# Patient Record
Sex: FEMALE | Race: WHITE | Employment: OTHER | URBAN - METROPOLITAN AREA
[De-identification: names, ages, dates, MRNs, and addresses within clinical notes are randomized per-mention and may not be internally consistent; named-entity substitution may affect disease eponyms.]

---

## 2018-02-06 ENCOUNTER — HOSPITAL ENCOUNTER (INPATIENT)
Age: 78
LOS: 3 days | Discharge: NURSING, REHAB OR LONG-TERM CARE | DRG: 066 | End: 2018-02-09
Attending: EMERGENCY MEDICINE | Admitting: INTERNAL MEDICINE
Payer: MEDICARE

## 2018-02-06 ENCOUNTER — APPOINTMENT (OUTPATIENT)
Dept: MRI IMAGING | Age: 78
DRG: 066 | End: 2018-02-06
Payer: MEDICARE

## 2018-02-06 ENCOUNTER — APPOINTMENT (OUTPATIENT)
Dept: CT IMAGING | Age: 78
DRG: 066 | End: 2018-02-06
Payer: MEDICARE

## 2018-02-06 ENCOUNTER — APPOINTMENT (OUTPATIENT)
Dept: GENERAL RADIOLOGY | Age: 78
End: 2018-02-06
Payer: MEDICARE

## 2018-02-06 ENCOUNTER — APPOINTMENT (OUTPATIENT)
Dept: CT IMAGING | Age: 78
End: 2018-02-06
Payer: MEDICARE

## 2018-02-06 ENCOUNTER — HOSPITAL ENCOUNTER (EMERGENCY)
Age: 78
Discharge: ANOTHER HOSPITAL | End: 2018-02-06
Attending: EMERGENCY MEDICINE
Payer: MEDICARE

## 2018-02-06 VITALS
HEART RATE: 85 BPM | DIASTOLIC BLOOD PRESSURE: 87 MMHG | TEMPERATURE: 100.4 F | OXYGEN SATURATION: 95 % | SYSTOLIC BLOOD PRESSURE: 186 MMHG | RESPIRATION RATE: 24 BRPM | WEIGHT: 155 LBS

## 2018-02-06 DIAGNOSIS — I16.0 HYPERTENSIVE URGENCY: ICD-10-CM

## 2018-02-06 DIAGNOSIS — R29.898 LUE WEAKNESS: Primary | ICD-10-CM

## 2018-02-06 DIAGNOSIS — I63.00 CEREBROVASCULAR ACCIDENT (CVA) DUE TO THROMBOSIS OF PRECEREBRAL ARTERY (HCC): Primary | ICD-10-CM

## 2018-02-06 PROBLEM — I10 ESSENTIAL HYPERTENSION: Status: ACTIVE | Noted: 2018-02-06

## 2018-02-06 PROBLEM — I66.01 STENOSIS OF RIGHT MIDDLE CEREBRAL ARTERY: Status: ACTIVE | Noted: 2018-02-06

## 2018-02-06 LAB
-: ABNORMAL
-: NORMAL
ABSOLUTE EOS #: 0.1 K/UL (ref 0–0.4)
ABSOLUTE IMMATURE GRANULOCYTE: ABNORMAL K/UL (ref 0–0.3)
ABSOLUTE LYMPH #: 1.3 K/UL (ref 1–4.8)
ABSOLUTE MONO #: 0.5 K/UL (ref 0–1)
ALBUMIN SERPL-MCNC: 3.9 G/DL (ref 3.5–5.2)
ALBUMIN/GLOBULIN RATIO: 1 (ref 1–2.5)
ALP BLD-CCNC: 86 U/L (ref 35–104)
ALT SERPL-CCNC: 15 U/L (ref 5–33)
AMORPHOUS: ABNORMAL
AMPHETAMINE SCREEN URINE: NEGATIVE
ANION GAP SERPL CALCULATED.3IONS-SCNC: 12 MMOL/L (ref 9–17)
AST SERPL-CCNC: 21 U/L
BACTERIA: ABNORMAL
BARBITURATE SCREEN URINE: NEGATIVE
BASOPHILS # BLD: 0 % (ref 0–2)
BASOPHILS ABSOLUTE: 0 K/UL (ref 0–0.2)
BENZODIAZEPINE SCREEN, URINE: NEGATIVE
BILIRUB SERPL-MCNC: 0.47 MG/DL (ref 0.3–1.2)
BILIRUBIN URINE: NEGATIVE
BUN BLDV-MCNC: 9 MG/DL (ref 8–23)
BUN/CREAT BLD: 11 (ref 9–20)
BUPRENORPHINE URINE: NEGATIVE
CALCIUM SERPL-MCNC: 9.4 MG/DL (ref 8.6–10.4)
CANNABINOID SCREEN URINE: NEGATIVE
CASTS UA: ABNORMAL /LPF
CHLORIDE BLD-SCNC: 99 MMOL/L (ref 98–107)
CO2: 26 MMOL/L (ref 20–31)
COCAINE METABOLITE, URINE: NEGATIVE
COLOR: YELLOW
COMMENT UA: ABNORMAL
CREAT SERPL-MCNC: 0.81 MG/DL (ref 0.5–0.9)
CRYSTALS, UA: ABNORMAL /HPF
DIFFERENTIAL TYPE: ABNORMAL
EKG ATRIAL RATE: 102 BPM
EKG P AXIS: 52 DEGREES
EKG P-R INTERVAL: 172 MS
EKG Q-T INTERVAL: 378 MS
EKG QRS DURATION: 84 MS
EKG QTC CALCULATION (BAZETT): 492 MS
EKG R AXIS: -16 DEGREES
EKG T AXIS: 29 DEGREES
EKG VENTRICULAR RATE: 102 BPM
EOSINOPHILS RELATIVE PERCENT: 1 % (ref 0–8)
EPITHELIAL CELLS UA: ABNORMAL /HPF (ref 0–25)
GFR AFRICAN AMERICAN: >60 ML/MIN
GFR NON-AFRICAN AMERICAN: >60 ML/MIN
GFR SERPL CREATININE-BSD FRML MDRD: ABNORMAL ML/MIN/{1.73_M2}
GFR SERPL CREATININE-BSD FRML MDRD: ABNORMAL ML/MIN/{1.73_M2}
GLUCOSE BLD-MCNC: 158 MG/DL
GLUCOSE BLD-MCNC: 158 MG/DL (ref 70–99)
GLUCOSE BLD-MCNC: 158 MG/DL (ref 74–100)
GLUCOSE URINE: NEGATIVE
HCT VFR BLD CALC: 43.8 % (ref 36–46)
HEMOGLOBIN: 14.5 G/DL (ref 12–16)
IMMATURE GRANULOCYTES: ABNORMAL %
INR BLD: 1 (ref 0.9–1.2)
KETONES, URINE: NEGATIVE
LEUKOCYTE ESTERASE, URINE: ABNORMAL
LYMPHOCYTES # BLD: 13 % (ref 24–44)
MCH RBC QN AUTO: 28.4 PG (ref 26–34)
MCHC RBC AUTO-ENTMCNC: 33.2 G/DL (ref 31–37)
MCV RBC AUTO: 85.4 FL (ref 80–100)
MDMA URINE: NORMAL
METHADONE SCREEN, URINE: NEGATIVE
METHAMPHETAMINE, URINE: NEGATIVE
MONOCYTES # BLD: 5 % (ref 0–12)
MUCUS: ABNORMAL
NITRITE, URINE: NEGATIVE
NRBC AUTOMATED: ABNORMAL PER 100 WBC
OPIATES, URINE: NEGATIVE
OTHER OBSERVATIONS UA: ABNORMAL
OXYCODONE SCREEN URINE: NEGATIVE
PARTIAL THROMBOPLASTIN TIME: 26 SEC (ref 23.2–34.4)
PDW BLD-RTO: 13.7 % (ref 12.1–15.2)
PH UA: 7 (ref 5–9)
PHENCYCLIDINE, URINE: NEGATIVE
PLATELET # BLD: 221 K/UL (ref 140–450)
PLATELET ESTIMATE: ABNORMAL
PMV BLD AUTO: 8.8 FL (ref 6–12)
POTASSIUM SERPL-SCNC: 3.6 MMOL/L (ref 3.7–5.3)
PROPOXYPHENE, URINE: NEGATIVE
PROTEIN UA: ABNORMAL
PROTHROMBIN TIME: 10.2 SEC (ref 9.7–12.2)
RBC # BLD: 5.13 M/UL (ref 4–5.2)
RBC # BLD: ABNORMAL 10*6/UL
RBC UA: ABNORMAL /HPF (ref 0–2)
REASON FOR REJECTION: NORMAL
RENAL EPITHELIAL, UA: ABNORMAL /HPF
SEG NEUTROPHILS: 81 % (ref 36–66)
SEGMENTED NEUTROPHILS ABSOLUTE COUNT: 8.2 K/UL (ref 1.8–7.7)
SODIUM BLD-SCNC: 137 MMOL/L (ref 135–144)
SPECIFIC GRAVITY UA: 1.01 (ref 1.01–1.02)
TEST INFORMATION: NORMAL
TOTAL PROTEIN: 7.9 G/DL (ref 6.4–8.3)
TRICHOMONAS: ABNORMAL
TRICYCLIC ANTIDEPRESSANTS, UR: NEGATIVE
TURBIDITY: CLEAR
URINE HGB: NEGATIVE
UROBILINOGEN, URINE: NORMAL
WBC # BLD: 10.2 K/UL (ref 3.5–11)
WBC # BLD: ABNORMAL 10*3/UL
WBC UA: ABNORMAL /HPF (ref 0–5)
YEAST: ABNORMAL
ZZ NTE CLEAN UP: ORDERED TEST: NORMAL
ZZ NTE WITH NAME CLEAN UP: SPECIMEN SOURCE: NORMAL

## 2018-02-06 PROCEDURE — 2500000003 HC RX 250 WO HCPCS: Performed by: EMERGENCY MEDICINE

## 2018-02-06 PROCEDURE — 71045 X-RAY EXAM CHEST 1 VIEW: CPT

## 2018-02-06 PROCEDURE — 85730 THROMBOPLASTIN TIME PARTIAL: CPT

## 2018-02-06 PROCEDURE — 99223 1ST HOSP IP/OBS HIGH 75: CPT | Performed by: PSYCHIATRY & NEUROLOGY

## 2018-02-06 PROCEDURE — 70450 CT HEAD/BRAIN W/O DYE: CPT

## 2018-02-06 PROCEDURE — 96367 TX/PROPH/DG ADDL SEQ IV INF: CPT

## 2018-02-06 PROCEDURE — 80306 DRUG TEST PRSMV INSTRMNT: CPT

## 2018-02-06 PROCEDURE — 70496 CT ANGIOGRAPHY HEAD: CPT

## 2018-02-06 PROCEDURE — 6370000000 HC RX 637 (ALT 250 FOR IP): Performed by: EMERGENCY MEDICINE

## 2018-02-06 PROCEDURE — 6370000000 HC RX 637 (ALT 250 FOR IP): Performed by: STUDENT IN AN ORGANIZED HEALTH CARE EDUCATION/TRAINING PROGRAM

## 2018-02-06 PROCEDURE — 6360000002 HC RX W HCPCS: Performed by: EMERGENCY MEDICINE

## 2018-02-06 PROCEDURE — 70498 CT ANGIOGRAPHY NECK: CPT

## 2018-02-06 PROCEDURE — 96365 THER/PROPH/DIAG IV INF INIT: CPT

## 2018-02-06 PROCEDURE — 93005 ELECTROCARDIOGRAM TRACING: CPT

## 2018-02-06 PROCEDURE — 85610 PROTHROMBIN TIME: CPT

## 2018-02-06 PROCEDURE — 2580000003 HC RX 258: Performed by: STUDENT IN AN ORGANIZED HEALTH CARE EDUCATION/TRAINING PROGRAM

## 2018-02-06 PROCEDURE — 81001 URINALYSIS AUTO W/SCOPE: CPT

## 2018-02-06 PROCEDURE — 36415 COLL VENOUS BLD VENIPUNCTURE: CPT

## 2018-02-06 PROCEDURE — 85025 COMPLETE CBC W/AUTO DIFF WBC: CPT

## 2018-02-06 PROCEDURE — 99285 EMERGENCY DEPT VISIT HI MDM: CPT

## 2018-02-06 PROCEDURE — 70551 MRI BRAIN STEM W/O DYE: CPT

## 2018-02-06 PROCEDURE — 82947 ASSAY GLUCOSE BLOOD QUANT: CPT

## 2018-02-06 PROCEDURE — 2060000000 HC ICU INTERMEDIATE R&B

## 2018-02-06 PROCEDURE — 96375 TX/PRO/DX INJ NEW DRUG ADDON: CPT

## 2018-02-06 PROCEDURE — 6360000004 HC RX CONTRAST MEDICATION: Performed by: EMERGENCY MEDICINE

## 2018-02-06 PROCEDURE — 80053 COMPREHEN METABOLIC PANEL: CPT

## 2018-02-06 RX ORDER — SODIUM CHLORIDE 0.9 % (FLUSH) 0.9 %
10 SYRINGE (ML) INJECTION EVERY 12 HOURS SCHEDULED
Status: DISCONTINUED | OUTPATIENT
Start: 2018-02-06 | End: 2018-02-09 | Stop reason: HOSPADM

## 2018-02-06 RX ORDER — ACETAMINOPHEN 325 MG/1
650 TABLET ORAL EVERY 4 HOURS PRN
Status: CANCELLED | OUTPATIENT
Start: 2018-02-06

## 2018-02-06 RX ORDER — ATORVASTATIN CALCIUM 40 MG/1
40 TABLET, FILM COATED ORAL NIGHTLY
Status: DISCONTINUED | OUTPATIENT
Start: 2018-02-06 | End: 2018-02-09 | Stop reason: HOSPADM

## 2018-02-06 RX ORDER — ONDANSETRON 2 MG/ML
4 INJECTION INTRAMUSCULAR; INTRAVENOUS EVERY 6 HOURS PRN
Status: DISCONTINUED | OUTPATIENT
Start: 2018-02-06 | End: 2018-02-09 | Stop reason: HOSPADM

## 2018-02-06 RX ORDER — ASPIRIN 81 MG/1
81 TABLET ORAL DAILY
Status: DISCONTINUED | OUTPATIENT
Start: 2018-02-07 | End: 2018-02-07

## 2018-02-06 RX ORDER — LABETALOL HYDROCHLORIDE 5 MG/ML
10 INJECTION, SOLUTION INTRAVENOUS EVERY 10 MIN PRN
Status: DISCONTINUED | OUTPATIENT
Start: 2018-02-06 | End: 2018-02-09 | Stop reason: HOSPADM

## 2018-02-06 RX ORDER — CLOPIDOGREL BISULFATE 75 MG/1
75 TABLET ORAL DAILY
Status: DISCONTINUED | OUTPATIENT
Start: 2018-02-07 | End: 2018-02-08

## 2018-02-06 RX ORDER — ACETAMINOPHEN 325 MG/1
650 TABLET ORAL EVERY 4 HOURS PRN
Status: DISCONTINUED | OUTPATIENT
Start: 2018-02-06 | End: 2018-02-09 | Stop reason: HOSPADM

## 2018-02-06 RX ORDER — SODIUM CHLORIDE 0.9 % (FLUSH) 0.9 %
10 SYRINGE (ML) INJECTION EVERY 12 HOURS SCHEDULED
Status: CANCELLED | OUTPATIENT
Start: 2018-02-06

## 2018-02-06 RX ORDER — LABETALOL HYDROCHLORIDE 5 MG/ML
20 INJECTION, SOLUTION INTRAVENOUS ONCE
Status: COMPLETED | OUTPATIENT
Start: 2018-02-06 | End: 2018-02-06

## 2018-02-06 RX ORDER — SODIUM CHLORIDE 0.9 % (FLUSH) 0.9 %
10 SYRINGE (ML) INJECTION PRN
Status: DISCONTINUED | OUTPATIENT
Start: 2018-02-06 | End: 2018-02-09 | Stop reason: HOSPADM

## 2018-02-06 RX ORDER — LABETALOL HYDROCHLORIDE 5 MG/ML
10 INJECTION, SOLUTION INTRAVENOUS
Status: DISPENSED | OUTPATIENT
Start: 2018-02-06 | End: 2018-02-06

## 2018-02-06 RX ORDER — SODIUM CHLORIDE 0.9 % (FLUSH) 0.9 %
10 SYRINGE (ML) INJECTION PRN
Status: CANCELLED | OUTPATIENT
Start: 2018-02-06

## 2018-02-06 RX ORDER — LEVETIRACETAM 5 MG/ML
500 INJECTION INTRAVASCULAR ONCE
Status: COMPLETED | OUTPATIENT
Start: 2018-02-06 | End: 2018-02-06

## 2018-02-06 RX ORDER — CLOPIDOGREL BISULFATE 75 MG/1
300 TABLET ORAL ONCE
Status: COMPLETED | OUTPATIENT
Start: 2018-02-06 | End: 2018-02-06

## 2018-02-06 RX ORDER — ASPIRIN 81 MG/1
324 TABLET, CHEWABLE ORAL ONCE
Status: COMPLETED | OUTPATIENT
Start: 2018-02-06 | End: 2018-02-06

## 2018-02-06 RX ORDER — ACETAMINOPHEN 325 MG/1
650 TABLET ORAL ONCE
Status: COMPLETED | OUTPATIENT
Start: 2018-02-06 | End: 2018-02-06

## 2018-02-06 RX ADMIN — CLOPIDOGREL BISULFATE 300 MG: 75 TABLET ORAL at 14:49

## 2018-02-06 RX ADMIN — ACETAMINOPHEN 650 MG: 325 TABLET ORAL at 19:38

## 2018-02-06 RX ADMIN — IOPAMIDOL 90 ML: 755 INJECTION, SOLUTION INTRAVENOUS at 17:49

## 2018-02-06 RX ADMIN — NICARDIPINE HYDROCHLORIDE 5 MG/HR: 0.2 INJECTION, SOLUTION INTRAVENOUS at 13:32

## 2018-02-06 RX ADMIN — Medication 10 ML: at 23:30

## 2018-02-06 RX ADMIN — LEVETIRACETAM 500 MG: 5 INJECTION INTRAVENOUS at 12:53

## 2018-02-06 RX ADMIN — DESMOPRESSIN ACETATE 40 MG: 0.2 TABLET ORAL at 23:30

## 2018-02-06 RX ADMIN — LABETALOL HYDROCHLORIDE 20 MG: 5 INJECTION, SOLUTION INTRAVENOUS at 12:31

## 2018-02-06 RX ADMIN — ASPIRIN 81 MG 324 MG: 81 TABLET ORAL at 14:48

## 2018-02-06 ASSESSMENT — ENCOUNTER SYMPTOMS
ABDOMINAL PAIN: 0
ABDOMINAL DISTENTION: 0
NAUSEA: 0
CONSTIPATION: 0
COLOR CHANGE: 0
SHORTNESS OF BREATH: 0
COUGH: 0
WHEEZING: 0
DIARRHEA: 0

## 2018-02-06 ASSESSMENT — PAIN SCALES - GENERAL: PAINLEVEL_OUTOF10: 6

## 2018-02-06 NOTE — ED NOTES
Pt placed on cardiac monitor, bp cuff and pulse ox. Dr. Tonia Hicks at bedside to evaluate.      Arthur Hodgesident, RN  02/06/18 [id number]

## 2018-02-06 NOTE — ED PROVIDER NOTES
File       REVIEW OF SYSTEMS    (2-9 systems for level 4, 10 or more for level 5)      Review of Systems   Constitutional: Negative for chills and fever. HENT: Negative for congestion, rhinorrhea, sinus pressure and sore throat. Eyes: Negative for photophobia and visual disturbance. Respiratory: Negative for cough and shortness of breath. Cardiovascular: Negative for chest pain. Gastrointestinal: Negative for abdominal pain, blood in stool, constipation, diarrhea, nausea and vomiting. Genitourinary: Negative for dysuria and hematuria. Musculoskeletal: Negative for back pain and neck pain. Skin: Negative for rash. Neurological: Positive for weakness. Negative for dizziness, light-headedness, numbness and headaches. Psychiatric/Behavioral: Negative for suicidal ideas. PHYSICAL EXAM   (up to 7 for level 4, 8 or more for level 5)      INITIAL VITALS:   BP (!) 176/67   Pulse 73   Temp 97.8 °F (36.6 °C) (Oral)   Resp 16   Ht 5' 5\" (1.651 m)   Wt 155 lb 3.3 oz (70.4 kg)   SpO2 95%   BMI 25.83 kg/m²     Physical Exam   Constitutional: She is oriented to person, place, and time. She appears well-developed and well-nourished. HENT:   Head: Normocephalic and atraumatic. Right Ear: Tympanic membrane normal.   Left Ear: Tympanic membrane normal.   Eyes: EOM are normal. Pupils are equal, round, and reactive to light. No scleral icterus. Neck: Normal range of motion. Neck supple. No JVD present. Cardiovascular: Normal rate, regular rhythm, normal heart sounds and intact distal pulses. Exam reveals no gallop and no friction rub. No murmur heard. Pulmonary/Chest: Effort normal and breath sounds normal. No respiratory distress. She has no wheezes. She has no rales. Abdominal: Soft. Bowel sounds are normal. She exhibits no distension and no mass. There is no tenderness. There is no rebound and no guarding. Musculoskeletal: Normal range of motion. She exhibits no edema. reconstruction, and/or weight based adjustment of the mA/kV was utilized to reduce the radiation dose to as low as reasonably achievable.; CTA of the neck was performed with the administration of intravenous contrast. Multiplanar reformatted images are provided for review. MIP images are provided for review. Stenosis of the internal carotid arteries measured using NASCET criteria. Dose modulation, iterative reconstruction, and/or weight based adjustment of the mA/kV was utilized to reduce the radiation dose to as low as reasonably achievable. COMPARISON: None. CT brain performed at [address] [place] [address] HISTORY: LUE weakness Initial presentation. FINDINGS: CTA NECK: AORTIC ARCH/ARCH VESSELS: Mild atherosclerotic calcifications are noted at the origin of the innominate artery. There is no significant stenosis of the origins of the great vessels. There is no significant stenosis of either the right or left subclavian artery. CAROTID ARTERIES: There is no significant stenosis of either the right or left common carotid artery. Mild calcified atherosclerotic plaque is present at the origins of both internal carotid arteries. There is however no significant stenosis of either the right or left internal carotid artery based on NASCET criteria. VERTEBRAL ARTERIES: The vertebral arteries are normal in appearance. There is no significant stenosis or dissection. SOFT TISSUES: The lung apices are clear. There is no pathologically enlarged lymphadenopathy identified. Complete opacification of the left maxillary sinus is present with a thickened and sclerotic appearance of the walls of the left maxillary sinus suggesting chronic left maxillary sinusitis. BONES: No lytic or blastic osseous lesions are identified. CTA HEAD: ANTERIOR CIRCULATION: Calcified atherosclerotic plaque is present within the cavernous segments of the internal carotid arteries.   There is no significant stenosis or the radiation dose to as low as reasonably achievable. COMPARISON: None. CT brain performed at [address] [place] [address] HISTORY: LUE weakness Initial presentation. FINDINGS: CTA NECK: AORTIC ARCH/ARCH VESSELS: Mild atherosclerotic calcifications are noted at the origin of the innominate artery. There is no significant stenosis of the origins of the great vessels. There is no significant stenosis of either the right or left subclavian artery. CAROTID ARTERIES: There is no significant stenosis of either the right or left common carotid artery. Mild calcified atherosclerotic plaque is present at the origins of both internal carotid arteries. There is however no significant stenosis of either the right or left internal carotid artery based on NASCET criteria. VERTEBRAL ARTERIES: The vertebral arteries are normal in appearance. There is no significant stenosis or dissection. SOFT TISSUES: The lung apices are clear. There is no pathologically enlarged lymphadenopathy identified. Complete opacification of the left maxillary sinus is present with a thickened and sclerotic appearance of the walls of the left maxillary sinus suggesting chronic left maxillary sinusitis. BONES: No lytic or blastic osseous lesions are identified. CTA HEAD: ANTERIOR CIRCULATION: Calcified atherosclerotic plaque is present within the cavernous segments of the internal carotid arteries. There is no significant stenosis or aneurysm identified within the intracranial segments of the internal carotid arteries. There is however no significant stenosis of the intracranial segments of the internal carotid arteries. There is a moderate to severe short segment stenosis of the mid portion of the M1 segment of the right middle cerebral artery. The middle cerebral arteries are otherwise normal in appearance without significant stenosis or cerebral aneurysm. The anterior cerebral arteries are normal in appearance.

## 2018-02-06 NOTE — ED TRIAGE NOTES
Pt transferred from [place] for possible stroke. Pt arrives with L sided weakness since yesterday. Pt arrives alert and oriented.

## 2018-02-06 NOTE — ED PROVIDER NOTES
[facility]  Emergency Department  Faculty Attestation     I performed a history and physical examination of the patient and discussed management with the resident. I reviewed the residents note and agree with the documented findings and plan of care. Any areas of disagreement are noted on the chart. I was personally present for the key portions of any procedures. I have documented in the chart those procedures where I was not present during the key portions. I have reviewed the emergency nurses triage note. I agree with the chief complaint, past medical history, past surgical history, allergies, medications, social and family history as documented unless otherwise noted below. For Physician Assistant/ Nurse Practitioner cases/documentation I have personally evaluated this patient and have completed at least one if not all key elements of the E/M (history, physical exam, and MDM). Additional findings are as noted. Primary Care Physician:  Teetee Parham MD    Screenings:  [unfilled]    CHIEF COMPLAINT       Chief Complaint   Patient presents with    Extremity Weakness       RECENT VITALS:    ,   ,  ,      LABS:  Labs Reviewed - No data to display    Radiology  No orders to display       CRITICAL CARE: There was a high probability of clinically significant/life threatening deterioration in this patient's condition which required my urgent intervention. Total critical care time was 5 minutes. This excludes any time for separately reportable procedures. Attending Physician Additional  Notes    Left side weakness and facial droop since late last night, transferred from outside hospital. CT negative for blood. Given labetolol, cardene gtt, keppra, aspirin, plavix. Headache resolved. On exam, GCS 15, no distress. Normal orientation, mentation, speech, pupils, EOM. No visible facial droop. Dense LUE paresis. LLE intact.  Plan stroke alert, monitor BP, CTA, MRI, admission. Zachery Kilgore.  Nicola Omer MD, [facility]  Attending Emergency  Physician                Melany Colón MD  02/06/18 0868

## 2018-02-06 NOTE — ED NOTES
Pt report from Teresa Stapleton pt resting on cot respirations even and unlabored, weakness noted to left arm will continue to monitor     Juan Lawton RN  02/06/18 2011

## 2018-02-07 LAB
CHOLESTEROL/HDL RATIO: 4.1
CHOLESTEROL: 219 MG/DL
ESTIMATED AVERAGE GLUCOSE: 123 MG/DL
HBA1C MFR BLD: 5.9 % (ref 4–6)
HDLC SERPL-MCNC: 54 MG/DL
LDL CHOLESTEROL: 138 MG/DL (ref 0–130)
LV EF: 65 %
LVEF MODALITY: NORMAL
TRIGL SERPL-MCNC: 133 MG/DL
VLDLC SERPL CALC-MCNC: ABNORMAL MG/DL (ref 1–30)

## 2018-02-07 PROCEDURE — 6360000002 HC RX W HCPCS: Performed by: STUDENT IN AN ORGANIZED HEALTH CARE EDUCATION/TRAINING PROGRAM

## 2018-02-07 PROCEDURE — 6370000000 HC RX 637 (ALT 250 FOR IP): Performed by: STUDENT IN AN ORGANIZED HEALTH CARE EDUCATION/TRAINING PROGRAM

## 2018-02-07 PROCEDURE — 83036 HEMOGLOBIN GLYCOSYLATED A1C: CPT

## 2018-02-07 PROCEDURE — 99222 1ST HOSP IP/OBS MODERATE 55: CPT | Performed by: PSYCHIATRY & NEUROLOGY

## 2018-02-07 PROCEDURE — 36415 COLL VENOUS BLD VENIPUNCTURE: CPT

## 2018-02-07 PROCEDURE — 93306 TTE W/DOPPLER COMPLETE: CPT

## 2018-02-07 PROCEDURE — 80061 LIPID PANEL: CPT

## 2018-02-07 PROCEDURE — 2580000003 HC RX 258: Performed by: STUDENT IN AN ORGANIZED HEALTH CARE EDUCATION/TRAINING PROGRAM

## 2018-02-07 PROCEDURE — 94762 N-INVAS EAR/PLS OXIMTRY CONT: CPT

## 2018-02-07 PROCEDURE — 99223 1ST HOSP IP/OBS HIGH 75: CPT | Performed by: INTERNAL MEDICINE

## 2018-02-07 PROCEDURE — 2060000000 HC ICU INTERMEDIATE R&B

## 2018-02-07 RX ADMIN — DESMOPRESSIN ACETATE 40 MG: 0.2 TABLET ORAL at 20:12

## 2018-02-07 RX ADMIN — ACETAMINOPHEN 650 MG: 325 TABLET ORAL at 13:20

## 2018-02-07 RX ADMIN — Medication 10 ML: at 09:06

## 2018-02-07 RX ADMIN — ASPIRIN 81 MG: 81 TABLET, COATED ORAL at 09:05

## 2018-02-07 RX ADMIN — ENOXAPARIN SODIUM 40 MG: 40 INJECTION SUBCUTANEOUS at 09:08

## 2018-02-07 RX ADMIN — CLOPIDOGREL 75 MG: 75 TABLET, FILM COATED ORAL at 09:05

## 2018-02-07 RX ADMIN — ACETAMINOPHEN 650 MG: 325 TABLET ORAL at 22:05

## 2018-02-07 ASSESSMENT — ENCOUNTER SYMPTOMS
BLOOD IN STOOL: 0
BACK PAIN: 0
SINUS PRESSURE: 0
VOMITING: 0
DIARRHEA: 0
SHORTNESS OF BREATH: 0
ABDOMINAL PAIN: 0
RHINORRHEA: 0
PHOTOPHOBIA: 0
NAUSEA: 0
COUGH: 0
CONSTIPATION: 0
SORE THROAT: 0

## 2018-02-07 ASSESSMENT — PAIN SCALES - GENERAL
PAINLEVEL_OUTOF10: 1
PAINLEVEL_OUTOF10: 7
PAINLEVEL_OUTOF10: 0

## 2018-02-07 NOTE — CONSULTS
Recommendations:  1. Intracranial stenosis - aspirin 325mg, clopidogrel 75mg daily. Load 300mg daily. lipitor 40mg, ldl<70  2. Pt/ot, s/s  3. 2d echo  4. followup with neuroendovascular clinic  5. No intervention at this time as not medical optimized    Discussed with Ed and neuro team along with bedside nurse.   80 min and greater than 50% time spent evaluating patient, personally reviewing images and speaking with team    Naima Beltran MD Pager: [phone]  Stroke, [facility]  [address] [place]  Electronically signed 2/6/2018 at 11:29 PM

## 2018-02-07 NOTE — CONSULTS
Moderate to severe stenosis of midportion of M1 segment of right MCA. Otherwise no significant stenosis, major branch occlusion or cerebral aneurysm identified. No significant arterial stenosis identified within the neck. MRI brain 9/7/03: Multiple embolic infarcts within posterior one half of the right MCA territory involving cortex and subcortical and deep white matter of right frontal, temporal and parietal lobes. No acute intracranial hemorrhage. CT head 2/6/18: No acute intracranial process. Mild chronic white matter changes and atrophy. EKG (2/6/18): sinus tachycardia. Lipid Panel (2/7/18): , HDL 54, , Cholesterol 219              Impression and Plan: Ms. Jia Cabrera is a 69 y.o. 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 Echocardiogram today and proceed accordingly. Passed bedside swallow and presently on regular diet. Discussed with patient and her  at bedside and Nurse. Will follow with you. Thank you for consultation.

## 2018-02-08 LAB
ANION GAP SERPL CALCULATED.3IONS-SCNC: 16 MMOL/L (ref 9–17)
BUN BLDV-MCNC: 12 MG/DL (ref 8–23)
BUN/CREAT BLD: ABNORMAL (ref 9–20)
CALCIUM SERPL-MCNC: 9.1 MG/DL (ref 8.6–10.4)
CHLORIDE BLD-SCNC: 102 MMOL/L (ref 98–107)
CO2: 21 MMOL/L (ref 20–31)
CREAT SERPL-MCNC: 0.89 MG/DL (ref 0.5–0.9)
GFR AFRICAN AMERICAN: >60 ML/MIN
GFR NON-AFRICAN AMERICAN: >60 ML/MIN
GFR SERPL CREATININE-BSD FRML MDRD: ABNORMAL ML/MIN/{1.73_M2}
GFR SERPL CREATININE-BSD FRML MDRD: ABNORMAL ML/MIN/{1.73_M2}
GLUCOSE BLD-MCNC: 171 MG/DL (ref 70–99)
HCT VFR BLD CALC: 46.7 % (ref 36.3–47.1)
HEMOGLOBIN: 14.2 G/DL (ref 11.9–15.1)
MCH RBC QN AUTO: 27.9 PG (ref 25.2–33.5)
MCHC RBC AUTO-ENTMCNC: 30.4 G/DL (ref 28.4–34.8)
MCV RBC AUTO: 91.7 FL (ref 82.6–102.9)
NRBC AUTOMATED: 0 PER 100 WBC
PDW BLD-RTO: 12.8 % (ref 11.8–14.4)
PLATELET # BLD: 218 K/UL (ref 138–453)
PMV BLD AUTO: 10.4 FL (ref 8.1–13.5)
POTASSIUM SERPL-SCNC: 3.8 MMOL/L (ref 3.7–5.3)
RBC # BLD: 5.09 M/UL (ref 3.95–5.11)
SODIUM BLD-SCNC: 139 MMOL/L (ref 135–144)
WBC # BLD: 8.7 K/UL (ref 3.5–11.3)

## 2018-02-08 PROCEDURE — G8978 MOBILITY CURRENT STATUS: HCPCS

## 2018-02-08 PROCEDURE — 80048 BASIC METABOLIC PNL TOTAL CA: CPT

## 2018-02-08 PROCEDURE — 97116 GAIT TRAINING THERAPY: CPT

## 2018-02-08 PROCEDURE — 6370000000 HC RX 637 (ALT 250 FOR IP): Performed by: STUDENT IN AN ORGANIZED HEALTH CARE EDUCATION/TRAINING PROGRAM

## 2018-02-08 PROCEDURE — 6370000000 HC RX 637 (ALT 250 FOR IP): Performed by: NURSE PRACTITIONER

## 2018-02-08 PROCEDURE — 99221 1ST HOSP IP/OBS SF/LOW 40: CPT | Performed by: PHYSICAL MEDICINE & REHABILITATION

## 2018-02-08 PROCEDURE — 99233 SBSQ HOSP IP/OBS HIGH 50: CPT | Performed by: INTERNAL MEDICINE

## 2018-02-08 PROCEDURE — 97535 SELF CARE MNGMENT TRAINING: CPT

## 2018-02-08 PROCEDURE — 94762 N-INVAS EAR/PLS OXIMTRY CONT: CPT

## 2018-02-08 PROCEDURE — G8987 SELF CARE CURRENT STATUS: HCPCS

## 2018-02-08 PROCEDURE — 85027 COMPLETE CBC AUTOMATED: CPT

## 2018-02-08 PROCEDURE — 2060000000 HC ICU INTERMEDIATE R&B

## 2018-02-08 PROCEDURE — 97162 PT EVAL MOD COMPLEX 30 MIN: CPT

## 2018-02-08 PROCEDURE — 2580000003 HC RX 258: Performed by: STUDENT IN AN ORGANIZED HEALTH CARE EDUCATION/TRAINING PROGRAM

## 2018-02-08 PROCEDURE — 6360000002 HC RX W HCPCS: Performed by: STUDENT IN AN ORGANIZED HEALTH CARE EDUCATION/TRAINING PROGRAM

## 2018-02-08 PROCEDURE — 99232 SBSQ HOSP IP/OBS MODERATE 35: CPT | Performed by: PSYCHIATRY & NEUROLOGY

## 2018-02-08 PROCEDURE — 36415 COLL VENOUS BLD VENIPUNCTURE: CPT

## 2018-02-08 PROCEDURE — G8979 MOBILITY GOAL STATUS: HCPCS

## 2018-02-08 PROCEDURE — 97167 OT EVAL HIGH COMPLEX 60 MIN: CPT

## 2018-02-08 PROCEDURE — 99233 SBSQ HOSP IP/OBS HIGH 50: CPT | Performed by: PSYCHIATRY & NEUROLOGY

## 2018-02-08 PROCEDURE — G8988 SELF CARE GOAL STATUS: HCPCS

## 2018-02-08 RX ORDER — ASPIRIN 81 MG/1
81 TABLET ORAL DAILY
Qty: 30 TABLET | Refills: 3 | Status: SHIPPED | OUTPATIENT
Start: 2018-02-09 | End: 2020-11-19 | Stop reason: ALTCHOICE

## 2018-02-08 RX ORDER — CLOPIDOGREL BISULFATE 75 MG/1
75 TABLET ORAL DAILY
Status: DISCONTINUED | OUTPATIENT
Start: 2018-02-08 | End: 2018-02-09 | Stop reason: HOSPADM

## 2018-02-08 RX ORDER — ASPIRIN 81 MG/1
81 TABLET ORAL DAILY
Status: DISCONTINUED | OUTPATIENT
Start: 2018-02-09 | End: 2018-02-09 | Stop reason: HOSPADM

## 2018-02-08 RX ORDER — CLOPIDOGREL BISULFATE 75 MG/1
75 TABLET ORAL DAILY
Qty: 20 TABLET | Refills: 0 | Status: SHIPPED | OUTPATIENT
Start: 2018-02-09 | End: 2018-03-06

## 2018-02-08 RX ORDER — LISINOPRIL 5 MG/1
5 TABLET ORAL DAILY
Status: DISCONTINUED | OUTPATIENT
Start: 2018-02-08 | End: 2018-02-09

## 2018-02-08 RX ORDER — LISINOPRIL 5 MG/1
5 TABLET ORAL DAILY
Qty: 30 TABLET | Refills: 3 | Status: SHIPPED | OUTPATIENT
Start: 2018-02-09 | End: 2018-02-09

## 2018-02-08 RX ORDER — ATORVASTATIN CALCIUM 40 MG/1
40 TABLET, FILM COATED ORAL NIGHTLY
Qty: 30 TABLET | Refills: 3 | Status: SHIPPED | OUTPATIENT
Start: 2018-02-08 | End: 2018-03-19 | Stop reason: SDUPTHER

## 2018-02-08 RX ADMIN — ACETAMINOPHEN 650 MG: 325 TABLET ORAL at 16:01

## 2018-02-08 RX ADMIN — ENOXAPARIN SODIUM 40 MG: 40 INJECTION SUBCUTANEOUS at 08:31

## 2018-02-08 RX ADMIN — LISINOPRIL 5 MG: 5 TABLET ORAL at 11:32

## 2018-02-08 RX ADMIN — Medication 10 ML: at 21:28

## 2018-02-08 RX ADMIN — Medication 10 ML: at 11:33

## 2018-02-08 RX ADMIN — CLOPIDOGREL 75 MG: 75 TABLET, FILM COATED ORAL at 08:32

## 2018-02-08 RX ADMIN — ASPIRIN 325 MG: 325 TABLET, COATED ORAL at 08:32

## 2018-02-08 RX ADMIN — DESMOPRESSIN ACETATE 40 MG: 0.2 TABLET ORAL at 21:27

## 2018-02-08 RX ADMIN — ACETAMINOPHEN 650 MG: 325 TABLET ORAL at 08:32

## 2018-02-08 ASSESSMENT — PAIN SCALES - GENERAL
PAINLEVEL_OUTOF10: 0
PAINLEVEL_OUTOF10: 0
PAINLEVEL_OUTOF10: 4
PAINLEVEL_OUTOF10: 0
PAINLEVEL_OUTOF10: 1
PAINLEVEL_OUTOF10: 4
PAINLEVEL_OUTOF10: 0
PAINLEVEL_OUTOF10: 0

## 2018-02-08 NOTE — PROGRESS NOTES
Little = Minimum/Contact Guard Assist/Supervision  4. None= Modified [place]/Independent    Raw Score Scale Score Scale Score Standard Error Approximate Degree of Functional Impairment     6 17.07 3.74 100%   7 20.13 3.68 92%   8 22.86 3.43 86%   9 25.33 3.17 80%   10 27.31 2.96 75%   11 29.04 2.79 70%   12 30.60 2.68 67%   13 32.03 2.62 63%   14 33.39 2.61 60%   15 34.69 2.65 56%   16 35.96 2.71 53%   17 37.26 2.82 50%   18 38.66 2.97 47%   19 40.22 3.20 43%   20 42.03 3.55 38%   21 44.27 4.08 33%   22 47.10 4.81 26%   23 51.12 5.88 16%   24 57.54 7.36 0%     Goals  Short term goals  Time Frame for Short term goals: Pt will, by discharge. Short term goal 1: perform G dynamic sitting balance during functional activities to improve balance and safety with ADL routines  Short term goal 2: complete UB ADLs utilizing compensatory strategies and AE PRN with SBA  Short term goal 3: complete LB ADLs utilizing compensatory strategies and AE PRN with CGA   Short term goal 4: complete functional transfers/mobility with hemiwalker and CGA to improve independence with functional transfers/mobility for participation in ADL routines  Short term goal 5: improve LUE strength to 2+/5 to increase LUE participation in ADL routines   Short term goal 6: Family will verbalize G understanding of techniques to assist pt utilize LUE during functional activities  Patient Goals   Patient goals : Pt wants to return home. Therapy Time   Individual Concurrent Group Co-treatment   Time In 0810         Time Out 4228         Minutes 75              Discharge recommendations discussed with patient during initial evaluation.     Fox Colunga, S/OT

## 2018-02-08 NOTE — PROGRESS NOTES
10 mg, 10 mg, Intravenous, Q10 Min PRN               TTE 2/7/2018:  Summary  Normal LV size. Mild to moderately increased LV wall thickness. No obvious wall motion abnormality seen. Normal LV systolic function with LVEF 65%. Grade I LV diastolic dysfunction. Normal RV size and function. Normal size LA and RA. Aortic valve is sclerotic, but opens well. Mitral annular calcification without stenosis. Normal aortic root dimension. No significant pericardial effusion. No obvious intra-cardiac mass or shunt noted. ASSESSMENT AND PLAN    68 yo woman presenting with R MCA territory infarcts in the setting of moderate-severe R MCA M1 stenosis; there is also likely left M1 MCA stenosis, not reported.   Would follow SAMMPRIS protocol for aggressive medical management, but would also not exclude cardioembolic work up.    -->continue dual antiplatelets (SAMMPRIS continued for 90 days)  -->aggresive longterm BP control  -->neurointervention fellows clinic ~2 weeks

## 2018-02-08 NOTE — CONSULTS
Inpatient consult to PM&R - Physiatry  Consult performed by: Kimo Flores  Consult ordered by: Valentin Pedro      Please refer to initial consult from today to 8/20/18.   Dr. Juan Manuel Mitchell and rehabilitation

## 2018-02-08 NOTE — PLAN OF CARE
Problem: Falls - Risk of:  Goal: Absence of physical injury  Absence of physical injury   Outcome: Ongoing  Patient assessed for fall risk and fall precautions initiated as needed. Patient and family instructed about safety devices and allowed to make decisions related to safey. Environment kept free of clutter and adequate lighting provided. Bed in lowest position with brakes locked. Call light in reach. Patient ID band correct and in place. Patient transferred with appropriate methods. Will continue to monitor.        Problem: PAIN  Goal: Patient's pain/discomfort is manageable  Outcome: Ongoing

## 2018-02-08 NOTE — PROGRESS NOTES
Physical Therapy    Facility/Department: [facility] NEURO  Initial Assessment    NAME: Dennis Sender  : 1940  MRN: [id number]    Date of Service: 2018  Chief Complaint   Patient presents with    Extremity Weakness       Patient Diagnosis(es): The encounter diagnosis was LUE weakness. has no past medical history on file. has no past surgical history on file. Restrictions  Restrictions/Precautions  Restrictions/Precautions: Fall Risk  Required Braces or Orthoses?: No  Position Activity Restriction  Other position/activity restrictions: LEXI [email or web address] for PT evaluation and treatment  Vision/Hearing  Vision: Within Functional Limits  Hearing: Within functional limits     Subjective  General  Chart Reviewed: Yes  Patient assessed for rehabilitation services?: Yes  Response To Previous Treatment: Not applicable  Family / Caregiver Present: Yes ()  Follows Commands: Within Functional Limits  General Comment  Comments: Pt left sitting in chair with call light in reach of right hand. Subjective  Subjective: Pt sitting in chair visiting with  upon arrival; Pt agreeable to PT evaluation.    Pain Screening  Patient Currently in Pain: Denies  Vital Signs  Patient Currently in Pain: Denies       Orientation  Orientation  Overall Orientation Status: Within Functional Limits    Social/Functional History  Social/Functional History  Lives With: Spouse  Type of Home: House  Home Layout: Two level, Able to Live on Main level with bedroom/bathroom  Home Access: Stairs to enter without rails  Entrance Stairs - Number of Steps: 4 UYEN (4 UYEN backdoor (more frequently used), 3 UYEN frontdoor. )  Bathroom Shower/Tub: Tub/Shower unit  Bathroom Toilet: Handicap height  Bathroom Equipment:  (bars on both sides of sliding shower door)  Bathroom Accessibility: Accessible  Home Equipment:  (no DME)  Receives Help From: Family  ADL Assistance: Independent  Homemaking Assistance: Independent  Homemaking

## 2018-02-08 NOTE — CONSULTS
weight shifting, hemiwalker placement, and balance. Lowered assitance to min (A) as patient improved ability to weight shifting and navigate hemiwalker)  Quality of Gait: 3 point gait, short step length  Distance: 10 ft x 1 with WC follow, 5ft x 1 in room to chair. Comments: VC's needed for correct hand placement on hemiwalker, hemiwalker management and navigation. OT:   Balance  Sitting Balance: Minimal assistance (CGA overall, min A x2 with dynamic sitting balance d/t LOB to L.)  Standing Balance: Moderate assistance (Decreased assistance to min A as trials progressed.)     Standing Balance  Time: ~5 minutes x2  Activity: functional transfers/mobility, ADL completion      Sit to stand: Moderate assistance (Decreased to min A from bedside chair.)  Stand to sit: Moderate assistance (Decreased to min A from bedside chair.)     Functional Mobility  Functional - Mobility Device: Other  Activity: Other  Assist Level: Moderate assistance  Functional Mobility Comments: VC for weight shifting and transfer techniques.     ADL  Feeding: Setup (use of RUE. )  Grooming: Setup (Completed while seated in bedside chair with use of RUE. )  UE Bathing: Minimal assistance; Increased time to complete (Completed while seated in bedside chair.)  LE Bathing: Increased time to complete;Minimal assistance (Completed while seated in bedside chair.)  UE Dressing: Increased time to complete; Moderate assistance  LE Dressing: Moderate assistance; Increased time to complete  Toileting: Minimal assistance (Completed standing to complete perineal hygiene. )      Tone RUE  RUE Tone: Normotonic  Tone LUE  LUE Tone: Hypotonic  Coordination  Movements Are Fluid And Coordinated: No  Coordination and Movement description: Fine motor impairments;Gross motor impairments;Decreased speed;Decreased accuracy; Left UE  Quality of Movement Other  Comment: RUE WFL      Bed mobility  Supine to Sit: Minimal assistance  Sit to Supine: Minimal lobes.   2. No acute intracranial hemorrhage. 3. Mild chronic small vessel ischemic changes. The findings were sent to the Radiology Results [address] at 10:35   pm on 2/6/2018to be communicated to a licensed caregiver. CTA head and neck       AORTIC ARCH/ARCH VESSELS: Mild atherosclerotic calcifications are noted at  the origin of the innominate artery.  There is no significant stenosis of the  origins of the great vessels.  There is no significant stenosis of either the  right or left subclavian artery. CAROTID ARTERIES: There is no significant stenosis of either the right or  left common carotid artery.  Mild calcified atherosclerotic plaque is present  at the origins of both internal carotid arteries. Robert Dura is however no  significant stenosis of either the right or left internal carotid artery  based on NASCET criteria. VERTEBRAL ARTERIES: The vertebral arteries are normal in appearance.  There  is no significant stenosis or dissection. SOFT TISSUES: The lung apices are clear.  There is no pathologically enlarged  lymphadenopathy identified.  Complete opacification of the left maxillary  sinus is present with a thickened and sclerotic appearance of the walls of  the left maxillary sinus suggesting chronic left maxillary sinusitis. BONES: No lytic or blastic osseous lesions are identified. Diagnostics:    CBC:   Recent Labs      02/06/18   1220  02/08/18   0957   WBC  10.2  8.7   RBC  5.13  5.09   HGB  14.5  14.2   HCT  43.8  46.7   MCV  85.4  91.7   RDW  13.7  12.8   PLT  221  218     BMP:   Recent Labs      02/06/18   1220  02/08/18   0957   NA  137  139   K  3.6*  3.8   CL  99  102   CO2  26  21   BUN  9  12   CREATININE  0.81  0.89     BNP: No results for input(s): BNP in the last 72 hours.   PT/INR:   Recent Labs      02/06/18   1415   PROTIME  10.2   INR  1.0     APTT:   Recent Labs      02/06/18   1415   APTT  26.0     CARDIAC ENZYMES: No results for input(s): CKMB,

## 2018-02-08 NOTE — PROGRESS NOTES
NEUROLOGY INPATIENT PROGRESS NOTE    2/8/2018         Subjective: Piper Mckeon is a  69 y.o. female admitted on 2/6/2018 with LUE weakness [R29.898]      Briefly, this is a  69 y.o. female admitted on 2/6/2018 with no significant PMH was admitted as a transfer from [facility] ED on 2/6/2018 with new onset left upper extremity weakness associated with numbness since evening of 2/5/18. Patient stated that she was in her usual status of health until Monday evening and then she started having weakness in left upper extremity. Initially it was \"mild\" and she did not seek medical attention. As he continued to get worse; she was taken to [facility] ED next morning. She had CT head and it was negative. Then she was transferred to [place] for further evaluation and management. She was evaluated by stroke team.  CTA head and neck demonstrated right M1 segment severe stenosis versus recanalization. Endovascular neurology felt that no acute intervention required at this point of time as she was never on any medical management. She she was loaded with 300 mg Plavix, started on Lipitor 40 mg, plus maintenance dual antiplatelet therapy. MRI brain showed findings consistent with multiple embolic infarcts within the posterior one-half of the right MCA territory involving the cortex and subcortical and deep white matter of the right frontal, temporal and parietal lobes; mild SVID changes. Patient stated that she was never on aspirin or Plavix or any other antiplatelets. She was also not on any lipid-lowering agents. She admits to having \"mild headache\" with aching pain located in fore head associated with photophobia and phonophobia. Denied nausea and vomiting. Denied dizziness with headache. Headache \"went away with Tylenol\". She does not want to be on any additional medications at this point of time.   Admits to left upper extremity weakness associated with numbness but denies left facial or Significant for hypotonia and weakness of grade 0/5 in left upper extremity; otherwise 5/5 in left LE and Rt UE and Rt LE with normal bulk, normal tone, no involuntary movements, no tremor   SENSORY FUNCTION:  Normal touch, normal pin,  normal proprioception, graphesthesia    CEREBELLAR FUNCTION:  No right upper or either lower extremity cerebellar sign, no truncal ataxia    REFLEX FUNCTION:  Symmetric, no perverted reflex, left extensor plantar response and right equivocal plantar response   STATION and GAIT Symmetric  WB, significant hesitation lifting left leg with the effect that the patient takes very small steps with either leg and with nonspecific mild to moderate instability             Data:  Type of Study      TTE procedure:2D Echocardiogram, M-Mode, Doppler, Color Doppler.     Procedure Date  Date: 02/07/2018 Start: 11:00 AM    Study Location: [facility]  Technical Quality: Good visualization    History / Tech. Comments:  Procedure explained to patient. CVA, HTN    Patient Status: Inpatient    Height: 65 inches Weight: 155 pounds BSA: 1.78 m^2 BMI: 25.79 kg/m^2    CONCLUSIONS    Summary  Normal LV size. Mild to moderately increased LV wall thickness. No obvious wall motion abnormality seen. Normal LV systolic function with LVEF 65%. Grade I LV diastolic dysfunction. Normal RV size and function. Normal size LA and RA. Aortic valve is sclerotic, but opens well. Mitral annular calcification without stenosis. Normal aortic root dimension. No significant pericardial effusion. No obvious intra-cardiac mass or shunt noted.     Signature  ----------------------------------------------------------------------------   Electronically signed by Tiffanie Sims(Interpreting physician) on   02/08/2018 09:49 AM  Ct Head Wo Contrast    Result Date: 2/6/2018  REPORT: CT head without contrast TECHNIQUE: Contiguous thin axial slices through the head obtained without IV contrast. INDICATION: Left-sided 0.677814 mmHg for the next several weeks. Attending has started enalapril 2.5 mg daily. Passed bedside swallow and presently on regular consistency diet. Recommend dual antiplatelet therapy for 3 weeks then back to aspirin 81 mg daily, stroke clinic follow up in 2-4 weeks, neurology clinic follow up in 4-6 weeks and transfer to acute inpatient rehab. Patient at this point is neurologically clear for transfer by this will likely take at least another day to arrange. Discussed with attending, patient and .

## 2018-02-08 NOTE — PROGRESS NOTES
CLINICAL PHARMACY NOTE - New Medication Discharge Counseling    Medication counseling provided to: patient and    New medications reviewed: atorvastatin, lisinopril, clopidogrel, and aspirin     Handouts provided to patient include: Medication Side Effects brochure and [employer] Patient Education handout    Discussed recently initiated medication therapy with patient and caregiver utilizing teachback method. Reviewed uses and possible side effects of medication and answered all medication-related questions. Patient and caregiver verbalized understanding. Barriers to learning: Patient had recent stroke and may not be capable of taking care of her own medications will need  to manage medications. Additional notes: Patient is going to an inpatient rehabilitation center. They will be able to manage her medications and will provide further counseling and clarification if the patient was to have questions.      Bárbara Aguilar   PharmD Candidate [address] [facility]  2/8/2018 1:50 PM;

## 2018-02-09 VITALS
HEIGHT: 65 IN | HEART RATE: 78 BPM | DIASTOLIC BLOOD PRESSURE: 69 MMHG | RESPIRATION RATE: 22 BRPM | SYSTOLIC BLOOD PRESSURE: 160 MMHG | OXYGEN SATURATION: 95 % | WEIGHT: 155.2 LBS | BODY MASS INDEX: 25.86 KG/M2 | TEMPERATURE: 97.4 F

## 2018-02-09 PROCEDURE — 6370000000 HC RX 637 (ALT 250 FOR IP): Performed by: STUDENT IN AN ORGANIZED HEALTH CARE EDUCATION/TRAINING PROGRAM

## 2018-02-09 PROCEDURE — 6370000000 HC RX 637 (ALT 250 FOR IP): Performed by: PSYCHIATRY & NEUROLOGY

## 2018-02-09 PROCEDURE — 94762 N-INVAS EAR/PLS OXIMTRY CONT: CPT

## 2018-02-09 PROCEDURE — 6360000002 HC RX W HCPCS: Performed by: STUDENT IN AN ORGANIZED HEALTH CARE EDUCATION/TRAINING PROGRAM

## 2018-02-09 PROCEDURE — 97535 SELF CARE MNGMENT TRAINING: CPT

## 2018-02-09 PROCEDURE — 2580000003 HC RX 258: Performed by: STUDENT IN AN ORGANIZED HEALTH CARE EDUCATION/TRAINING PROGRAM

## 2018-02-09 PROCEDURE — 99232 SBSQ HOSP IP/OBS MODERATE 35: CPT | Performed by: PSYCHIATRY & NEUROLOGY

## 2018-02-09 PROCEDURE — 99239 HOSP IP/OBS DSCHRG MGMT >30: CPT | Performed by: INTERNAL MEDICINE

## 2018-02-09 RX ORDER — LISINOPRIL 10 MG/1
10 TABLET ORAL DAILY
Qty: 30 TABLET | Refills: 2 | Status: SHIPPED | OUTPATIENT
Start: 2018-02-09 | End: 2018-02-12

## 2018-02-09 RX ORDER — LISINOPRIL 10 MG/1
10 TABLET ORAL DAILY
Status: DISCONTINUED | OUTPATIENT
Start: 2018-02-09 | End: 2018-02-09 | Stop reason: HOSPADM

## 2018-02-09 RX ADMIN — ACETAMINOPHEN 650 MG: 325 TABLET ORAL at 03:53

## 2018-02-09 RX ADMIN — ASPIRIN 81 MG: 81 TABLET, COATED ORAL at 08:51

## 2018-02-09 RX ADMIN — ENOXAPARIN SODIUM 40 MG: 40 INJECTION SUBCUTANEOUS at 08:52

## 2018-02-09 RX ADMIN — CLOPIDOGREL 75 MG: 75 TABLET, FILM COATED ORAL at 08:51

## 2018-02-09 RX ADMIN — Medication 10 ML: at 08:52

## 2018-02-09 RX ADMIN — LISINOPRIL 10 MG: 5 TABLET ORAL at 08:51

## 2018-02-09 ASSESSMENT — PAIN DESCRIPTION - LOCATION: LOCATION: HEAD

## 2018-02-09 ASSESSMENT — PAIN SCALES - GENERAL: PAINLEVEL_OUTOF10: 4

## 2018-02-09 ASSESSMENT — PAIN DESCRIPTION - FREQUENCY: FREQUENCY: INTERMITTENT

## 2018-02-09 ASSESSMENT — PAIN DESCRIPTION - PAIN TYPE: TYPE: ACUTE PAIN

## 2018-02-09 ASSESSMENT — PAIN DESCRIPTION - DESCRIPTORS: DESCRIPTORS: HEADACHE

## 2018-02-09 ASSESSMENT — PAIN DESCRIPTION - ONSET: ONSET: GRADUAL

## 2018-02-09 NOTE — PROGRESS NOTES
left lower extremity symptomatology. Denies speech and swallowing difficulties. Denies vertigo, double vision. Denies history of TIA/CVA.   . HbA1c 5.9. Echocardiogram demonstrates no cardioembolic source. There has been no ectopy on monitor. No current facility-administered medications on file prior to encounter. No current outpatient prescriptions on file prior to encounter. Allergies: Carmen Wylie has No Known Allergies. History reviewed. No pertinent past medical history. History reviewed. No pertinent surgical history.         Medications:     lisinopril  10 mg Oral Daily    aspirin  81 mg Oral Daily    clopidogrel  75 mg Oral Daily    sodium chloride flush  10 mL Intravenous 2 times per day    enoxaparin  40 mg Subcutaneous Daily    atorvastatin  40 mg Oral Nightly     PRN Meds include: sodium chloride flush, acetaminophen, magnesium hydroxide, ondansetron, labetalol    Objective:   BP (!) 160/69   Pulse 78   Temp 97.4 °F (36.3 °C) (Oral)   Resp 22   Ht 5' 5\" (1.651 m)   Wt 155 lb 3.3 oz (70.4 kg)   SpO2 95%   BMI 25.83 kg/m²     Blood pressure range: Systolic (78MDL), ESDRAS:535 , Min:147 , WRA:180   ; Diastolic (94IXC), CCB:63, Min:65, Max:88     ON EXAMINATION:  GENERAL  Appears comfortable and in no distress   HEENT  NC/ AT   NECK  Supple and no bruits heard   Cardiovascular  S1, S2 heard; radial pulse intact   MENTAL STATUS:  Alert, oriented, intact memory, no confusion, normal speech, normal language, no hallucination or delusion   CRANIAL NERVES: II     -       Visual fields intact to confrontation  III,IV,VI -  PRRRE  EOMI without nystagmus, no afferent defect, no                      MISTY, no ptosis  V     -     Normal facial sensation  VII    -     Normal facial symmetry  VIII   -     Intact hearing  IX,X -     Symmetrical palate  XI    -     Symmetrical shoulder shrug  XII   -     Midline tongue, no atrophy    MOTOR FUNCTION: Significant for hypotonia and weakness of grade 0/5 in left upper extremity; otherwise 5/5 in left LE and Rt UE and Rt LE with normal bulk, normal tone, no involuntary movements, no tremor   SENSORY FUNCTION:  Normal touch, normal pin,  normal proprioception, graphesthesia    CEREBELLAR FUNCTION:  No right upper or either lower extremity cerebellar sign, no truncal ataxia    REFLEX FUNCTION:  Symmetric, no perverted reflex, left extensor plantar response and right equivocal plantar response   STATION and GAIT Symmetric  WB, significant hesitation lifting left leg with the effect that the patient takes very small steps with either leg and with nonspecific mild to moderate instability             Data:  Type of Study      TTE procedure:2D Echocardiogram, M-Mode, Doppler, Color Doppler.     Procedure Date  Date: 02/07/2018 Start: 11:00 AM    Study Location: [facility]  Technical Quality: Good visualization    History / Tech. Comments:  Procedure explained to patient. CVA, HTN    Patient Status: Inpatient    Height: 65 inches Weight: 155 pounds BSA: 1.78 m^2 BMI: 25.79 kg/m^2    CONCLUSIONS    Summary  Normal LV size. Mild to moderately increased LV wall thickness. No obvious wall motion abnormality seen. Normal LV systolic function with LVEF 65%. Grade I LV diastolic dysfunction. Normal RV size and function. Normal size LA and RA. Aortic valve is sclerotic, but opens well. Mitral annular calcification without stenosis. Normal aortic root dimension. No significant pericardial effusion. No obvious intra-cardiac mass or shunt noted.     Signature  ----------------------------------------------------------------------------   Electronically signed by Tiffanie Sims(Interpreting physician) on   02/08/2018 09:49 AM  Ct Head Wo Contrast    Result Date: 2/6/2018  REPORT: CT head without contrast TECHNIQUE: Contiguous thin axial slices through the head obtained without IV contrast. INDICATION: Left-sided of the neck was performed with the administration of intravenous contrast. Multiplanar reformatted images are provided for review. MIP images are provided for review. Stenosis of the internal carotid arteries measured using NASCET criteria. Dose modulation, iterative reconstruction, and/or weight based adjustment of the mA/kV was utilized to reduce the radiation dose to as low as reasonably achievable. COMPARISON: None. CT brain performed at [address] [place] [address] HISTORY: LUE weakness Initial presentation. FINDINGS: CTA NECK: AORTIC ARCH/ARCH VESSELS: Mild atherosclerotic calcifications are noted at the origin of the innominate artery. There is no significant stenosis of the origins of the great vessels. There is no significant stenosis of either the right or left subclavian artery. CAROTID ARTERIES: There is no significant stenosis of either the right or left common carotid artery. Mild calcified atherosclerotic plaque is present at the origins of both internal carotid arteries. There is however no significant stenosis of either the right or left internal carotid artery based on NASCET criteria. VERTEBRAL ARTERIES: The vertebral arteries are normal in appearance. There is no significant stenosis or dissection. SOFT TISSUES: The lung apices are clear. There is no pathologically enlarged lymphadenopathy identified. Complete opacification of the left maxillary sinus is present with a thickened and sclerotic appearance of the walls of the left maxillary sinus suggesting chronic left maxillary sinusitis. BONES: No lytic or blastic osseous lesions are identified. CTA HEAD: ANTERIOR CIRCULATION: Calcified atherosclerotic plaque is present within the cavernous segments of the internal carotid arteries. There is no significant stenosis or aneurysm identified within the intracranial segments of the internal carotid arteries.   There is however no significant stenosis of the of both hemispheres. There is no ventriculomegaly or abnormal extra-axial fluid collection present. There is no sellar or suprasellar mass present. ORBITS: Limited evaluation of the orbits is unremarkable. SINUSES: There is complete opacification of the left maxillary sinus. The mastoid air cells are clear. BONES/SOFT TISSUES: Bone marrow signal intensity is normal.     1. Findings consistent with multiple embolic infarcts within the posterior one-half of the right MCA territory involving the cortex and subcortical and deep white matter of the right frontal, temporal and parietal lobes. 2. No acute intracranial hemorrhage. 3. Mild chronic small vessel ischemic changes. The findings were sent to the Radiology Results [address] at 10:35 pm on 2/6/2018to be communicated to a licensed caregiver.        Lab Results:   CBC:   Recent Labs      02/06/18   1220  02/08/18   0957   WBC  10.2  8.7   HGB  14.5  14.2   PLT  221  218     BMP:    Recent Labs      02/06/18   1204  02/06/18   1220  02/08/18   0957   NA   --   137  139   K   --   3.6*  3.8   CL   --   99  102   CO2   --   26  21   BUN   --   9  12   CREATININE   --   0.81  0.89   GLUCOSE  158  158*  171*         Lab Results   Component Value Date    CHOL 219 (H) 02/07/2018    LDLCHOLESTEROL 138 (H) 02/07/2018    HDL 54 02/07/2018    TRIG 133 02/07/2018    ALT 15 02/06/2018    AST 21 02/06/2018    INR 1.0 02/06/2018    LABA1C 5.9 02/07/2018           Impression and Plan: Ms. Noemy Hickman is a 69 y.o. 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    Target SBP at this 0.150170 mmHg for the next several weeks. Attending has started enalapril 2.5 mg daily. Passed bedside swallow and presently on regular consistency diet. Recommend dual antiplatelet therapy for 3 weeks then back to aspirin 81 mg daily, stroke clinic follow up in 2-4 weeks, neurology clinic follow up in 4-6 weeks and transfer to acute inpatient rehab. Patient at this point is neurologically clear for transfer by this will likely take at least another day to arrange. Physiatry consult accomplished 2/8. Discussed with attending, patient and .

## 2018-02-09 NOTE — PLAN OF CARE
Problem: SAFETY  Goal: Free from accidental physical injury  Outcome: Ongoing  Patient remains free from physical injury this shift. Patient is reminded to use call light and wait for assistance before getting out of bed. Bed in lowest position. Will continue to monitor.

## 2018-02-09 NOTE — PROGRESS NOTES
discharge. Short term goal 1: perform G dynamic sitting balance during functional activities to improve balance and safety with ADL routines  Short term goal 2: complete UB ADLs utilizing compensatory strategies and AE PRN with SBA  Short term goal 3: complete LB ADLs utilizing compensatory strategies and AE PRN with CGA   Short term goal 4: complete functional transfers/mobility with hemiwalker and CGA to improve independence with functional transfers/mobility for participation in ADL routines  Short term goal 5: improve LUE strength to 2+/5 to increase LUE participation in ADL routines   Short term goal 6: Family will verbalize G understanding of techniques to assist pt utilize LUE during functional activities  Patient Goals   Patient goals : Pt wants to return home. Therapy Time   Individual Concurrent Group Co-treatment   Time In 0845         Time Out 0915         Minutes 30              Discharge recommendations discussed with patient during initial evaluation.   Juani Kat, S/OT

## 2018-02-09 NOTE — CARE COORDINATION
Spoke with Lelia Pennington and Sonia Morrissey from admissions at [facility]. [phone]. Fax .  is going to take patient to the rehab facility. Will fax JUSTIN and give report number to the nurse.

## 2018-02-10 NOTE — DISCHARGE SUMMARY
[id number]  [phone]    On 3/7/2018  @ 11 am    Paz Edmond MD  [address] [place] [address]  [phone]    On 2/23/2018  @2:40pm        Time Spent on discharge is more than 30 minutes in the examination, evaluation, counseling and review of medications and discharge plan.     Elissa Ormond, MD  PGY-3, Internal Medicine Resident  [facility], [facility]  2/10/2018, 10:46 AM

## 2018-02-12 ENCOUNTER — HOSPITAL ENCOUNTER (EMERGENCY)
Age: 78
Discharge: HOME | End: 2018-02-12
Attending: EMERGENCY MEDICINE
Payer: MEDICARE

## 2018-02-12 VITALS
BODY MASS INDEX: 25.83 KG/M2 | SYSTOLIC BLOOD PRESSURE: 132 MMHG | WEIGHT: 155 LBS | HEIGHT: 65 IN | OXYGEN SATURATION: 97 % | RESPIRATION RATE: 20 BRPM | HEART RATE: 92 BPM | DIASTOLIC BLOOD PRESSURE: 58 MMHG

## 2018-02-12 DIAGNOSIS — I16.0 HYPERTENSIVE URGENCY: Primary | ICD-10-CM

## 2018-02-12 LAB
ABSOLUTE EOS #: 0.1 K/UL (ref 0–0.4)
ABSOLUTE IMMATURE GRANULOCYTE: NORMAL K/UL (ref 0–0.3)
ABSOLUTE LYMPH #: 2.2 K/UL (ref 1–4.8)
ABSOLUTE MONO #: 0.7 K/UL (ref 0–1)
ANION GAP SERPL CALCULATED.3IONS-SCNC: 14 MMOL/L (ref 9–17)
BASOPHILS # BLD: 1 % (ref 0–2)
BASOPHILS ABSOLUTE: 0 K/UL (ref 0–0.2)
BUN BLDV-MCNC: 15 MG/DL (ref 8–23)
BUN/CREAT BLD: 19 (ref 9–20)
CALCIUM SERPL-MCNC: 9 MG/DL (ref 8.6–10.4)
CHLORIDE BLD-SCNC: 105 MMOL/L (ref 98–107)
CO2: 22 MMOL/L (ref 20–31)
CREAT SERPL-MCNC: 0.79 MG/DL (ref 0.5–0.9)
DIFFERENTIAL TYPE: NORMAL
EKG ATRIAL RATE: 92 BPM
EKG P AXIS: 58 DEGREES
EKG P-R INTERVAL: 170 MS
EKG Q-T INTERVAL: 412 MS
EKG QRS DURATION: 90 MS
EKG QTC CALCULATION (BAZETT): 509 MS
EKG R AXIS: -14 DEGREES
EKG T AXIS: 72 DEGREES
EKG VENTRICULAR RATE: 92 BPM
EOSINOPHILS RELATIVE PERCENT: 2 % (ref 0–8)
GFR AFRICAN AMERICAN: >60 ML/MIN
GFR NON-AFRICAN AMERICAN: >60 ML/MIN
GFR SERPL CREATININE-BSD FRML MDRD: ABNORMAL ML/MIN/{1.73_M2}
GFR SERPL CREATININE-BSD FRML MDRD: ABNORMAL ML/MIN/{1.73_M2}
GLUCOSE BLD-MCNC: 119 MG/DL (ref 70–99)
HCT VFR BLD CALC: 41.4 % (ref 36–46)
HEMOGLOBIN: 13.8 G/DL (ref 12–16)
IMMATURE GRANULOCYTES: NORMAL %
INR BLD: 1 (ref 0.9–1.2)
LYMPHOCYTES # BLD: 25 % (ref 24–44)
MCH RBC QN AUTO: 28.4 PG (ref 26–34)
MCHC RBC AUTO-ENTMCNC: 33.4 G/DL (ref 31–37)
MCV RBC AUTO: 85.2 FL (ref 80–100)
MONOCYTES # BLD: 8 % (ref 0–12)
NRBC AUTOMATED: NORMAL PER 100 WBC
PARTIAL THROMBOPLASTIN TIME: 25.9 SEC (ref 23.2–34.4)
PDW BLD-RTO: 13.5 % (ref 12.1–15.2)
PLATELET # BLD: 245 K/UL (ref 140–450)
PLATELET ESTIMATE: NORMAL
PMV BLD AUTO: 9.4 FL (ref 6–12)
POTASSIUM SERPL-SCNC: 3.8 MMOL/L (ref 3.7–5.3)
PROTHROMBIN TIME: 10.1 SEC (ref 9.7–12.2)
RBC # BLD: 4.86 M/UL (ref 4–5.2)
RBC # BLD: NORMAL 10*6/UL
SEG NEUTROPHILS: 64 % (ref 36–66)
SEGMENTED NEUTROPHILS ABSOLUTE COUNT: 5.7 K/UL (ref 1.8–7.7)
SODIUM BLD-SCNC: 141 MMOL/L (ref 135–144)
TSH SERPL DL<=0.05 MIU/L-ACNC: 1.59 MIU/L (ref 0.3–5)
WBC # BLD: 8.7 K/UL (ref 3.5–11)
WBC # BLD: NORMAL 10*3/UL

## 2018-02-12 PROCEDURE — 6360000002 HC RX W HCPCS: Performed by: EMERGENCY MEDICINE

## 2018-02-12 PROCEDURE — 80048 BASIC METABOLIC PNL TOTAL CA: CPT

## 2018-02-12 PROCEDURE — 96374 THER/PROPH/DIAG INJ IV PUSH: CPT

## 2018-02-12 PROCEDURE — 85730 THROMBOPLASTIN TIME PARTIAL: CPT

## 2018-02-12 PROCEDURE — 85610 PROTHROMBIN TIME: CPT

## 2018-02-12 PROCEDURE — 84443 ASSAY THYROID STIM HORMONE: CPT

## 2018-02-12 PROCEDURE — 93005 ELECTROCARDIOGRAM TRACING: CPT

## 2018-02-12 PROCEDURE — 85025 COMPLETE CBC W/AUTO DIFF WBC: CPT

## 2018-02-12 PROCEDURE — 99283 EMERGENCY DEPT VISIT LOW MDM: CPT

## 2018-02-12 PROCEDURE — 36415 COLL VENOUS BLD VENIPUNCTURE: CPT

## 2018-02-12 RX ORDER — CLONIDINE HYDROCHLORIDE 0.1 MG/1
TABLET ORAL
Qty: 30 TABLET | Refills: 0 | Status: SHIPPED | OUTPATIENT
Start: 2018-02-12 | End: 2018-02-22 | Stop reason: ALTCHOICE

## 2018-02-12 RX ORDER — LISINOPRIL 10 MG/1
10 TABLET ORAL ONCE
Status: DISCONTINUED | OUTPATIENT
Start: 2018-02-12 | End: 2018-02-13 | Stop reason: HOSPADM

## 2018-02-12 RX ORDER — LORAZEPAM 2 MG/ML
0.5 INJECTION INTRAMUSCULAR ONCE
Status: COMPLETED | OUTPATIENT
Start: 2018-02-12 | End: 2018-02-12

## 2018-02-12 RX ORDER — CLONIDINE HYDROCHLORIDE 0.1 MG/1
0.1 TABLET ORAL ONCE
Status: DISCONTINUED | OUTPATIENT
Start: 2018-02-12 | End: 2018-02-13 | Stop reason: HOSPADM

## 2018-02-12 RX ORDER — LISINOPRIL 10 MG/1
10 TABLET ORAL 2 TIMES DAILY
Qty: 60 TABLET | Refills: 1 | Status: SHIPPED | OUTPATIENT
Start: 2018-02-12 | End: 2018-03-19 | Stop reason: SDUPTHER

## 2018-02-12 RX ADMIN — LORAZEPAM 0.5 MG: 2 INJECTION INTRAMUSCULAR; INTRAVENOUS at 21:28

## 2018-02-13 ENCOUNTER — TELEPHONE (OUTPATIENT)
Dept: NEUROSURGERY | Age: 78
End: 2018-02-13

## 2018-02-13 NOTE — ED PROVIDER NOTES
History Main Topics    Smoking status: Never Smoker    Smokeless tobacco: Never Used    Alcohol use None    Drug use: Unknown    Sexual activity: Not Asked     Other Topics Concern    None     Social History Narrative    None       REVIEW OF SYSTEMS    Constitutional:  Denies fever, chills, weight loss, Patient has residual left upper extremity weakness following a stroke one week ago. Patient denies  Neurologic deficits. Patient denies headache. Eyes:  Denies photophobia or discharge   HENT:  Denies sore throat or ear pain   Respiratory:  Denies cough or shortness of breath   Cardiovascular:  Denies chest pain, palpitations, or edema. Endocrine:  Denies polyuria or polydypsia   Lymphatic:  Denies swollen glands   Psychiatric:  Denies depression, suicidal ideation or homicidal ideation   All systems negative except as marked. PHYSICAL EXAM    VITAL SIGNS: BP (!) 161/72   Pulse 92   Resp 20   Ht 5' 5\" (1.651 m)   Wt 155 lb (70.3 kg)   SpO2 97%   BMI 25.79 kg/m²    Constitutional:  Well developed, Well nourished, anxious, Non-toxic appearance. HENT:  Normocephalic, Atraumatic, Bilateral external ears normal, Oropharynx moist, No oral exudates, Nose normal. Neck- Normal range of motion, No tenderness, Supple, No stridor. Eyes:  PERRL, EOMI, Conjunctiva normal, No discharge. Respiratory:  Normal breath sounds, No respiratory distress, No wheezing, No chest tenderness. Cardiovascular:  Normal heart rate, Normal rhythm, No murmurs, No rubs, No gallops. GI:  Bowel sounds normal, Soft, No tenderness, No masses, No pulsatile masses. : External genitalia appear normal, No masses or lesions. No discharge. No CVA tenderness. Musculoskeletal:  Intact distal pulses, No edema, No tenderness, No cyanosis, No clubbing. Good range of motion in all major joints. No tenderness to palpation or major deformities noted. Back- No tenderness. Integument:  Warm, Dry, No erythema, No rash.    Lymphatic: lisinopril (PRINIVIL;ZESTRIL) tablet 10 mg (not administered)   cloNIDine (CATAPRES) tablet 0.1 mg (not administered)   LORazepam (ATIVAN) injection 0.5 mg (0.5 mg Intravenous Given 2/12/18 2128)       New Prescriptions from this visit:    New Prescriptions    CLONIDINE (CATAPRES) 0.1 MG TABLET    Take 1 tablet every 4 hours as needed for blood pressure systolic greater than 010 mmHg or diastolic greater than 559 mmHg. Follow-up:  Cleo Padilla MD  [address] [place] [address]  [phone]              Final Impression:   1.  Hypertensive urgency               (Please note that portions of this note were completed with a voice recognition program.  Efforts were made to edit the dictations but occasionally words are mis-transcribed.)        Ryan Olivier DO  02/12/18 [id number]

## 2018-02-13 NOTE — TELEPHONE ENCOUNTER
Luis Mueller from [address] [place] [address] Road called to notify Dr. Romy Villasenor that Zhane's blood pressure was elevated yesterday 202/82. Her PCP called in [facility] but her blood pressure continued to climb. Patient was taken to ED with a BP of 232/90. She was given Lisinopril and discharged to [facility] for continued care. ED Visit details in EPIC.

## 2018-02-14 ENCOUNTER — OUTSIDE SERVICES (OUTPATIENT)
Dept: FAMILY MEDICINE CLINIC | Age: 78
End: 2018-02-14
Payer: MEDICARE

## 2018-02-14 DIAGNOSIS — I63.511 ACUTE ISCHEMIC RIGHT MIDDLE CEREBRAL ARTERY (MCA) STROKE (HCC): Primary | ICD-10-CM

## 2018-02-14 DIAGNOSIS — I66.01 STENOSIS OF RIGHT MIDDLE CEREBRAL ARTERY: ICD-10-CM

## 2018-02-14 DIAGNOSIS — R29.898 LUE WEAKNESS: ICD-10-CM

## 2018-02-14 DIAGNOSIS — I10 ESSENTIAL HYPERTENSION: ICD-10-CM

## 2018-02-14 DIAGNOSIS — F41.9 ANXIETY: ICD-10-CM

## 2018-02-14 PROCEDURE — 1123F ACP DISCUSS/DSCN MKR DOCD: CPT | Performed by: FAMILY MEDICINE

## 2018-02-14 PROCEDURE — 99306 1ST NF CARE HIGH MDM 50: CPT | Performed by: FAMILY MEDICINE

## 2018-02-14 NOTE — PROGRESS NOTES
and this progressively worsened. She went to the ER here in [place] and they transferred her to [facility]. Vs. Accompanied by: daughter and . Prior to Admission medications    Medication Sig Start Date End Date Taking? Authorizing Provider   lisinopril (PRINIVIL;ZESTRIL) 10 MG tablet Take 1 tablet by mouth 2 times daily 2/12/18   Hollie Pipe, DO   cloNIDine (CATAPRES) 0.1 MG tablet Take 1 tablet every 4 hours as needed for blood pressure systolic greater than 220 mmHg or diastolic greater than 538 mmHg. 2/12/18   Hollie Pipe, DO   aspirin 81 MG EC tablet Take 1 tablet by mouth daily 2/9/18   Yamila Ureña MD   atorvastatin (LIPITOR) 40 MG tablet Take 1 tablet by mouth nightly 2/8/18   Yamila Ureña MD   clopidogrel (PLAVIX) 75 MG tablet Take 1 tablet by mouth daily for 20 days 2/9/18 3/1/18  Sophia Wilson MD       ROS:  General Constitutional: Denies chills. Denies fever. Denies headache. Denies lightheadedness. Ophthalmologic: Denies blurred vision. ENT: Denies nasal congestion. Denies sore throat. Denies ear pain and pressure. Respiratory: Denies cough. Denies shortness of breath. Denies wheezing. Cardiovascular: Denies chest pain at rest. Denies irregular heartbeat. Denies palpitations. Gastrointestinal: Denies abdominal pain. Denies blood in the stool. Denies constipation. Denies diarrhea. Denies nausea. Denies vomiting. Genitourinary: Denies blood in the urine. Denies difficulty urinating. Denies frequent urination. Denies painful urination. Denies urinary incontinence  Musculoskeletal: Denies muscle aches. Denies painful joints. Denies swollen joints. Peripheral Vascular: Denies pain/cramping in legs after exertion. Skin: Denies dry skin. Denies itching. Denies rash. Neurologic: Denies falls. Denies dizziness. Denies fainting. Denies tingling/numbness. Psychiatric: Denies sleep disturbance. Admits anxiety. Denies depressed mood. No past surgical history on file.     Family bid. We also discuss deep breathing techniques to help with relaxation. We try to do a round of deep breathing but she seems reluctant to be able to focus to do this with others in the room. I explain that yoga is great for relaxation and mindfulness. I will see her back next week. No orders of the defined types were placed in this encounter. No orders of the defined types were placed in this encounter.       Scribed by: KULDIP Mcdonald

## 2018-02-15 ENCOUNTER — HOSPITAL ENCOUNTER (OUTPATIENT)
Dept: NON INVASIVE DIAGNOSTICS | Age: 78
Discharge: HOME | End: 2018-02-15
Payer: COMMERCIAL

## 2018-02-15 PROCEDURE — 93225 XTRNL ECG REC<48 HRS REC: CPT

## 2018-02-21 ENCOUNTER — OUTSIDE SERVICES (OUTPATIENT)
Dept: FAMILY MEDICINE CLINIC | Age: 78
End: 2018-02-21
Payer: MEDICARE

## 2018-02-21 DIAGNOSIS — I63.511 ACUTE ISCHEMIC RIGHT MIDDLE CEREBRAL ARTERY (MCA) STROKE (HCC): Primary | ICD-10-CM

## 2018-02-21 DIAGNOSIS — I10 ESSENTIAL HYPERTENSION: ICD-10-CM

## 2018-02-21 DIAGNOSIS — R29.898 LUE WEAKNESS: ICD-10-CM

## 2018-02-21 DIAGNOSIS — I66.01 STENOSIS OF RIGHT MIDDLE CEREBRAL ARTERY: ICD-10-CM

## 2018-02-21 PROCEDURE — 99308 SBSQ NF CARE LOW MDM 20: CPT | Performed by: FAMILY MEDICINE

## 2018-02-21 PROCEDURE — 1123F ACP DISCUSS/DSCN MKR DOCD: CPT | Performed by: FAMILY MEDICINE

## 2018-02-21 NOTE — PROGRESS NOTES
The following note was scribed by: Denise Suresh, [address] [facility]  [address] [facility] [address] [facility]  [phone]  Dept: [phone]    Francesco Hayden is a 69 y.o. female here for Follow-up ([facility] Visit)      HPI:  Faxes/Correspondence received since admission:  -On 02/07, request is approved to follow patient during admission at [facility].   -On 02/19, a physician recertification is signed for SNF services s/p hospitalization. Last Visit:  -Due to elevated BP readings during PT, she was started on HCTZ 25 mg qam and prn clonidine was d/c.   -She was also start on Buspar 5 mg bid for anxiety.      Nurse reports:  -that she is improving and is independent with a normal walker now (although therapy has not made her independent, yet). -that her grasp is improving.     Brandy reports:  -that she thinks she is doing pretty good. -denies dizziness/ligthheadedness.  -denies feeling tense, grinding teeth, feeling anxious.      Brandy's family reports:  -admits that she is much calmer now. Vitals: 02/20/2018:  BP: 163/77   SpO2: 98%  Pain level: 0  P: 58  R: 18  T: 97.7  W: 158.2 lb       Prior to Admission medications    Medication Sig Start Date End Date Taking?  Authorizing Provider   metoprolol tartrate (LOPRESSOR) 25 MG tablet Take 25 mg by mouth 2 times daily   Yes Historical Provider, MD   hydrochlorothiazide (HYDRODIURIL) 25 MG tablet Take 25 mg by mouth every morning (before breakfast)   Yes Historical Provider, MD   busPIRone (BUSPAR) 5 MG tablet Take 5 mg by mouth 2 times daily   Yes Historical Provider, MD   lisinopril (PRINIVIL;ZESTRIL) 10 MG tablet Take 1 tablet by mouth 2 times daily 2/12/18  Yes Ember Mcwilliams DO   aspirin 81 MG EC tablet Take 1 tablet by mouth daily 2/9/18  Yes Laura Handley MD   atorvastatin (LIPITOR) 40 MG tablet Take 1 tablet by mouth nightly 2/8/18  Yes Yamila Ureña MD   clopidogrel (PLAVIX) 75 MG follow up with Dr. Mi Erickson shortly after discharge. No orders of the defined types were placed in this encounter. No orders of the defined types were placed in this encounter. The documentation recorded by the scribe, accurately reflects the services I personally performed and the decisions made by me.  Baltazar Borjas MD

## 2018-02-22 RX ORDER — HYDROCHLOROTHIAZIDE 25 MG/1
25 TABLET ORAL
COMMUNITY
End: 2018-03-19 | Stop reason: SDUPTHER

## 2018-02-22 RX ORDER — BUSPIRONE HYDROCHLORIDE 5 MG/1
5 TABLET ORAL 2 TIMES DAILY
COMMUNITY
End: 2018-03-19 | Stop reason: SDUPTHER

## 2018-02-26 ENCOUNTER — HOSPITAL ENCOUNTER (OUTPATIENT)
Dept: OCCUPATIONAL THERAPY | Age: 78
Setting detail: THERAPIES SERIES
Discharge: HOME | End: 2018-02-26
Payer: MEDICARE

## 2018-02-26 ENCOUNTER — HOSPITAL ENCOUNTER (OUTPATIENT)
Dept: PHYSICAL THERAPY | Age: 78
Setting detail: THERAPIES SERIES
Discharge: HOME | End: 2018-02-26
Payer: MEDICARE

## 2018-02-26 PROCEDURE — G8979 MOBILITY GOAL STATUS: HCPCS

## 2018-02-26 PROCEDURE — 97110 THERAPEUTIC EXERCISES: CPT

## 2018-02-26 PROCEDURE — G8978 MOBILITY CURRENT STATUS: HCPCS

## 2018-02-26 PROCEDURE — G0283 ELEC STIM OTHER THAN WOUND: HCPCS

## 2018-02-26 PROCEDURE — 97162 PT EVAL MOD COMPLEX 30 MIN: CPT

## 2018-02-26 PROCEDURE — 97167 OT EVAL HIGH COMPLEX 60 MIN: CPT

## 2018-02-26 NOTE — PROGRESS NOTES
Wrist Extension 4+/5    /     55 3+/5    /     31   Ulnar Deviation 4+/5    /     20 3+/5    /     5   Radial Deviation 4+/5    /     25 3+/5    /     20   Supination 4+/5    /     90 3+/5    /     60   Pronation 4+/5    /     90 3+/5    /     90       ELBOW    MMT/AROM Right Left   Elbow Flexion/extension 4+/5     /     0-160 4-/5     /     0-160       SHOULDER     MMT/AROM Right Left   Shoulder Flexion 4+/5    /     WNL 4-/5    /     131   Shoulder Extension 4+/5    /     WNL 4-/5    /     60   Shoulder IR 4+/5    /     WNL 4-/5    /     36   Shoulder ER 4+/5    /     WNL 4-/5    /     63   Shoulder AB 4+/5    /     WNL 4-/5    /     132     THUMB ROM RIGHT LEFT   MP 65 60   IP 60 50   Zuñiga AB 16.5 cm 10.0 cm   Radial AB 16.0 cm 10.0 cm     /PINCH STRENGTH RIGHT LEFT    71# 20#   2 pt pinch 4# 0#   3 pt pinch 9# 1#   Key/lateral pinch 10# 1#     9 Hole peg test:   R: 21 seconds  L: To be tested; patient became frustrated with difficulty grasping pegs    G Codes:  [] Mobility:    [] Current [] Goal [] Discharge  [x] Carry: [x] Current [x] Goal [] Discharge  [] Body Position:  [] Current [] Goal [] Discharge  [] Self Care:    [] Current [] Goal [] Discharge  [] Other:    [] Current [] Goal [] Discharge    Functional Impairment Current: Functional Impairment Goal:   [] 0%(CH)    [] 0%(CH)  [] 1-19%(CI)     [x] 1-19%(CI)  [x] 20-39%(CJ)    [] 20-39%(CJ)  [] 40-59%(CK)    [] 40-59%(CK)   [] 60-79%(CL)     [] 60-79%(CL)    [] 80-99%(CM)    [] 80-99%(CM)    [] 100%(CN)    [] 100%(CN)  [] NA  [] NA       G Code Functional Impairment determined by:  [] Clinical Judgment   [] Outcome Measure: DASH    Patient Goals: improve function in LUE    Problems:     [] Pain   [] Adherent Scar    [x] ROM  [] Edema  [x] Strength  [] Open Wound  [x] Function  [x] Coordination               [] Sensation           [] Falls: History/Risk of        Subjective report:    Time Frame for Long term goals : 6 weeks    Long term goal 1: Patient to state <20% impairment throughout daily tasks, as measured by the DASH. Long term goal 2: Patient to improve 9 hole peg test to <60 seconds L hand. Long term goal 3: Patient to improve LUE wrist, elbow, and shoulder strength to 4+/5. Long term goal 4:Patient to improve L  to 40#, 2 pt to 2#, 3 pt to 5# and lateral to 7#. Time Frame for Short term goals: 4 weeks    Short term goal 1: Patient to demonstrate independence c HEP. Short term goal 2: Patient to improve L wrist UD to 15, RD to 25, extension to 50 and supination to 75. Short term goal 3: Patient to improve L thumb RAB to 14 cm, PAB to 14 cm. Short term goal 4: Patient to improve MARCIAL L digits >210. ADDITIONAL COMMENTS      Treatment Potential: [x]Good []Fair []Poor    Comments/Assessment: Patient presents post CVA with L side weakness with decreased functional use of L arm with most impairment noted at hand/wrist level. Patient is able to move through full range of motion at shoulder and elbow joints, however, demonstrates weakness. Patient able to flex digits, difficulty noted with extension. Patient with decreased FM and coordination use of L hand. OT to address. Home Program Initiated:   [ x ] Written    [x] Verbal    [x] Demo   Comprehension of Education: [x] Yes [] No [] Needs Review  [x]Plans /[x] Goals, [x]Risks/ [x] Benefits discussed with [x] Patient []Family    ______________________________________________________________________    Updated Plan of Care dates of services to include:       2/26/18            to    4/9/18    Treatment Plan:  Frequency/Duration:   3    Times a week, for   6   Weeks.     [x] Therapeutic Exercise 16742 [x] Electrical Stim [id number]    [x] Manual Therapy 52362                   [x] Attended Electrical Stim [id number]  [x] Therapeutic Activities 97888          [] Jimmye Cooks 07935  [x] Written Home Program                  [] Hot Pack/Cold Pack 96063  [] Iontophoresis

## 2018-02-28 ENCOUNTER — HOSPITAL ENCOUNTER (OUTPATIENT)
Dept: OCCUPATIONAL THERAPY | Age: 78
Setting detail: THERAPIES SERIES
Discharge: HOME | End: 2018-02-28
Payer: MEDICARE

## 2018-02-28 PROCEDURE — 97112 NEUROMUSCULAR REEDUCATION: CPT

## 2018-02-28 PROCEDURE — G0283 ELEC STIM OTHER THAN WOUND: HCPCS

## 2018-03-02 ENCOUNTER — HOSPITAL ENCOUNTER (OUTPATIENT)
Dept: PHYSICAL THERAPY | Age: 78
Setting detail: THERAPIES SERIES
Discharge: HOME | End: 2018-03-02
Payer: MEDICARE

## 2018-03-02 ENCOUNTER — HOSPITAL ENCOUNTER (OUTPATIENT)
Dept: OCCUPATIONAL THERAPY | Age: 78
Setting detail: THERAPIES SERIES
Discharge: HOME | End: 2018-03-02
Payer: MEDICARE

## 2018-03-02 PROCEDURE — 97110 THERAPEUTIC EXERCISES: CPT

## 2018-03-02 PROCEDURE — 97112 NEUROMUSCULAR REEDUCATION: CPT

## 2018-03-02 PROCEDURE — G0283 ELEC STIM OTHER THAN WOUND: HCPCS

## 2018-03-02 NOTE — PROGRESS NOTES
Phone: Temo    Fax: [phone]                       Outpatient Occupational Therapy                 DAILY TREATMENT NOTE    Date: 3/2/2018  Patients Name:  Yesica Lazaro  YOB: 1940 (69 y.o.)  Gender:  female  MRN:  [id number]  [facility] #: [id number]  Diagnosis:   CVA, I63.00  Referring Provider: Dr. Miguel A Saunders: Medicare/BCBS       Total # of Visits to Date: 3       PAIN  [x]No     []Yes      Location:   Pain Rating (0-10 pain scale):   Pain Description:     SUBJECTIVE    States that she feels she continues to improve. Patient repeatedly kept stating that she couldn't do tasks asked throughout tx. Flow Sheet  Exercise Weight/Level Reps/Time Comments    WB and joint compression x x5-10 each with 5-10 second hold LUE shoulder elbow, wrist and digits    [place] Dexterity task       Mirror box      FM dexterity x   Placement of 2\" pegs into board. Completed x10. Max difficulty, unable to manipulate with hand. Pincher grasp to retreive pom poms and place into container. No wrist movements noted, compensated with elbow and shoulder. Retrieval of long pegs and placement into circular container. Max difficulty noted. Used compensation to bring pegs to end of table edge and place into hand. Noted: Patient c cueing to extend fingers to retrieve items.                                                                                                                                                          Modality Flow Sheet:  START STOP Tx Modality     15 minutes Electrical Stim: L wrist/digit flexors/extensors to facilitate return and strengthen muscles. [place], 10/10 cycle time, 2 second ramp, x 15 minutes.  Patient tolerated well.           Ultrasound: ___ W/cm2 x ___ mins  Duty factor: __100%  __50%  __20% __10%  Head size:   MHz: __1mHz __2 mHz  __3mHz  Location:       Hot Pack:       Paraffin:       Cold Pack: []Fatigue     []Other medical complications     []Other  Goal Assessment: [] No Change    [x]Improved  Comments:            PLAN  [x]Continue with current plan of care  [][facility]  []IHold per patient request  [] Change Treatment plan:  [] Insurance hold  __ Other     TIME   Time Treatment session was INITIATED 115   Time Treatment session was STOPPED 215    60         Electronically signed by ERNESTINA Orozco 3/2/2018 3:26 PM

## 2018-03-02 NOTE — PROGRESS NOTES
without LOB  []Met   []Partially met  [x]Not met      []Met   []Partially met  []Not met      []Met   []Partially met  []Not met     Long Term Goals - Time Frame for Long term goals : 4 weeks  Long term goal 1: Patient will improve LE strength to 5/5 in all major joints and planes for ADLs and gait. []Met  []Partially met  [x]Not met   Long term goal 2: Patient will be able to ambulate with SC with supervision. []Met  []Partially met  [x]Not met   Long term goal 3: Patient will be able to negotiate 12 steps safely []Met  []Partially met  [x]Not met   Long term goal 4: Patient will improve VELEZ balance score to >42 to decrease risk of falls.  []Met  []Partially met  [x]Not met     []Met  []Partially met  []Not met       Minutes Tracking:  Time In: 8834  Time Out: [address]  Minutes: [place], [place]    Date: 3/2/2018

## 2018-03-05 ENCOUNTER — HOSPITAL ENCOUNTER (OUTPATIENT)
Dept: PHYSICAL THERAPY | Age: 78
Setting detail: THERAPIES SERIES
Discharge: HOME | End: 2018-03-05
Payer: MEDICARE

## 2018-03-05 ENCOUNTER — HOSPITAL ENCOUNTER (OUTPATIENT)
Dept: OCCUPATIONAL THERAPY | Age: 78
Setting detail: THERAPIES SERIES
Discharge: HOME | End: 2018-03-05
Payer: MEDICARE

## 2018-03-05 PROCEDURE — 97110 THERAPEUTIC EXERCISES: CPT

## 2018-03-05 PROCEDURE — 97112 NEUROMUSCULAR REEDUCATION: CPT

## 2018-03-05 PROCEDURE — G0283 ELEC STIM OTHER THAN WOUND: HCPCS

## 2018-03-05 NOTE — PROGRESS NOTES
Phone: Temo           Fax: [phone]                           Outpatient Physical Therapy                                                                            Daily Note    Patient: Rodrigo Miller : 1940  CSN #: [id number]   Referring Practitioner:  Cynthia Lopez. Jose R Jiménez MD    Referral Date : 18     Date: 3/5/2018    Diagnosis: Cerebral Infarction: I63.00, Muscle weakness: M62.81  Treatment Diagnosis: Cerebral infarction, general weakness    Onset Date: 18  PT Insurance Information: Medicare/[employer]  Total # of Visits Approved: 12 Per Physician Order  Total # of Visits to Date: 3  No Show: 0  Canceled Appointment: 0      Pre-Treatment Pain:  0/10  Subjective: Pt reports she felt fine following last PT visit with not to much fatigue reported. Pt denies current pain. Exercises:  Exercise 1: HEP: clam shells: 2x15, green band, ankle DF: 2x15, blue -Sink ex   Exercise 2: SciFIT, 6 min, level 3.5  Exercise 3: Alternating step taps at [facility] 46. step, x 10 each  Exercise 4: Sit to stand from black chair, 2x5  Exercise 5: FSU 6 inch 10x ea     Exercise 7: forward/ retro/ side step at counter. Exercise 8: Viv amb/ cone amb 5 laps wusing LBQC. Assessment  Assessment: Pt able to complete 6 alt 6 inch toe taps today with no UE support prior to needing UE assist to prevent fall. Patient Education  Gave Pt sink Ex for HEP with appropriate understanding. Pt verbalized/demonstrated good understanding:     [x] Yes         [] No, pt required further clarification.     Post Treatment Pain:  0/10      Plan  Times per week: 3  Plan weeks: 4      Goals  (Total # of Visits to Date: 3)   Short Term Goals - Time Frame for Short term goals: 2 weeks     Short term goal 1: Patient will be independent with a HEP -MET                                        [x]Met   []Partially met  []Not met   Short term goal 2: Patient will be able to perform 10

## 2018-03-05 NOTE — PROGRESS NOTES
Phone: Temo    Fax: [phone]                       Outpatient Occupational Therapy                 DAILY TREATMENT NOTE    Date: 3/5/2018  Patients Name:  Anaya Omer  YOB: 1940 (69 y.o.)  Gender:  female  MRN:  [id number]  [facility] #: [id number]  Diagnosis:   CVA, I63.00  Referring Provider: Dr. Jordan Keene: Medicare/BCBS       Total # of Visits to Date: 4       PAIN  [x]No     []Yes      Location:   Pain Rating (0-10 pain scale):   Pain Description:     SUBJECTIVE   Spouse reported he noted more movement with extension with L index and long finger. Flow Sheet  Exercise Weight/Level Reps/Time Comments    WB and joint compression x x5-10 each with 5-10 second hold LUE shoulder elbow, wrist and digits    [place] Dexterity task          Mirror box         FM dexterity x   Tanogram - patient retrieved and placed items on board to match picture. Required encouragement and mod/max difficulty noted. Button Snake - x5 squares. Max difficulty noted. Cueing for use of B hands. Maze x x5 Min/mod difficulty. Used Upper limb and trunk to compensate. Hand [place] 2 band yellow x10     Flexbar yellow x10 twist     Power Web Yellow x10 , wrist extension and flexion                                                                                                                                 Modality Flow Sheet:  START STOP Tx Modality     15 minutes Electrical Stim: L wrist/digit flexors/extensors to facilitate return and strengthen muscles. [place], 10/10 cycle time, 2 second ramp, x 15 minutes.  Patient tolerated well.            Ultrasound: ___ W/cm2 x ___ mins  Duty factor: __100%  __50%  __20% __10%  Head size:   MHz: __1mHz __2 mHz  __3mHz  Location:       Hot Pack:       Paraffin:       Cold Pack:      GOALS/ TREATMENT SESSION:      Subjective report:       Time Frame for Long term goals : 6 weeks       Long

## 2018-03-07 ENCOUNTER — HOSPITAL ENCOUNTER (OUTPATIENT)
Dept: OCCUPATIONAL THERAPY | Age: 78
Setting detail: THERAPIES SERIES
Discharge: HOME | End: 2018-03-07
Payer: MEDICARE

## 2018-03-07 ENCOUNTER — OFFICE VISIT (OUTPATIENT)
Dept: NEUROLOGY | Age: 78
End: 2018-03-07
Payer: MEDICARE

## 2018-03-07 ENCOUNTER — HOSPITAL ENCOUNTER (OUTPATIENT)
Dept: PHYSICAL THERAPY | Age: 78
Setting detail: THERAPIES SERIES
Discharge: HOME | End: 2018-03-07
Payer: MEDICARE

## 2018-03-07 VITALS
HEIGHT: 65 IN | DIASTOLIC BLOOD PRESSURE: 76 MMHG | BODY MASS INDEX: 26.16 KG/M2 | SYSTOLIC BLOOD PRESSURE: 189 MMHG | HEART RATE: 63 BPM | WEIGHT: 157 LBS

## 2018-03-07 DIAGNOSIS — R20.2 NUMBNESS AND TINGLING OF FOOT: ICD-10-CM

## 2018-03-07 DIAGNOSIS — G62.9 NEUROPATHY: ICD-10-CM

## 2018-03-07 DIAGNOSIS — R29.898 LUE WEAKNESS: ICD-10-CM

## 2018-03-07 DIAGNOSIS — R20.0 NUMBNESS AND TINGLING OF FOOT: ICD-10-CM

## 2018-03-07 DIAGNOSIS — I63.511 ACUTE ISCHEMIC RIGHT MIDDLE CEREBRAL ARTERY (MCA) STROKE (HCC): Primary | ICD-10-CM

## 2018-03-07 PROCEDURE — 97112 NEUROMUSCULAR REEDUCATION: CPT

## 2018-03-07 PROCEDURE — G8484 FLU IMMUNIZE NO ADMIN: HCPCS | Performed by: NURSE PRACTITIONER

## 2018-03-07 PROCEDURE — G8419 CALC BMI OUT NRM PARAM NOF/U: HCPCS | Performed by: NURSE PRACTITIONER

## 2018-03-07 PROCEDURE — 97110 THERAPEUTIC EXERCISES: CPT

## 2018-03-07 PROCEDURE — 1123F ACP DISCUSS/DSCN MKR DOCD: CPT | Performed by: NURSE PRACTITIONER

## 2018-03-07 PROCEDURE — 1036F TOBACCO NON-USER: CPT | Performed by: NURSE PRACTITIONER

## 2018-03-07 PROCEDURE — G8598 ASA/ANTIPLAT THER USED: HCPCS | Performed by: NURSE PRACTITIONER

## 2018-03-07 PROCEDURE — G0283 ELEC STIM OTHER THAN WOUND: HCPCS

## 2018-03-07 PROCEDURE — G8400 PT W/DXA NO RESULTS DOC: HCPCS | Performed by: NURSE PRACTITIONER

## 2018-03-07 PROCEDURE — G8427 DOCREV CUR MEDS BY ELIG CLIN: HCPCS | Performed by: NURSE PRACTITIONER

## 2018-03-07 PROCEDURE — 4040F PNEUMOC VAC/ADMIN/RCVD: CPT | Performed by: NURSE PRACTITIONER

## 2018-03-07 PROCEDURE — 1111F DSCHRG MED/CURRENT MED MERGE: CPT | Performed by: NURSE PRACTITIONER

## 2018-03-07 PROCEDURE — 1090F PRES/ABSN URINE INCON ASSESS: CPT | Performed by: NURSE PRACTITIONER

## 2018-03-07 PROCEDURE — 99214 OFFICE O/P EST MOD 30 MIN: CPT | Performed by: NURSE PRACTITIONER

## 2018-03-07 RX ORDER — CLOPIDOGREL BISULFATE 75 MG/1
75 TABLET ORAL DAILY
COMMUNITY
End: 2018-07-18 | Stop reason: ALTCHOICE

## 2018-03-07 NOTE — PROGRESS NOTES
[facility]            [address]          [place], [address] [facility]          Dept: [phone]          Dept Fax: [phone]        MD Chitra Enrique MD Ahmed B. Bethena Fire, MD Marshal Cocker, MD Michelene Learned, MD Mauri Dick, CNP            3/7/2018    HPI:      Your patient, Issa Aguiar returns for continuing neurologic care. Patient is a 67-year-old woman who was hospitalized at [address] [place] [address] on February 6, 2018 with a right middle cerebral artery stroke, secondary to a right M1 segment severe stenosis. Patient had new onset of left upper extremity weakness associated with numbness since the evening of February 5, 2018. She initially went to [facility] emergency department and was transferred to [facility] CT angiography of the head and neck demonstrated a right M1 segment stenosis versus recanalization. Endovascular neurology evaluated the patient and felt that there was no acute intervention required at that point in time. Patient had not been on any antiplatelet therapy prior to admission. MRI of the brain showed multiple embolic infarcts within the posterior one half of the right middle cerebral artery territory, involving the cortex and subcortical and deep white matter of the right frontal, temporal, and parietal lobes. There was also mild small vessel ischemic disease. Patient had a mild headache at the time, associated with photophobia and phonophobia. Patient denied nausea or vomiting. Since being discharged, patient has continued with physical and occupational therapy. She continues to have mainly left upper extremity weakness, however she can have lower extremity weakness if she becomes fatigued. She is ambulating with a walker mainly due to balance issues.   Previous testing also includes cholesterol 219 HDL 54, LDL 138, triglycerides 133, hemoglobin A1c 5.9 and TSH 1.59. Patient has no complaint of speech or language problems, blurred or double vision, numbness of her extremities, or bowel and bladder difficulties. She mainly has weakness on her left arm as well as gait impairment. History reviewed. No pertinent past medical history. History reviewed. No pertinent surgical history.     Family History   Problem Relation Age of Onset    Coronary Art Dis Maternal Cousin     Cancer Father      prostate    Cancer Paternal Grandfather      stomach       Social History   Substance Use Topics    Smoking status: Never Smoker    Smokeless tobacco: Never Used    Alcohol use No                               REVIEW OF SYSTEMS    CONSTITUTIONAL Weight: absent, Appetite: absent, Fatigue: absent      HEENT Ears: normal, Visual disturbance: absent   RESPIRATORY Shortness of breath: absent, Cough: absent   CARDIOVASCULAR Chest pain: absent, Leg swelling :absent      GI Constipation: absent, Diarrhea: absent, Swallowing change: absent       Urinary frequency: present, Urinary urgency: absent, Urinary incontinence: absent   MUSCULOSKELETAL Neck pain: absent, Back pain: absent, Stiffness: absent, Muscle pain: absent, Joint pain: absent Restless legs: absent   DERMATOLOGIC Hair loss: absent, Skin changes: absent   NEUROLOGIC Memory loss: absent, Confusion: absent, Seizures: absent Trouble walking or imbalance: present, Dizziness: absent, Weakness: present, Numbness: absent Tremor: absent, Spasm: absent, Speech difficulty: absent, Headache: absent, Light sensitivity: absent   PSYCHIATRIC Anxiety: absent, Hallucination: absent, Mood disorder: absent   HEMATOLOGIC Abnormal bleeding/Bruising: present, Anemia: absent, Clotting disorder: absent, Lymph gland changes: absent           No Known Allergies        Current Outpatient Prescriptions   Medication Sig Dispense Refill    clopidogrel (PLAVIX) 75 MG tablet Take 75 mg by mouth daily  metoprolol succinate (TOPROL XL) 25 MG extended release tablet Take 1 tablet by mouth daily 30 tablet 3    hydrochlorothiazide (HYDRODIURIL) 25 MG tablet Take 25 mg by mouth every morning (before breakfast)      busPIRone (BUSPAR) 5 MG tablet Take 5 mg by mouth 2 times daily      lisinopril (PRINIVIL;ZESTRIL) 10 MG tablet Take 1 tablet by mouth 2 times daily 60 tablet 1    aspirin 81 MG EC tablet Take 1 tablet by mouth daily 30 tablet 3    atorvastatin (LIPITOR) 40 MG tablet Take 1 tablet by mouth nightly 30 tablet 3     No current facility-administered medications for this visit. PHYSICAL EXAMINATION       BP (!) 189/76 (Site: Right Arm, Position: Sitting) Comment: Retake. Pulse 63   Ht 5' 5\" (1.651 m)   Wt 157 lb (71.2 kg)   BMI 26.13 kg/m²                                             . General Appearance:  Alert, cooperative, no signs of distress, appears stated age   Head:  Normocephalic, no signs of trauma   Eyes:  Conjunctiva/corneas clear;  eyelids intact   Ears:  Normal external ear and canals   Nose: Nares normal, mucosa normal, no drainage    Throat: Lips and tongue normal; teeth normal;  gums normal   Neck: Supple, intact flexion, extension and rotation;   trachea midline;  no adenopathy;   thyroid: not enlarged;   no carotid pulse abnormality   Back:   Symmetric, no curvature, ROM adequate   Lungs:   Respirations unlabored   Heart:  Regular rate and rhythm           Extremities: Extremities normal, no cyanosis, no edema   Pulses: Symmetric over head and neck   Skin: Skin color, texture normal, no rashes, no lesions                                             NEUROLOGIC EXAMINATION    Neurologic Exam     Mental Status   Oriented to person, place, and time.    Attention: normal.   Speech: speech is normal   Level of consciousness: alert  Normal 4-5 months  2. Neuropathic symptoms with diminished sensation in her feet in a glove stocking distribution   bilaterally         1.   Obtain B12, folate, hemoglobin A1c, and VINAY             Signed: Donny Myers, CNP

## 2018-03-07 NOTE — PROGRESS NOTES
Phone: Temo    Fax: [phone]                       Outpatient Occupational Therapy                 DAILY TREATMENT NOTE    Date: 3/7/2018  Patients Name:  Yesica Lazaro  YOB: 1940 (69 y.o.)  Gender:  female  MRN:  [id number]  [facility] #: [id number]  Diagnosis:   CVA, I63.00  Referring Provider: Dr. Miguel A Saunders: Medicare/[employer]       Total # of Visits to Date: 5       PAIN  [x]No     []Yes      Location:   Pain Rating (0-10 pain scale):   Pain Description:     SUBJECTIVE    Patient reports that she did a few exercises of HEP. Patient had f/u with neuro and they are concerned with the sensation B feet. Patient also stated that her BP was high. Flow Sheet  Exercise Weight/Level Reps/Time Comments    WB and joint compression x x5-10 each with 5-10 second hold LUE shoulder elbow, wrist and digits    [place] Dexterity task          Mirror box         FM dexterity x   Retrieval and placement of circular and flat craft sticks into corresponding holes. Patient completed with increased accuracy and ability to retrieve and hold sticks. Once handed 1/2\" discs, patient able to place into end slots with moderate difficult    Maze      Hand [place] 2 band yellow x10     Flexbar yellow x10 twist     Power Web Yellow x10 , wrist extension and flexion                                                                                                                                 Modality Flow Sheet:  START STOP Tx Modality     15 minutes Electrical Stim: L wrist/digit flexors/extensors to facilitate return and strengthen muscles. [place], 10/10 cycle time, 2 second ramp, x 15 minutes.  Patient tolerated well.            Ultrasound: ___ W/cm2 x ___ mins  Duty factor: __100%  __50%  __20% __10%  Head size:   MHz: __1mHz __2 mHz  __3mHz  Location:       Hot Pack:       Paraffin:       Cold Pack:      GOALS/ TREATMENT SESSION:

## 2018-03-07 NOTE — PROGRESS NOTES
Visits to Date: 4)   Short Term Goals - Time Frame for Short term goals: 2 weeks     Short term goal 1: Patient will be independent with a HEP -MET                                        [x]Met   []Partially met  []Not met   Short term goal 2: Patient will be able to perform 10 alternating step taps to 6 in step without LOB -progressing (6)  []Met   [x]Partially met  []Not met      []Met  []Partially met  []Not met      []Met   []Partially met  []Not met     Long Term Goals - Time Frame for Long term goals : 4 weeks  Long term goal 1: Patient will improve LE strength to 5/5 in all major joints and planes for ADLs and gait. []Met  []Partially met  [x]Not met   Long term goal 2: Patient will be able to ambulate with SC with supervision. []Met  []Partially met  [x]Not met   Long term goal 3: Patient will be able to negotiate 12 steps safely []Met  []Partially met  [x]Not met   Long term goal 4: Patient will improve VELEZ balance score to >42 to decrease risk of falls.  []Met  []Partially met  [x]Not met     []Met  []Partially met  []Not met       Minutes Tracking:  Time In: 1700  Time Out: [address] [facility] 759  Minutes: [address]    Date: 3/7/2018

## 2018-03-08 ENCOUNTER — TELEPHONE (OUTPATIENT)
Dept: NEUROLOGY | Age: 78
End: 2018-03-08

## 2018-03-08 DIAGNOSIS — R73.9 HYPERGLYCEMIA: Primary | ICD-10-CM

## 2018-03-08 NOTE — TELEPHONE ENCOUNTER
Nanda Ludwig with [employer] called. The diagnosis for the HgbA1c doesn't fly for Medicare. Can you give a different diagnosis such as hyperglycemia or diabetes? On 2/6/18 her glucose was 158.

## 2018-03-09 ENCOUNTER — HOSPITAL ENCOUNTER (OUTPATIENT)
Dept: PHYSICAL THERAPY | Age: 78
Setting detail: THERAPIES SERIES
Discharge: HOME | End: 2018-03-09
Payer: MEDICARE

## 2018-03-09 ENCOUNTER — HOSPITAL ENCOUNTER (OUTPATIENT)
Dept: OCCUPATIONAL THERAPY | Age: 78
Setting detail: THERAPIES SERIES
Discharge: HOME | End: 2018-03-09
Payer: MEDICARE

## 2018-03-09 ENCOUNTER — HOSPITAL ENCOUNTER (OUTPATIENT)
Dept: LAB | Age: 78
Discharge: HOME | End: 2018-03-09
Payer: MEDICARE

## 2018-03-09 DIAGNOSIS — I63.511 ACUTE ISCHEMIC RIGHT MIDDLE CEREBRAL ARTERY (MCA) STROKE (HCC): ICD-10-CM

## 2018-03-09 DIAGNOSIS — G62.9 NEUROPATHY: ICD-10-CM

## 2018-03-09 DIAGNOSIS — R73.9 HYPERGLYCEMIA: ICD-10-CM

## 2018-03-09 LAB
ESTIMATED AVERAGE GLUCOSE: 117 MG/DL
FOLATE: 13.2 NG/ML
HBA1C MFR BLD: 5.7 % (ref 4.8–5.9)
VITAMIN B-12: 253 PG/ML (ref 211–946)

## 2018-03-09 PROCEDURE — 97110 THERAPEUTIC EXERCISES: CPT

## 2018-03-09 PROCEDURE — 36415 COLL VENOUS BLD VENIPUNCTURE: CPT

## 2018-03-09 PROCEDURE — 83036 HEMOGLOBIN GLYCOSYLATED A1C: CPT

## 2018-03-09 PROCEDURE — 97112 NEUROMUSCULAR REEDUCATION: CPT

## 2018-03-09 PROCEDURE — 82607 VITAMIN B-12: CPT

## 2018-03-09 PROCEDURE — 82746 ASSAY OF FOLIC ACID SERUM: CPT

## 2018-03-09 PROCEDURE — 86038 ANTINUCLEAR ANTIBODIES: CPT

## 2018-03-09 PROCEDURE — G0283 ELEC STIM OTHER THAN WOUND: HCPCS

## 2018-03-09 NOTE — PROGRESS NOTES
__50%  __20% __10%  Head size:   MHz: __1mHz __2 mHz  __3mHz  Location:       Hot Pack:       Paraffin:       Cold Pack:      GOALS/ TREATMENT SESSION:      Subjective report:       Time Frame for Long term goals : 6 weeks       Long term goal 1: Patient to state <20% impairment throughout daily tasks, as measured by the DASH. Continue []Met  [x]Partially met  []Not met   Long term goal 2: Patient to improve 9 hole peg test to <60 seconds L hand. Continue       []Met  [x]Partially met  []Not met   Long term goal 3: Patient to improve LUE wrist, elbow, and shoulder strength to 4+/5. Continue []Met  [x]Partially met  []Not met   Long term goal 4: Patient to improve L  to 40#, 2 pt to 2#, 3 pt to 5# and lateral to 7#. Continue []Met  [x]Partially met  []Not met   Time Frame for Short term goals: 4 weeks       Short term goal 1: Patient to demonstrate independence c HEP. Met [x]Met  []Partially met  []Not met   Short term goal 2: Patient to improve L wrist UD to 15, RD to 25, extension to 50 and supination to 75. Continue    []Met  [x]Partially met  []Not met   Short term goal 3: Patient to improve L thumb RAB to 14 cm, PAB to 14 cm. Continue []Met  [x]Partially met  []Not met   Short term goal 4: Patient to improve MARCIAL L digits >210.   Continue []Met  [x]Partially met  []Not met   ADDITIONAL COMMENTS         WRIST MMT/AROM Left   Wrist Flexion 3+/5    /     52   Wrist Extension 3+/5    /     30   Ulnar Deviation 3+/5    /     15   Radial Deviation 3+/5    /     20   Supination 3+/5    /     75   Pronation 3+/5    /     80          EDUCATION  New Education provided to patient/family/caregiver:    []Yes:     [x]No (Continued review of prior education)   If yes Education Provided:     Method of Education:     [x]Discussion     []Demonstration    [] Written     []Other  Evaluation of Patients Response to Education:         [x]Patient and or caregiver verbalized understanding  []Patient and or Caregiver Demonstrated without assistance   []Patient and or Caregiver Demonstrated with assistance  []Needs additional instruction to demonstrate understanding of education    ASSESSMENT  Patient tolerated todays treatment session:    [x] Good   []  Fair   []  Poor  Limitations/difficulties with treatment session due to:   []Pain     []Fatigue     []Other medical complications     []Other  Goal Assessment: [] No Change    [x]Improved  Comments:            PLAN  [x]Continue with current plan of care  [][facility]  []IHold per patient request  [] Change Treatment plan:  [] Insurance hold  __ Other     TIME   Time Treatment session was INITIATED 900   Time Treatment session was STOPPED 1005    65         Electronically signed by ERNESTINA Ortiz 3/9/2018 9:09 AM

## 2018-03-12 ENCOUNTER — HOSPITAL ENCOUNTER (OUTPATIENT)
Dept: OCCUPATIONAL THERAPY | Age: 78
Setting detail: THERAPIES SERIES
Discharge: HOME | End: 2018-03-12
Payer: MEDICARE

## 2018-03-12 ENCOUNTER — HOSPITAL ENCOUNTER (OUTPATIENT)
Dept: PHYSICAL THERAPY | Age: 78
Setting detail: THERAPIES SERIES
Discharge: HOME | End: 2018-03-12
Payer: MEDICARE

## 2018-03-12 LAB — ANTI-NUCLEAR ANTIBODY (ANA): NEGATIVE

## 2018-03-12 PROCEDURE — 97112 NEUROMUSCULAR REEDUCATION: CPT

## 2018-03-12 PROCEDURE — G0283 ELEC STIM OTHER THAN WOUND: HCPCS

## 2018-03-12 PROCEDURE — 97110 THERAPEUTIC EXERCISES: CPT

## 2018-03-12 NOTE — PROGRESS NOTES
goal 2: Patient to improve 9 hole peg test to <60 seconds L hand. Continue   unable to grasp pegs this date    []Met  [x]Partially met  []Not met   Long term goal 3: Patient to improve LUE wrist, elbow, and shoulder strength to 4+/5. Continue []Met  [x]Partially met  []Not met   Long term goal 4: Patient to improve L  to 40#, 2 pt to 2#, 3 pt to 5# and lateral to 7#. Continue []Met  [x]Partially met  []Not met   Time Frame for Short term goals: 4 weeks       Short term goal 1: Patient to demonstrate independence c HEP. Met [x]Met  []Partially met  []Not met   Short term goal 2: Patient to improve L wrist UD to 15, RD to 25, extension to 50 and supination to 75. Continue    []Met  [x]Partially met  []Not met   Short term goal 3: Patient to improve L thumb RAB to 14 cm, PAB to 14 cm. Continue []Met  [x]Partially met  []Not met   Short term goal 4: Patient to improve MARCIAL L digits >210.   Continue []Met  [x]Partially met  []Not met   [address] Education provided to patient/family/caregiver:    []Yes:     [x]No (Continued review of prior education)   If yes Education Provided:     Method of Education:     [x]Discussion     []Demonstration    [] Written     []Other  Evaluation of Patients Response to Education:         [x]Patient and or caregiver verbalized understanding  []Patient and or Caregiver Demonstrated without assistance   []Patient and or Caregiver Demonstrated with assistance  []Needs additional instruction to demonstrate understanding of education    ASSESSMENT  Patient tolerated todays treatment session:    [x] Good   []  Fair   []  Poor  Limitations/difficulties with treatment session due to:   []Pain     []Fatigue     []Other medical complications     []Other  Goal Assessment: [] No Change    [x]Improved  Comments:            PLAN  [x]Continue with current plan of care  [][facility]  [][facility] per patient request  [] Change Treatment plan:  [] Insurance hold  __ Other     TIME   Time Treatment session was INITIATED 1105   Time Treatment session was STOPPED 1201    56         Electronically signed by ERNESTINA Gotti 3/12/2018 11:14 AM

## 2018-03-14 ENCOUNTER — HOSPITAL ENCOUNTER (OUTPATIENT)
Dept: PHYSICAL THERAPY | Age: 78
Setting detail: THERAPIES SERIES
Discharge: HOME | End: 2018-03-14
Payer: MEDICARE

## 2018-03-14 ENCOUNTER — HOSPITAL ENCOUNTER (OUTPATIENT)
Dept: OCCUPATIONAL THERAPY | Age: 78
Setting detail: THERAPIES SERIES
Discharge: HOME | End: 2018-03-14
Payer: MEDICARE

## 2018-03-14 PROCEDURE — 97110 THERAPEUTIC EXERCISES: CPT

## 2018-03-14 PROCEDURE — 97140 MANUAL THERAPY 1/> REGIONS: CPT

## 2018-03-14 PROCEDURE — 97112 NEUROMUSCULAR REEDUCATION: CPT

## 2018-03-14 PROCEDURE — 97116 GAIT TRAINING THERAPY: CPT

## 2018-03-14 PROCEDURE — 97530 THERAPEUTIC ACTIVITIES: CPT

## 2018-03-14 PROCEDURE — 97032 APPL MODALITY 1+ESTIM EA 15: CPT

## 2018-03-14 NOTE — PROGRESS NOTES
Phone: Temo    Fax: [phone]                       Outpatient Occupational Therapy                 DAILY TREATMENT NOTE    Date: 3/14/2018  Patients Name:  Leela Lorenzo  YOB: 1940 (69 y.o.)  Gender:  female  MRN:  [id number]  [facility] #: [id number]  Diagnosis:   CVA, I63.00  Referring Provider: Dr. Jaime Lopez: Medicare/BCBS       Total # of Visits to Date: 8       PAIN  [x]No     []Yes      Location:   Pain Rating (0-10 pain scale):   Pain Description:     SUBJECTIVE   Pt reported she does not always do HEP. Emphasized importance of completing isometric ther ex for strengthening this session. Flow Sheet  Exercise Weight/Level Reps/Time Comments    WB and joint compression x x5-10 each with 5-10 second hold LUE shoulder elbow, wrist and digits    [place] Dexterity task          Mirror box         FM dexterity x  15 mins 1) [place] - demo'd mod       Difficulty  2) [employer]s [employer] Sticks in jar - demo'd       mod difficulty   Maze         Hand [place] 2 band yellow x15 Flexion & extension  Min A to hold device for pt. VC to to take RB when pt demo'd fatigue    Flexbar yellow X 15   VC to pt to slow down   Power Web Yellow  x 15     Handmaster Purple with yellow for resistance   and finger extension     AAROM x   Shoulder, Elbow, Wrist and digits  Pt instructed to move joint as far      as she could then therapist     assisted pt to complete ROM                                       Modality Flow Sheet:  START STOP Tx Modality     15 minutes Electrical Stim: L wrist/digit flexors/extensors to facilitate return and strengthen muscles. [place], 10/10 cycle time, 2 second ramp, x 15 minutes.  Patient tolerated well.            Ultrasound: ___ W/cm2 x ___ mins  Duty factor: __100%  __50%  __20% __10%  Head size:   MHz: __1mHz __2 mHz  __3mHz  Location:       Hot Pack:       Paraffin:       Cold Pack:      GOALS/ TREATMENT SESSION:      Subjective report:       Time Frame for Long term goals : 6 weeks       Long term goal 1: Patient to state <20% impairment throughout daily tasks, as measured by the DASH. Continue []Met  [x]Partially met  []Not met   Long term goal 2: Patient to improve 9 hole peg test to <60 seconds L hand. Continue   unable to grasp pegs this date    []Met  [x]Partially met  []Not met   Long term goal 3: Patient to improve LUE wrist, elbow, and shoulder strength to 4+/5. Continue []Met  [x]Partially met  []Not met   Long term goal 4: Patient to improve L  to 40#, 2 pt to 2#, 3 pt to 5# and lateral to 7#. Continue []Met  [x]Partially met  []Not met   Time Frame for Short term goals: 4 weeks       Short term goal 1: Patient to demonstrate independence c HEP. Met [x]Met  []Partially met  []Not met   Short term goal 2: Patient to improve L wrist UD to 15, RD to 25, extension to 50 and supination to 75. Continue    []Met  [x]Partially met  []Not met   Short term goal 3: Patient to improve L thumb RAB to 14 cm, PAB to 14 cm. Continue []Met  [x]Partially met  []Not met   Short term goal 4: Patient to improve MARCIAL L digits >210.   Continue []Met  [x]Partially met  []Not met   ADDITIONAL COMMENTS                  EDUCATION  New Education provided to patient/family/caregiver:    []Yes:     [x]No (Continued review of prior education)  If yes Education Provided:     Method of Education:     [x]Discussion     []Demonstration    [] Written     []Other      ** Reiterated the importance of doing isometric ther ex for strengthening  Evaluation of Patients Response to Education:         [x]Patient and caregiver verbalized understanding  []Patient and or Caregiver Demonstrated without assistance   []Patient and or Caregiver Demonstrated with assistance  []Needs additional instruction to demonstrate understanding of education    ASSESSMENT  Patient tolerated todays treatment session:    [x] Good   []  Fair   [] Poor  Limitations/difficulties with treatment session due to:   []Pain     []Fatigue     []Other medical complications     []Other  Goal Assessment: [] No Change    [x]Improved  Comments:            PLAN  [x]Continue with current plan of care  [][facility]  []IHold per patient request  [] Change Treatment plan:  [] Insurance hold  __ Other     TIME   Time Treatment session was INITIATED 10:15 a   Time Treatment session was STOPPED 11:15 a    60         Electronically signed by NICK Green 3/14/2018 10:33 AM

## 2018-03-16 ENCOUNTER — HOSPITAL ENCOUNTER (OUTPATIENT)
Dept: OCCUPATIONAL THERAPY | Age: 78
Setting detail: THERAPIES SERIES
Discharge: HOME | End: 2018-03-16
Payer: MEDICARE

## 2018-03-16 ENCOUNTER — HOSPITAL ENCOUNTER (OUTPATIENT)
Dept: PHYSICAL THERAPY | Age: 78
Setting detail: THERAPIES SERIES
Discharge: HOME | End: 2018-03-16
Payer: MEDICARE

## 2018-03-16 PROCEDURE — G0283 ELEC STIM OTHER THAN WOUND: HCPCS

## 2018-03-16 PROCEDURE — 97110 THERAPEUTIC EXERCISES: CPT

## 2018-03-16 PROCEDURE — 97116 GAIT TRAINING THERAPY: CPT

## 2018-03-16 PROCEDURE — 97112 NEUROMUSCULAR REEDUCATION: CPT

## 2018-03-16 NOTE — PROGRESS NOTES
understanding:     [x] Yes         [] No, pt required further clarification. Post Treatment Pain:  0/10      Plan  Times per week: 3  Plan weeks: 4      Goals  (Total # of Visits to Date: 8)   Short Term Goals - Time Frame for Short term goals: 2 weeks     Short term goal 1: Patient will be independent with a HEP -MET                                        []Met   []Partially met  []Not met   Short term goal 2: Patient will be able to perform 10 alternating step taps to 6 in step without LOB -progressing (6)  []Met   []Partially met  []Not met      []Met   []Partially met  []Not met      []Met   []Partially met  []Not met     Long Term Goals - Time Frame for Long term goals : 4 weeks  Long term goal 1: Patient will improve LE strength to 5/5 in all major joints and planes for ADLs and gait. []Met  []Partially met  []Not met   Long term goal 2: Patient will be able to ambulate with SC with supervision. []Met  []Partially met  []Not met   Long term goal 3: Patient will be able to negotiate 12 steps safely []Met  []Partially met  []Not met   Long term goal 4: Patient will improve VELEZ balance score to >42 to decrease risk of falls.  []Met  []Partially met  []Not met     []Met  []Partially met  []Not met       Minutes Tracking:  Time In: [address]  Time Out: [facility]  Minutes: [address] ,PTA Date: 3/16/2018

## 2018-03-19 ENCOUNTER — HOSPITAL ENCOUNTER (OUTPATIENT)
Dept: OCCUPATIONAL THERAPY | Age: 78
Setting detail: THERAPIES SERIES
Discharge: HOME | End: 2018-03-19
Payer: MEDICARE

## 2018-03-19 ENCOUNTER — HOSPITAL ENCOUNTER (OUTPATIENT)
Dept: PHYSICAL THERAPY | Age: 78
Setting detail: THERAPIES SERIES
Discharge: HOME | End: 2018-03-19
Payer: MEDICARE

## 2018-03-19 PROCEDURE — 97110 THERAPEUTIC EXERCISES: CPT

## 2018-03-19 PROCEDURE — G8984 CARRY CURRENT STATUS: HCPCS

## 2018-03-19 PROCEDURE — 97116 GAIT TRAINING THERAPY: CPT

## 2018-03-19 PROCEDURE — G0283 ELEC STIM OTHER THAN WOUND: HCPCS

## 2018-03-19 PROCEDURE — 97112 NEUROMUSCULAR REEDUCATION: CPT

## 2018-03-19 PROCEDURE — G8985 CARRY GOAL STATUS: HCPCS

## 2018-03-19 NOTE — PROGRESS NOTES
met  []Not met   Long term goal 4: Patient to improve L  to 40#, 2 pt to 2#, 3 pt to 5# and lateral to 7#. Continue []Met  [x]Partially met  []Not met   Time Frame for Short term goals: 4 weeks       Short term goal 1: Patient to demonstrate independence c HEP. Met [x]Met  []Partially met  []Not met   Short term goal 2: Patient to improve L wrist UD to 15, RD to 25, extension to 50 and supination to 75. Met      [x]Met  []Partially met  []Not met   Short term goal 3: Patient to improve L thumb RAB to 14 cm, PAB to 14 cm. Continue []Met  [x]Partially met  []Not met   Short term goal 4: Patient to improve MARCIAL L digits >210. Continue []Met  [x]Partially met  []Not met   ADDITIONAL COMMENTS                LEFT HAND Index Long Ring Small   MP 0-70 0-82 0-80 0-75   PIP 0-81 30-85 35-90 35-91   DIP 0-51 0-60 25-60 30-55   MARCIAL 202 197 170 156      WRIST MMT/AROM Left   Wrist Flexion 3+/5    /     65   Wrist Extension 3+/5    /     54   Ulnar Deviation 3+/5    /     14   Radial Deviation 3+/5    /     25   Supination 3+/5    /     85   Pronation 3+/5    /     90         ELBOW    MMT/AROM Left   Elbow Flexion/extension 4-/5     /     0-160         SHOULDER     MMT/AROM Left   Shoulder Flexion 4-/5    /     145   Shoulder Extension 4-/5    /     60   Shoulder IR 4-/5    /     36   Shoulder ER 4-/5    /     90   Shoulder AB 4-/5    /     120      THUMB ROM LEFT   MP 60   IP 60   Zuñiga AB 12.0 cm   Radial AB 13.0 cm      /PINCH STRENGTH LEFT    5#   2 pt pinch 0#   3 pt pinch 0#   Key/lateral pinch . 5#      9 Hole peg test:   L: To be tested; patient unable to grasp/hold smaller pegs at this time     EDUCATION  New Education provided to patient/family/caregiver:    []Yes:     [x]No (Continued review of prior education)   If yes Education Provided:     Method of Education:     [x]Discussion     []Demonstration    [] Written     []Other  Evaluation of Patients Response to Education:         [x]Patient and or caregiver verbalized understanding  []Patient and or Caregiver Demonstrated without assistance   []Patient and or Caregiver Demonstrated with assistance  []Needs additional instruction to demonstrate understanding of education    ASSESSMENT  Patient tolerated todays treatment session:    [x] Good   []  Fair   []  Poor  Limitations/difficulties with treatment session due to:   []Pain     []Fatigue     []Other medical complications     []Other  Goal Assessment: [] No Change    [x]Improved  Comments:            PLAN  [x]Continue with current plan of care  [][facility]  []IHold per patient request  [] Change Treatment plan:  [] Insurance hold  __ Other     TIME   Time Treatment session was INITIATED 930   Time Treatment session was STOPPED 1030    60         Electronically signed by ERNESTINA Nuñez 3/19/2018 9:42 AM

## 2018-03-19 NOTE — PROGRESS NOTES
NBOS and ECs all balance exercises performed today with with SBA/CGA     Patient Education  Edu pt on slowing down and focus on balance  Pt verbalized/demonstrated good understanding:     [x] Yes         [] No, pt required further clarification. Post Treatment Pain:  0/10      Plan  Times per week: 3  Plan weeks: 4      Goals  (Total # of Visits to Date: 5)   Short Term Goals - Time Frame for Short term goals: 2 weeks     Short term goal 1: Patient will be independent with a HEP -MET                                        []Met   []Partially met  []Not met   Short term goal 2: Patient will be able to perform 10 alternating step taps to 6 in step without LOB -progressing (6)  []Met   []Partially met  []Not met      []Met   []Partially met  []Not met      []Met   []Partially met  []Not met     Long Term Goals - Time Frame for Long term goals : 4 weeks  Long term goal 1: Patient will improve LE strength to 5/5 in all major joints and planes for ADLs and gait. []Met  []Partially met  []Not met   Long term goal 2: Patient will be able to ambulate with SC with supervision. []Met  []Partially met  []Not met   Long term goal 3: Patient will be able to negotiate 12 steps safely []Met  []Partially met  []Not met   Long term goal 4: Patient will improve VELEZ balance score to >42 to decrease risk of falls.  []Met  []Partially met  []Not met     []Met  []Partially met  []Not met       Minutes Tracking:  Time In: 1042  Time Out: Kendy Jody  Minutes: [facility] ,[facility] Date: 3/19/2018

## 2018-03-19 NOTE — PROGRESS NOTES
Long Ring Small   MP 0-70 0-82 0-80 0-75   PIP 0-81 30-85 35-90 35-91   DIP 0-51 0-60 25-60 30-55   MARCIAL 202 197 170 156      WRIST MMT/AROM Left   Wrist Flexion 3+/5    /     65   Wrist Extension 3+/5    /     08   Ulnar Deviation 3+/5    /     14   Radial Deviation 3+/5    /     25   Supination 3+/5    /     88   Pronation 3+/5    /     90         ELBOW    MMT/AROM Left   Elbow Flexion/extension 4-/5     /     0-160         SHOULDER     MMT/AROM Left   Shoulder Flexion 4-/5    /     592   Shoulder Extension 4-/5    /     60   Shoulder IR 4-/5    /     25   Shoulder ER 4-/5    /     90   Shoulder AB 4-/5    /     120      THUMB ROM LEFT   MP 60   IP 60   Zu[place]iga AB 12.0 cm   Radial AB 13.0 cm      /PINCH STRENGTH LEFT    5#   2 pt pinch 0#   3 pt pinch 0#   Key/lateral pinch . 5#      9 Hole peg test:   L: To be tested; patient unable to grasp/hold smaller pegs at this time       Jordana Villa, OTR/L                      Date: 3/19/2018

## 2018-03-21 ENCOUNTER — HOSPITAL ENCOUNTER (OUTPATIENT)
Dept: OCCUPATIONAL THERAPY | Age: 78
Setting detail: THERAPIES SERIES
Discharge: HOME | End: 2018-03-21
Payer: MEDICARE

## 2018-03-21 ENCOUNTER — HOSPITAL ENCOUNTER (OUTPATIENT)
Dept: PHYSICAL THERAPY | Age: 78
Setting detail: THERAPIES SERIES
Discharge: HOME | End: 2018-03-21
Payer: MEDICARE

## 2018-03-21 PROCEDURE — 97110 THERAPEUTIC EXERCISES: CPT

## 2018-03-21 PROCEDURE — G8978 MOBILITY CURRENT STATUS: HCPCS

## 2018-03-21 PROCEDURE — G8979 MOBILITY GOAL STATUS: HCPCS

## 2018-03-21 PROCEDURE — 97112 NEUROMUSCULAR REEDUCATION: CPT

## 2018-03-21 PROCEDURE — G0283 ELEC STIM OTHER THAN WOUND: HCPCS

## 2018-03-21 NOTE — PROGRESS NOTES
Phone: Temo    Fax: [phone]                       Outpatient Occupational Therapy                 DAILY TREATMENT NOTE    Date: 3/21/2018  Patients Name:  Domi Srivastava  YOB: 1940 (69 y.o.)  Gender:  female  MRN:  [id number]  [facility] #: [id number]  Diagnosis:   CVA, I63.00  Referring Provider: Dr. Susan Garcia: Medicare/[facility]       Total # of Visits to Date: 6       PAIN  [x]No     []Yes      Location:   Pain Rating (0-10 pain scale):   Pain Description:     SUBJECTIVE    stated that they worked a lot on her shoulder yesterday. Flow Sheet  Exercise Weight/Level Reps/Time Comments    WB and joint compression x x5-10 each with 5-10 second hold LUE shoulder elbow, wrist and digits    [place] Dexterity task          Mirror box         FM dexterity x     1.5\" pegs in board. Patient able to retrieve and place 10 pegs with moderate difficulty. With same pegs, patient instructed to complete in hand manipulation to turn. Patient required max assist to complete. Maze         Hand [place]         Flexbar         Power Web          Handmaster          A/AROM x   Shoulder, elbow, wrist and digits    Putty      Resistive UE 1# Wrist weights x15 Shoulder flexion, abduction, elbow flexion and extension; supination and wrist flexion/extension completed with orange flexbar                   Modality Flow Sheet:  START STOP Tx Modality     15 minutes Electrical Stim: L wrist/digit flexors/extensors to facilitate return and strengthen muscles. [place], 10/10 cycle time, 2 second ramp, x 15 minutes.  Patient tolerated well.            Ultrasound: ___ W/cm2 x ___ mins  Duty factor: __100%  __50%  __20% __10%  Head size:   MHz: __1mHz __2 mHz  __3mHz  Location:       Hot Pack:       Paraffin:       Cold Pack:      GOALS/ TREATMENT SESSION:      Subjective report:       Time Frame for Long term goals : 6 weeks       Long term

## 2018-03-23 ENCOUNTER — HOSPITAL ENCOUNTER (OUTPATIENT)
Dept: OCCUPATIONAL THERAPY | Age: 78
Setting detail: THERAPIES SERIES
Discharge: HOME | End: 2018-03-23
Payer: MEDICARE

## 2018-03-23 ENCOUNTER — HOSPITAL ENCOUNTER (OUTPATIENT)
Dept: PHYSICAL THERAPY | Age: 78
Setting detail: THERAPIES SERIES
Discharge: HOME | End: 2018-03-23
Payer: MEDICARE

## 2018-03-23 PROCEDURE — 97110 THERAPEUTIC EXERCISES: CPT

## 2018-03-23 PROCEDURE — 97112 NEUROMUSCULAR REEDUCATION: CPT

## 2018-03-23 PROCEDURE — 97116 GAIT TRAINING THERAPY: CPT

## 2018-03-23 PROCEDURE — G0283 ELEC STIM OTHER THAN WOUND: HCPCS

## 2018-03-23 NOTE — PROGRESS NOTES
Phone: Temo           Fax: [phone]                           Outpatient Physical Therapy                                                                            Daily Note    Patient: Anaya Omer : 1940  CSN #: [id number]   Referring Practitioner:  Rebekah Ricketts MD    Referral Date : 18     Date: 3/23/2018    Diagnosis: Cerebral Infarction: I63.00, Muscle weakness: M62.81  Treatment Diagnosis: Cerebral infarction, general weakness    Onset Date: 18  PT Insurance Information: Medicare/[employer]  Total # of Visits Approved: 12 Per Physician Order  Total # of Visits to Date: 11  No Show: 0  Canceled Appointment: 0      Pre-Treatment Pain:  0/10  Subjective: Patient reports some fatigue following OT. She reports mobility is improving overall. Exercises:     Exercise 2: SciFIT, 10 min, level 4.0     Exercise 4: Sit to stand from black chair, 2x8  Exercise 5: FSU/LSU/retro 8 inch 10x ea        Exercise 10: Ambulation large laps without AD              Exercise 15: Negotiating 4 steps up and down with reciprical pattern, using one hand rail. Exercise 16: Viv: step over/lateral 6\", x10 each     Exercise 17: SLS 2x10 sec each leg  Exercise 18: Airex march x1 min. Exercise 19: Retro ambulation with resistance: 10#, x5       Assessment  Body structures, Functions, Activity limitations: Decreased functional mobility , Decreased ADL status, Decreased strength, Decreased endurance, Decreased balance  Assessment: Patient able to negotiate steps without LOB or safety concerns. Patient ambulated around gym focusing on L arm swing. Patient reported fatigue following her tx session. Patient Education  Patient educated on exercise rationale   Pt verbalized/demonstrated good understanding:     [x] Yes         [] No, pt required further clarification.     Post Treatment Pain:  0/10      Plan  Times per week: 3  Plan weeks: 4      Goals  (Total # of Visits to Date: 6)   Short Term Goals - Time Frame for Short term goals: 2 weeks    Short term goal 1: Patient will be independent with a HEP -MET                                        [x]Met   []Partially met  []Not met   Short term goal 2: Patient will be able to perform 10 alternating step taps to 6 in step without LOB -MET  [x]Met   []Partially met  []Not met      []Met   []Partially met  []Not met      []Met   []Partially met  []Not met     Long Term Goals - Time Frame for Long term goals : 4 weeks  Long term goal 1: Patient will improve LE strength to 5/5 in all major joints and planes for ADLs and gait. []Met  []Partially met  []Not met   Long term goal 2: Patient will be able to ambulate with SC with supervision. []Met  []Partially met  []Not met   Long term goal 3: Patient will be able to negotiate 12 steps safely []Met  []Partially met  []Not met   Long term goal 4: Patient will improve VELEZ balance score to >42 to decrease risk of falls.  []Met  []Partially met  []Not met     []Met  []Partially met  []Not met       Minutes Tracking:  Time In: 1100  Time Out: [facility]  Minutes: [facility] PT, DPT Date: 3/23/2018

## 2018-03-23 NOTE — PROGRESS NOTES
Treatment plan:  [] Insurance hold  __ Other     TIME   Time Treatment session was INITIATED 1005   Time Treatment session was STOPPED 1105    60         Electronically signed by ERNESTINA Thornton 3/23/2018 3:22 PM    '

## 2018-03-26 ENCOUNTER — HOSPITAL ENCOUNTER (OUTPATIENT)
Dept: PHYSICAL THERAPY | Age: 78
Setting detail: THERAPIES SERIES
Discharge: HOME | End: 2018-03-26
Payer: MEDICARE

## 2018-03-26 ENCOUNTER — HOSPITAL ENCOUNTER (OUTPATIENT)
Dept: OCCUPATIONAL THERAPY | Age: 78
Setting detail: THERAPIES SERIES
Discharge: HOME | End: 2018-03-26
Payer: MEDICARE

## 2018-03-26 PROCEDURE — G0283 ELEC STIM OTHER THAN WOUND: HCPCS

## 2018-03-26 PROCEDURE — 97112 NEUROMUSCULAR REEDUCATION: CPT

## 2018-03-26 PROCEDURE — 97110 THERAPEUTIC EXERCISES: CPT

## 2018-03-28 ENCOUNTER — HOSPITAL ENCOUNTER (OUTPATIENT)
Dept: PHYSICAL THERAPY | Age: 78
Setting detail: THERAPIES SERIES
Discharge: HOME | End: 2018-03-28
Payer: MEDICARE

## 2018-03-28 ENCOUNTER — HOSPITAL ENCOUNTER (OUTPATIENT)
Dept: OCCUPATIONAL THERAPY | Age: 78
Setting detail: THERAPIES SERIES
Discharge: HOME | End: 2018-03-28
Payer: MEDICARE

## 2018-03-28 PROCEDURE — 97112 NEUROMUSCULAR REEDUCATION: CPT

## 2018-03-28 PROCEDURE — 97110 THERAPEUTIC EXERCISES: CPT

## 2018-03-28 PROCEDURE — G0283 ELEC STIM OTHER THAN WOUND: HCPCS

## 2018-03-30 ENCOUNTER — HOSPITAL ENCOUNTER (OUTPATIENT)
Dept: PHYSICAL THERAPY | Age: 78
Setting detail: THERAPIES SERIES
Discharge: HOME | End: 2018-03-30
Payer: MEDICARE

## 2018-03-30 ENCOUNTER — HOSPITAL ENCOUNTER (OUTPATIENT)
Dept: OCCUPATIONAL THERAPY | Age: 78
Setting detail: THERAPIES SERIES
Discharge: HOME | End: 2018-03-30
Payer: MEDICARE

## 2018-03-30 PROCEDURE — G0283 ELEC STIM OTHER THAN WOUND: HCPCS

## 2018-03-30 PROCEDURE — 97110 THERAPEUTIC EXERCISES: CPT

## 2018-03-30 PROCEDURE — 97112 NEUROMUSCULAR REEDUCATION: CPT

## 2018-03-30 NOTE — PROGRESS NOTES
[facility]           Fax: [phone]                           Outpatient Physical Therapy                                                                            Daily Note    Patient: Cheri Jha : 1940  CSN #: [id number]   Referring Practitioner:  Geeta Zarco. Michelle Moreira MD    Referral Date : 18     Date: 3/30/2018    Diagnosis: Cerebral Infarction: I63.00, Muscle weakness: M62.81  Treatment Diagnosis: Cerebral infarction, general weakness    Onset Date: 18  PT Insurance Information: Medicare/BCBS  Total # of Visits Approved: 12 Per Physician Order  Total # of Visits to Date: 14  No Show: 0  Canceled Appointment: 0      Pre-Treatment Pain:  0/10  Subjective: Pt denies pain today. Pt reports increase in edema in the left hand after therapy last visit. Has gone down since then but still a little swelling remains. Exercises:  Exercise 1: HEP: clam shells: 2x15, green band, ankle DF: 2x15, blue -Sink ex   Exercise 2: SciFIT, 10 min, level 4.0  Exercise 3: -4 way cone walk 4 laps      Exercise 5: FSU/LSU/retro 8 inch ( 6 in today) 10x ea      Exercise 10: Ambulation large laps without AD -fast/ slow walk today   Exercise 11: Viv step over and back (fwd, sideways) 15x ea      Exercise 13: StarTrac Knee Flex 5pl , SL Ext 2pl 2x10 ea. Exercise 14: Gait with 4 cone pattern x2     Exercise 16: Viv: step over/lateral 6\", x10 each     Exercise 18: -hospital stairwell      Assessment  Assessment: Pt ascend<>descend 13 steps today in recip motion using HR in RUE only without LOB noted. Pt progressing with balance. Patient Education  Cont working on gait with LRAD at home with . Pt verbalized/demonstrated good understanding:     [x] Yes         [] No, pt required further clarification.     Post Treatment Pain:  0/10      Plan  Times per week: 3  Plan weeks: 4      Goals  (Total # of Visits to Date: 15)   Short Term Goals - Time

## 2018-04-02 ENCOUNTER — HOSPITAL ENCOUNTER (OUTPATIENT)
Dept: PHYSICAL THERAPY | Age: 78
Setting detail: THERAPIES SERIES
Discharge: HOME | End: 2018-04-02
Payer: MEDICARE

## 2018-04-02 ENCOUNTER — HOSPITAL ENCOUNTER (OUTPATIENT)
Dept: OCCUPATIONAL THERAPY | Age: 78
Setting detail: THERAPIES SERIES
Discharge: HOME | End: 2018-04-02
Payer: MEDICARE

## 2018-04-02 PROCEDURE — G0283 ELEC STIM OTHER THAN WOUND: HCPCS

## 2018-04-02 PROCEDURE — 97110 THERAPEUTIC EXERCISES: CPT

## 2018-04-02 PROCEDURE — 97530 THERAPEUTIC ACTIVITIES: CPT

## 2018-04-02 PROCEDURE — 97112 NEUROMUSCULAR REEDUCATION: CPT

## 2018-04-04 ENCOUNTER — HOSPITAL ENCOUNTER (OUTPATIENT)
Dept: PHYSICAL THERAPY | Age: 78
Setting detail: THERAPIES SERIES
Discharge: HOME | End: 2018-04-04
Payer: MEDICARE

## 2018-04-04 ENCOUNTER — HOSPITAL ENCOUNTER (OUTPATIENT)
Dept: OCCUPATIONAL THERAPY | Age: 78
Setting detail: THERAPIES SERIES
Discharge: HOME | End: 2018-04-04
Payer: MEDICARE

## 2018-04-04 PROCEDURE — 97110 THERAPEUTIC EXERCISES: CPT

## 2018-04-04 PROCEDURE — 97112 NEUROMUSCULAR REEDUCATION: CPT

## 2018-04-04 PROCEDURE — G0283 ELEC STIM OTHER THAN WOUND: HCPCS

## 2018-04-06 ENCOUNTER — HOSPITAL ENCOUNTER (OUTPATIENT)
Dept: OCCUPATIONAL THERAPY | Age: 78
Setting detail: THERAPIES SERIES
Discharge: HOME | End: 2018-04-06
Payer: MEDICARE

## 2018-04-06 ENCOUNTER — HOSPITAL ENCOUNTER (OUTPATIENT)
Dept: PHYSICAL THERAPY | Age: 78
Setting detail: THERAPIES SERIES
Discharge: HOME | End: 2018-04-06
Payer: MEDICARE

## 2018-04-06 PROCEDURE — 97112 NEUROMUSCULAR REEDUCATION: CPT

## 2018-04-06 PROCEDURE — G0283 ELEC STIM OTHER THAN WOUND: HCPCS

## 2018-04-06 PROCEDURE — 97110 THERAPEUTIC EXERCISES: CPT

## 2018-04-09 ENCOUNTER — HOSPITAL ENCOUNTER (OUTPATIENT)
Dept: PHYSICAL THERAPY | Age: 78
Setting detail: THERAPIES SERIES
Discharge: HOME | End: 2018-04-09
Payer: MEDICARE

## 2018-04-09 ENCOUNTER — HOSPITAL ENCOUNTER (OUTPATIENT)
Dept: OCCUPATIONAL THERAPY | Age: 78
Setting detail: THERAPIES SERIES
Discharge: HOME | End: 2018-04-09
Payer: MEDICARE

## 2018-04-09 PROCEDURE — 97110 THERAPEUTIC EXERCISES: CPT

## 2018-04-09 PROCEDURE — G0283 ELEC STIM OTHER THAN WOUND: HCPCS

## 2018-04-09 PROCEDURE — 97112 NEUROMUSCULAR REEDUCATION: CPT

## 2018-04-11 ENCOUNTER — HOSPITAL ENCOUNTER (OUTPATIENT)
Dept: PHYSICAL THERAPY | Age: 78
Setting detail: THERAPIES SERIES
Discharge: HOME | End: 2018-04-11
Payer: MEDICARE

## 2018-04-11 ENCOUNTER — HOSPITAL ENCOUNTER (OUTPATIENT)
Dept: OCCUPATIONAL THERAPY | Age: 78
Setting detail: THERAPIES SERIES
Discharge: HOME | End: 2018-04-11
Payer: MEDICARE

## 2018-04-11 PROCEDURE — G8984 CARRY CURRENT STATUS: HCPCS

## 2018-04-11 PROCEDURE — G8985 CARRY GOAL STATUS: HCPCS

## 2018-04-11 PROCEDURE — 97112 NEUROMUSCULAR REEDUCATION: CPT

## 2018-04-11 PROCEDURE — G0283 ELEC STIM OTHER THAN WOUND: HCPCS

## 2018-04-11 PROCEDURE — 97110 THERAPEUTIC EXERCISES: CPT

## 2018-04-13 ENCOUNTER — HOSPITAL ENCOUNTER (OUTPATIENT)
Dept: PHYSICAL THERAPY | Age: 78
Setting detail: THERAPIES SERIES
Discharge: HOME | End: 2018-04-13
Payer: MEDICARE

## 2018-04-13 ENCOUNTER — HOSPITAL ENCOUNTER (OUTPATIENT)
Dept: OCCUPATIONAL THERAPY | Age: 78
Setting detail: THERAPIES SERIES
Discharge: HOME | End: 2018-04-13
Payer: MEDICARE

## 2018-04-13 PROCEDURE — G8979 MOBILITY GOAL STATUS: HCPCS

## 2018-04-13 PROCEDURE — 97112 NEUROMUSCULAR REEDUCATION: CPT

## 2018-04-13 PROCEDURE — 97110 THERAPEUTIC EXERCISES: CPT

## 2018-04-13 PROCEDURE — G8978 MOBILITY CURRENT STATUS: HCPCS

## 2018-04-13 PROCEDURE — G0283 ELEC STIM OTHER THAN WOUND: HCPCS

## 2018-04-16 ENCOUNTER — HOSPITAL ENCOUNTER (OUTPATIENT)
Dept: OCCUPATIONAL THERAPY | Age: 78
Setting detail: THERAPIES SERIES
Discharge: HOME | End: 2018-04-16
Payer: MEDICARE

## 2018-04-16 ENCOUNTER — HOSPITAL ENCOUNTER (OUTPATIENT)
Dept: PHYSICAL THERAPY | Age: 78
Setting detail: THERAPIES SERIES
Discharge: HOME | End: 2018-04-16
Payer: MEDICARE

## 2018-04-16 PROCEDURE — G0283 ELEC STIM OTHER THAN WOUND: HCPCS

## 2018-04-16 PROCEDURE — 97110 THERAPEUTIC EXERCISES: CPT

## 2018-04-16 PROCEDURE — 97112 NEUROMUSCULAR REEDUCATION: CPT

## 2018-04-18 ENCOUNTER — HOSPITAL ENCOUNTER (OUTPATIENT)
Dept: PHYSICAL THERAPY | Age: 78
Setting detail: THERAPIES SERIES
Discharge: HOME | End: 2018-04-18
Payer: MEDICARE

## 2018-04-18 ENCOUNTER — HOSPITAL ENCOUNTER (OUTPATIENT)
Dept: OCCUPATIONAL THERAPY | Age: 78
Setting detail: THERAPIES SERIES
Discharge: HOME | End: 2018-04-18
Payer: MEDICARE

## 2018-04-18 PROCEDURE — 97110 THERAPEUTIC EXERCISES: CPT

## 2018-04-18 PROCEDURE — 97112 NEUROMUSCULAR REEDUCATION: CPT

## 2018-04-18 PROCEDURE — G0283 ELEC STIM OTHER THAN WOUND: HCPCS

## 2018-04-20 ENCOUNTER — HOSPITAL ENCOUNTER (OUTPATIENT)
Dept: OCCUPATIONAL THERAPY | Age: 78
Setting detail: THERAPIES SERIES
Discharge: HOME | End: 2018-04-20
Payer: MEDICARE

## 2018-04-20 ENCOUNTER — HOSPITAL ENCOUNTER (OUTPATIENT)
Dept: PHYSICAL THERAPY | Age: 78
Setting detail: THERAPIES SERIES
Discharge: HOME | End: 2018-04-20
Payer: MEDICARE

## 2018-04-20 PROCEDURE — 97110 THERAPEUTIC EXERCISES: CPT

## 2018-04-20 PROCEDURE — G0283 ELEC STIM OTHER THAN WOUND: HCPCS

## 2018-04-20 PROCEDURE — G8980 MOBILITY D/C STATUS: HCPCS

## 2018-04-20 PROCEDURE — G8979 MOBILITY GOAL STATUS: HCPCS

## 2018-04-20 PROCEDURE — 97112 NEUROMUSCULAR REEDUCATION: CPT

## 2018-04-20 PROCEDURE — 97530 THERAPEUTIC ACTIVITIES: CPT

## 2018-04-23 ENCOUNTER — HOSPITAL ENCOUNTER (OUTPATIENT)
Dept: OCCUPATIONAL THERAPY | Age: 78
Setting detail: THERAPIES SERIES
Discharge: HOME | End: 2018-04-23
Payer: MEDICARE

## 2018-04-23 PROCEDURE — 97110 THERAPEUTIC EXERCISES: CPT

## 2018-04-23 PROCEDURE — G0283 ELEC STIM OTHER THAN WOUND: HCPCS

## 2018-04-23 PROCEDURE — 97112 NEUROMUSCULAR REEDUCATION: CPT

## 2018-04-25 ENCOUNTER — HOSPITAL ENCOUNTER (OUTPATIENT)
Dept: OCCUPATIONAL THERAPY | Age: 78
Setting detail: THERAPIES SERIES
Discharge: HOME | End: 2018-04-25
Payer: MEDICARE

## 2018-04-25 PROCEDURE — 97112 NEUROMUSCULAR REEDUCATION: CPT

## 2018-04-25 PROCEDURE — G0283 ELEC STIM OTHER THAN WOUND: HCPCS

## 2018-04-25 PROCEDURE — 97110 THERAPEUTIC EXERCISES: CPT

## 2018-04-27 ENCOUNTER — HOSPITAL ENCOUNTER (OUTPATIENT)
Dept: OCCUPATIONAL THERAPY | Age: 78
Setting detail: THERAPIES SERIES
Discharge: HOME | End: 2018-04-27
Payer: MEDICARE

## 2018-04-27 PROCEDURE — 97110 THERAPEUTIC EXERCISES: CPT

## 2018-04-27 PROCEDURE — G0283 ELEC STIM OTHER THAN WOUND: HCPCS

## 2018-04-27 PROCEDURE — 97112 NEUROMUSCULAR REEDUCATION: CPT

## 2018-04-30 ENCOUNTER — HOSPITAL ENCOUNTER (OUTPATIENT)
Dept: OCCUPATIONAL THERAPY | Age: 78
Setting detail: THERAPIES SERIES
Discharge: HOME | End: 2018-04-30
Payer: MEDICARE

## 2018-04-30 PROCEDURE — 97110 THERAPEUTIC EXERCISES: CPT

## 2018-04-30 PROCEDURE — 97112 NEUROMUSCULAR REEDUCATION: CPT

## 2018-04-30 PROCEDURE — G0283 ELEC STIM OTHER THAN WOUND: HCPCS

## 2018-05-02 ENCOUNTER — HOSPITAL ENCOUNTER (OUTPATIENT)
Dept: OCCUPATIONAL THERAPY | Age: 78
Setting detail: THERAPIES SERIES
Discharge: HOME | End: 2018-05-02
Payer: MEDICARE

## 2018-05-02 PROCEDURE — 97110 THERAPEUTIC EXERCISES: CPT

## 2018-05-02 PROCEDURE — G0283 ELEC STIM OTHER THAN WOUND: HCPCS

## 2018-05-02 PROCEDURE — 97112 NEUROMUSCULAR REEDUCATION: CPT

## 2018-05-04 ENCOUNTER — HOSPITAL ENCOUNTER (OUTPATIENT)
Dept: OCCUPATIONAL THERAPY | Age: 78
Setting detail: THERAPIES SERIES
Discharge: HOME | End: 2018-05-04
Payer: MEDICARE

## 2018-05-04 PROCEDURE — G8984 CARRY CURRENT STATUS: HCPCS

## 2018-05-04 PROCEDURE — 97112 NEUROMUSCULAR REEDUCATION: CPT

## 2018-05-04 PROCEDURE — 97110 THERAPEUTIC EXERCISES: CPT

## 2018-05-04 PROCEDURE — G8985 CARRY GOAL STATUS: HCPCS

## 2018-05-04 PROCEDURE — G0283 ELEC STIM OTHER THAN WOUND: HCPCS

## 2018-05-07 ENCOUNTER — HOSPITAL ENCOUNTER (OUTPATIENT)
Dept: OCCUPATIONAL THERAPY | Age: 78
Setting detail: THERAPIES SERIES
Discharge: HOME | End: 2018-05-07
Payer: MEDICARE

## 2018-05-07 PROCEDURE — 97110 THERAPEUTIC EXERCISES: CPT

## 2018-05-07 PROCEDURE — 97112 NEUROMUSCULAR REEDUCATION: CPT

## 2018-05-07 PROCEDURE — G0283 ELEC STIM OTHER THAN WOUND: HCPCS

## 2018-05-09 ENCOUNTER — HOSPITAL ENCOUNTER (OUTPATIENT)
Dept: LAB | Age: 78
Discharge: HOME | End: 2018-05-09
Payer: MEDICARE

## 2018-05-09 ENCOUNTER — HOSPITAL ENCOUNTER (OUTPATIENT)
Dept: OCCUPATIONAL THERAPY | Age: 78
Setting detail: THERAPIES SERIES
Discharge: HOME | End: 2018-05-09
Payer: MEDICARE

## 2018-05-09 DIAGNOSIS — I63.00 CEREBROVASCULAR ACCIDENT (CVA) DUE TO THROMBOSIS OF PRECEREBRAL ARTERY (HCC): ICD-10-CM

## 2018-05-09 LAB
CHOLESTEROL, FASTING: 102 MG/DL
CHOLESTEROL/HDL RATIO: 2.3
HDLC SERPL-MCNC: 45 MG/DL
LDL CHOLESTEROL: 31 MG/DL (ref 0–130)
TRIGLYCERIDE, FASTING: 132 MG/DL
VLDLC SERPL CALC-MCNC: NORMAL MG/DL (ref 1–30)

## 2018-05-09 PROCEDURE — 36415 COLL VENOUS BLD VENIPUNCTURE: CPT

## 2018-05-09 PROCEDURE — G0283 ELEC STIM OTHER THAN WOUND: HCPCS

## 2018-05-09 PROCEDURE — 80061 LIPID PANEL: CPT

## 2018-05-09 PROCEDURE — 97112 NEUROMUSCULAR REEDUCATION: CPT

## 2018-05-09 PROCEDURE — 97110 THERAPEUTIC EXERCISES: CPT

## 2018-05-11 ENCOUNTER — HOSPITAL ENCOUNTER (OUTPATIENT)
Dept: OCCUPATIONAL THERAPY | Age: 78
Setting detail: THERAPIES SERIES
Discharge: HOME | End: 2018-05-11
Payer: MEDICARE

## 2018-05-11 PROCEDURE — 97032 APPL MODALITY 1+ESTIM EA 15: CPT

## 2018-05-11 PROCEDURE — 97110 THERAPEUTIC EXERCISES: CPT

## 2018-05-11 PROCEDURE — 97530 THERAPEUTIC ACTIVITIES: CPT

## 2018-05-11 PROCEDURE — 97140 MANUAL THERAPY 1/> REGIONS: CPT

## 2018-05-14 ENCOUNTER — HOSPITAL ENCOUNTER (OUTPATIENT)
Dept: OCCUPATIONAL THERAPY | Age: 78
Setting detail: THERAPIES SERIES
Discharge: HOME | End: 2018-05-14
Payer: MEDICARE

## 2018-05-14 PROCEDURE — G0283 ELEC STIM OTHER THAN WOUND: HCPCS

## 2018-05-14 PROCEDURE — 97110 THERAPEUTIC EXERCISES: CPT

## 2018-05-14 PROCEDURE — 97112 NEUROMUSCULAR REEDUCATION: CPT

## 2018-05-16 ENCOUNTER — HOSPITAL ENCOUNTER (OUTPATIENT)
Dept: OCCUPATIONAL THERAPY | Age: 78
Setting detail: THERAPIES SERIES
Discharge: HOME | End: 2018-05-16
Payer: MEDICARE

## 2018-05-16 PROCEDURE — 97112 NEUROMUSCULAR REEDUCATION: CPT

## 2018-05-16 PROCEDURE — G0283 ELEC STIM OTHER THAN WOUND: HCPCS

## 2018-05-16 PROCEDURE — 97110 THERAPEUTIC EXERCISES: CPT

## 2018-05-18 ENCOUNTER — HOSPITAL ENCOUNTER (OUTPATIENT)
Dept: OCCUPATIONAL THERAPY | Age: 78
Setting detail: THERAPIES SERIES
Discharge: HOME | End: 2018-05-18
Payer: MEDICARE

## 2018-05-18 PROCEDURE — 97112 NEUROMUSCULAR REEDUCATION: CPT

## 2018-05-18 PROCEDURE — 97110 THERAPEUTIC EXERCISES: CPT

## 2018-05-18 PROCEDURE — G0283 ELEC STIM OTHER THAN WOUND: HCPCS

## 2018-05-21 ENCOUNTER — HOSPITAL ENCOUNTER (OUTPATIENT)
Dept: OCCUPATIONAL THERAPY | Age: 78
Setting detail: THERAPIES SERIES
Discharge: HOME | End: 2018-05-21
Payer: MEDICARE

## 2018-05-21 PROCEDURE — 97112 NEUROMUSCULAR REEDUCATION: CPT

## 2018-05-21 PROCEDURE — G0283 ELEC STIM OTHER THAN WOUND: HCPCS

## 2018-05-21 PROCEDURE — 97110 THERAPEUTIC EXERCISES: CPT

## 2018-05-23 ENCOUNTER — HOSPITAL ENCOUNTER (OUTPATIENT)
Dept: OCCUPATIONAL THERAPY | Age: 78
Setting detail: THERAPIES SERIES
Discharge: HOME | End: 2018-05-23
Payer: MEDICARE

## 2018-05-23 PROCEDURE — 97112 NEUROMUSCULAR REEDUCATION: CPT

## 2018-05-23 PROCEDURE — G0283 ELEC STIM OTHER THAN WOUND: HCPCS

## 2018-05-23 PROCEDURE — 97110 THERAPEUTIC EXERCISES: CPT

## 2018-05-25 ENCOUNTER — HOSPITAL ENCOUNTER (OUTPATIENT)
Dept: OCCUPATIONAL THERAPY | Age: 78
Setting detail: THERAPIES SERIES
Discharge: HOME | End: 2018-05-25
Payer: MEDICARE

## 2018-05-25 PROCEDURE — 97112 NEUROMUSCULAR REEDUCATION: CPT

## 2018-05-25 PROCEDURE — 97110 THERAPEUTIC EXERCISES: CPT

## 2018-05-28 ENCOUNTER — APPOINTMENT (OUTPATIENT)
Dept: OCCUPATIONAL THERAPY | Age: 78
End: 2018-05-28
Payer: MEDICARE

## 2018-05-30 ENCOUNTER — HOSPITAL ENCOUNTER (OUTPATIENT)
Dept: OCCUPATIONAL THERAPY | Age: 78
Setting detail: THERAPIES SERIES
Discharge: HOME | End: 2018-05-30
Payer: MEDICARE

## 2018-05-30 PROCEDURE — G8985 CARRY GOAL STATUS: HCPCS

## 2018-05-30 PROCEDURE — G8984 CARRY CURRENT STATUS: HCPCS

## 2018-05-30 PROCEDURE — 97112 NEUROMUSCULAR REEDUCATION: CPT

## 2018-05-30 PROCEDURE — 97110 THERAPEUTIC EXERCISES: CPT

## 2018-05-30 PROCEDURE — G0283 ELEC STIM OTHER THAN WOUND: HCPCS

## 2018-06-01 ENCOUNTER — APPOINTMENT (OUTPATIENT)
Dept: OCCUPATIONAL THERAPY | Age: 78
End: 2018-06-01
Payer: MEDICARE

## 2018-06-04 ENCOUNTER — APPOINTMENT (OUTPATIENT)
Dept: OCCUPATIONAL THERAPY | Age: 78
End: 2018-06-04
Payer: MEDICARE

## 2018-06-06 ENCOUNTER — APPOINTMENT (OUTPATIENT)
Dept: OCCUPATIONAL THERAPY | Age: 78
End: 2018-06-06
Payer: MEDICARE

## 2018-06-08 ENCOUNTER — HOSPITAL ENCOUNTER (OUTPATIENT)
Dept: OCCUPATIONAL THERAPY | Age: 78
Setting detail: THERAPIES SERIES
Discharge: HOME | End: 2018-06-08
Payer: MEDICARE

## 2018-06-08 PROCEDURE — G0283 ELEC STIM OTHER THAN WOUND: HCPCS

## 2018-06-08 PROCEDURE — 97112 NEUROMUSCULAR REEDUCATION: CPT

## 2018-06-15 ENCOUNTER — HOSPITAL ENCOUNTER (OUTPATIENT)
Dept: OCCUPATIONAL THERAPY | Age: 78
Setting detail: THERAPIES SERIES
Discharge: HOME | End: 2018-06-15
Payer: MEDICARE

## 2018-06-15 PROCEDURE — 97112 NEUROMUSCULAR REEDUCATION: CPT

## 2018-06-15 PROCEDURE — G8985 CARRY GOAL STATUS: HCPCS

## 2018-06-15 PROCEDURE — G0283 ELEC STIM OTHER THAN WOUND: HCPCS

## 2018-06-15 PROCEDURE — G8986 CARRY D/C STATUS: HCPCS

## 2018-06-15 PROCEDURE — 97110 THERAPEUTIC EXERCISES: CPT

## 2018-06-15 PROCEDURE — G8984 CARRY CURRENT STATUS: HCPCS

## 2018-07-18 ENCOUNTER — OFFICE VISIT (OUTPATIENT)
Dept: NEUROLOGY | Age: 78
End: 2018-07-18
Payer: MEDICARE

## 2018-07-18 VITALS
WEIGHT: 147.6 LBS | SYSTOLIC BLOOD PRESSURE: 161 MMHG | DIASTOLIC BLOOD PRESSURE: 79 MMHG | HEART RATE: 87 BPM | HEIGHT: 65 IN | BODY MASS INDEX: 24.59 KG/M2

## 2018-07-18 DIAGNOSIS — G62.9 NEUROPATHY: ICD-10-CM

## 2018-07-18 DIAGNOSIS — I67.9 CEREBRAL VASCULAR DISEASE: Primary | ICD-10-CM

## 2018-07-18 DIAGNOSIS — R29.898 LUE WEAKNESS: ICD-10-CM

## 2018-07-18 PROCEDURE — 1036F TOBACCO NON-USER: CPT | Performed by: NURSE PRACTITIONER

## 2018-07-18 PROCEDURE — G8400 PT W/DXA NO RESULTS DOC: HCPCS | Performed by: NURSE PRACTITIONER

## 2018-07-18 PROCEDURE — 1101F PT FALLS ASSESS-DOCD LE1/YR: CPT | Performed by: NURSE PRACTITIONER

## 2018-07-18 PROCEDURE — 4040F PNEUMOC VAC/ADMIN/RCVD: CPT | Performed by: NURSE PRACTITIONER

## 2018-07-18 PROCEDURE — G8420 CALC BMI NORM PARAMETERS: HCPCS | Performed by: NURSE PRACTITIONER

## 2018-07-18 PROCEDURE — G8598 ASA/ANTIPLAT THER USED: HCPCS | Performed by: NURSE PRACTITIONER

## 2018-07-18 PROCEDURE — 1123F ACP DISCUSS/DSCN MKR DOCD: CPT | Performed by: NURSE PRACTITIONER

## 2018-07-18 PROCEDURE — 99214 OFFICE O/P EST MOD 30 MIN: CPT | Performed by: NURSE PRACTITIONER

## 2018-07-18 PROCEDURE — 1090F PRES/ABSN URINE INCON ASSESS: CPT | Performed by: NURSE PRACTITIONER

## 2018-07-18 PROCEDURE — G8427 DOCREV CUR MEDS BY ELIG CLIN: HCPCS | Performed by: NURSE PRACTITIONER

## 2018-07-18 NOTE — PROGRESS NOTES
tone: normal  Left arm tone: normal  Right arm pronator drift: absent  Left arm pronator drift: absent  Right leg tone: normal  Left leg tone: normal    Strength   Strength 5/5 except as noted. Left interossei: 3/5    Sensory Exam   Right leg light touch: decreased from ankle  Left leg light touch: decreased from ankle  Right leg vibration: decreased from ankle  Left leg vibration: decreased from ankle  Right leg pinprick: decreased from ankle  Left leg pinprick: decreased from ankle    Gait, Coordination, and Reflexes     Gait  Gait: normal    Coordination   Finger to nose coordination: normal    Tremor   Resting tremor: absent  Intention tremor: absent    Reflexes   Right brachioradialis: 2+  Left brachioradialis: 2+  Right biceps: 2+  Left biceps: 2+  Right triceps: 2+  Left triceps: 2+  Right patellar: 0  Left patellar: 0  Right achilles: 0  Left achilles: 0            ASSESSMENT/PLAN:       In summary, your patient, Toño Boone exhibits the following, with associated plan:    1. 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 1. Continue aspirin 81 mg daily  2. Continue atorvastatin 40 mg daily  3. Continued daily exercises of her left arm  4. Return for reevaluation in 6 months  2. Probable peripheral neuropathy with diminished sensation in her feet in a glove stocking distribution. This may be secondary to a low normal vitamin B12 level. 1. Continue oral vitamin B12 at 1000 µg sublingually  2. An EMG/NCV was advised to delineate whether this neuropathy is secondary to a low vitamin B12 versus lumbar spine etiology and whether we should pursue any other metabolic studies.   The patient and her  will discuss this and contact the office if she wants to proceed with the studies            Signed: Jhonatan Carcamo, CNP      *Please note that portions of this note were completed with a voice recognition program.  Although every effort was made to insure the accuracy of this automated transcription, some errors in transcription may have occurred, occasionally words and are mis-transcribed

## 2018-11-12 ENCOUNTER — HOSPITAL ENCOUNTER (OUTPATIENT)
Dept: LAB | Age: 78
Discharge: HOME | End: 2018-11-12
Payer: MEDICARE

## 2018-11-12 DIAGNOSIS — E78.5 HYPERLIPIDEMIA, UNSPECIFIED HYPERLIPIDEMIA TYPE: ICD-10-CM

## 2018-11-12 LAB
CHOLESTEROL, FASTING: 104 MG/DL
CHOLESTEROL/HDL RATIO: 2.1
HDLC SERPL-MCNC: 49 MG/DL
LDL CHOLESTEROL: 31 MG/DL (ref 0–130)
TRIGLYCERIDE, FASTING: 118 MG/DL
VLDLC SERPL CALC-MCNC: NORMAL MG/DL (ref 1–30)

## 2018-11-12 PROCEDURE — 36415 COLL VENOUS BLD VENIPUNCTURE: CPT

## 2018-11-12 PROCEDURE — 80061 LIPID PANEL: CPT

## 2019-07-30 ENCOUNTER — HOSPITAL ENCOUNTER (OUTPATIENT)
Dept: LAB | Age: 79
Discharge: HOME | End: 2019-07-30
Payer: MEDICARE

## 2019-07-30 DIAGNOSIS — E78.00 PURE HYPERCHOLESTEROLEMIA: ICD-10-CM

## 2019-07-30 DIAGNOSIS — I10 ESSENTIAL HYPERTENSION: ICD-10-CM

## 2019-07-30 DIAGNOSIS — Z23 NEED FOR PNEUMOCOCCAL VACCINATION: ICD-10-CM

## 2019-07-30 LAB
ALT SERPL-CCNC: 8 U/L (ref 5–33)
ANION GAP SERPL CALCULATED.3IONS-SCNC: 14 MMOL/L (ref 9–17)
AST SERPL-CCNC: 11 U/L
BUN BLDV-MCNC: 18 MG/DL (ref 8–23)
BUN/CREAT BLD: 14 (ref 9–20)
CALCIUM SERPL-MCNC: 9.6 MG/DL (ref 8.6–10.4)
CHLORIDE BLD-SCNC: 93 MMOL/L (ref 98–107)
CO2: 25 MMOL/L (ref 20–31)
CREAT SERPL-MCNC: 1.29 MG/DL (ref 0.5–0.9)
GFR AFRICAN AMERICAN: 49 ML/MIN
GFR NON-AFRICAN AMERICAN: 40 ML/MIN
GFR SERPL CREATININE-BSD FRML MDRD: ABNORMAL ML/MIN/{1.73_M2}
GFR SERPL CREATININE-BSD FRML MDRD: ABNORMAL ML/MIN/{1.73_M2}
GLUCOSE BLD-MCNC: 151 MG/DL (ref 70–99)
POTASSIUM SERPL-SCNC: 4.6 MMOL/L (ref 3.7–5.3)
SODIUM BLD-SCNC: 132 MMOL/L (ref 135–144)

## 2019-07-30 PROCEDURE — 36415 COLL VENOUS BLD VENIPUNCTURE: CPT

## 2019-07-30 PROCEDURE — 80048 BASIC METABOLIC PNL TOTAL CA: CPT

## 2019-07-30 PROCEDURE — 84450 TRANSFERASE (AST) (SGOT): CPT

## 2019-07-30 PROCEDURE — 84460 ALANINE AMINO (ALT) (SGPT): CPT

## 2019-08-07 ENCOUNTER — HOSPITAL ENCOUNTER (OUTPATIENT)
Dept: LAB | Age: 79
Discharge: HOME | End: 2019-08-07
Payer: MEDICARE

## 2019-08-07 DIAGNOSIS — I10 ESSENTIAL HYPERTENSION: ICD-10-CM

## 2019-08-07 LAB
ANION GAP SERPL CALCULATED.3IONS-SCNC: 11 MMOL/L (ref 9–17)
BUN BLDV-MCNC: 15 MG/DL (ref 8–23)
BUN/CREAT BLD: 12 (ref 9–20)
CALCIUM SERPL-MCNC: 9.5 MG/DL (ref 8.6–10.4)
CHLORIDE BLD-SCNC: 101 MMOL/L (ref 98–107)
CO2: 23 MMOL/L (ref 20–31)
CREAT SERPL-MCNC: 1.26 MG/DL (ref 0.5–0.9)
GFR AFRICAN AMERICAN: 50 ML/MIN
GFR NON-AFRICAN AMERICAN: 41 ML/MIN
GFR SERPL CREATININE-BSD FRML MDRD: ABNORMAL ML/MIN/{1.73_M2}
GFR SERPL CREATININE-BSD FRML MDRD: ABNORMAL ML/MIN/{1.73_M2}
GLUCOSE BLD-MCNC: 139 MG/DL (ref 70–99)
POTASSIUM SERPL-SCNC: 4.3 MMOL/L (ref 3.7–5.3)
SODIUM BLD-SCNC: 135 MMOL/L (ref 135–144)

## 2019-08-07 PROCEDURE — 80048 BASIC METABOLIC PNL TOTAL CA: CPT

## 2019-08-07 PROCEDURE — 36415 COLL VENOUS BLD VENIPUNCTURE: CPT

## 2019-10-11 ENCOUNTER — HOSPITAL ENCOUNTER (OUTPATIENT)
Dept: LAB | Age: 79
Discharge: HOME | End: 2019-10-11
Payer: MEDICARE

## 2019-10-11 DIAGNOSIS — N28.9 DECREASED RENAL FUNCTION: ICD-10-CM

## 2019-10-11 LAB
ANION GAP SERPL CALCULATED.3IONS-SCNC: 13 MMOL/L (ref 9–17)
BUN BLDV-MCNC: 12 MG/DL (ref 8–23)
BUN/CREAT BLD: 12 (ref 9–20)
CALCIUM SERPL-MCNC: 9.4 MG/DL (ref 8.6–10.4)
CHLORIDE BLD-SCNC: 100 MMOL/L (ref 98–107)
CO2: 23 MMOL/L (ref 20–31)
CREAT SERPL-MCNC: 1.02 MG/DL (ref 0.5–0.9)
GFR AFRICAN AMERICAN: >60 ML/MIN
GFR NON-AFRICAN AMERICAN: 52 ML/MIN
GFR SERPL CREATININE-BSD FRML MDRD: ABNORMAL ML/MIN/{1.73_M2}
GFR SERPL CREATININE-BSD FRML MDRD: ABNORMAL ML/MIN/{1.73_M2}
GLUCOSE BLD-MCNC: 154 MG/DL (ref 70–99)
POTASSIUM SERPL-SCNC: 4 MMOL/L (ref 3.7–5.3)
SODIUM BLD-SCNC: 136 MMOL/L (ref 135–144)

## 2019-10-11 PROCEDURE — 36415 COLL VENOUS BLD VENIPUNCTURE: CPT

## 2019-10-11 PROCEDURE — 80048 BASIC METABOLIC PNL TOTAL CA: CPT

## 2020-01-29 ENCOUNTER — HOSPITAL ENCOUNTER (OUTPATIENT)
Dept: LAB | Age: 80
Discharge: HOME | End: 2020-01-29
Payer: MEDICARE

## 2020-01-29 LAB
CHOLESTEROL, FASTING: 88 MG/DL
CHOLESTEROL/HDL RATIO: 2
HDLC SERPL-MCNC: 43 MG/DL
LDL CHOLESTEROL: 31 MG/DL (ref 0–130)
TRIGLYCERIDE, FASTING: 69 MG/DL
VLDLC SERPL CALC-MCNC: NORMAL MG/DL (ref 1–30)

## 2020-01-29 PROCEDURE — 36415 COLL VENOUS BLD VENIPUNCTURE: CPT

## 2020-01-29 PROCEDURE — 80061 LIPID PANEL: CPT

## 2020-10-16 ENCOUNTER — HOSPITAL ENCOUNTER (OUTPATIENT)
Dept: LAB | Age: 80
Discharge: HOME | End: 2020-10-16
Payer: MEDICARE

## 2020-10-16 LAB
ALBUMIN SERPL-MCNC: 2.7 G/DL (ref 3.5–5.2)
ALBUMIN/GLOBULIN RATIO: 0.5 (ref 1–2.5)
ALP BLD-CCNC: 76 U/L (ref 35–104)
ALT SERPL-CCNC: 9 U/L (ref 5–33)
ANION GAP SERPL CALCULATED.3IONS-SCNC: 11 MMOL/L (ref 9–17)
AST SERPL-CCNC: 11 U/L
BILIRUB SERPL-MCNC: 0.43 MG/DL (ref 0.3–1.2)
BUN BLDV-MCNC: 18 MG/DL (ref 8–23)
BUN/CREAT BLD: 20 (ref 9–20)
CALCIUM SERPL-MCNC: 8.8 MG/DL (ref 8.6–10.4)
CHLORIDE BLD-SCNC: 96 MMOL/L (ref 98–107)
CHOLESTEROL/HDL RATIO: 2.3
CHOLESTEROL: 64 MG/DL
CO2: 21 MMOL/L (ref 20–31)
CREAT SERPL-MCNC: 0.92 MG/DL (ref 0.5–0.9)
ESTIMATED AVERAGE GLUCOSE: 131 MG/DL
FERRITIN: 925 UG/L (ref 13–150)
FOLATE: 6 NG/ML
GFR AFRICAN AMERICAN: >60 ML/MIN
GFR NON-AFRICAN AMERICAN: 59 ML/MIN
GFR SERPL CREATININE-BSD FRML MDRD: ABNORMAL ML/MIN/{1.73_M2}
GFR SERPL CREATININE-BSD FRML MDRD: ABNORMAL ML/MIN/{1.73_M2}
GLUCOSE BLD-MCNC: 110 MG/DL (ref 70–99)
HBA1C MFR BLD: 6.2 % (ref 4–6)
HCT VFR BLD CALC: 25.8 % (ref 36.3–47.1)
HDLC SERPL-MCNC: 28 MG/DL
HEMOGLOBIN: 7.2 G/DL (ref 11.9–15.1)
IRON SATURATION: 9 % (ref 20–55)
IRON: 15 UG/DL (ref 37–145)
LDL CHOLESTEROL: 27 MG/DL (ref 0–130)
MCH RBC QN AUTO: 22.5 PG (ref 25.2–33.5)
MCHC RBC AUTO-ENTMCNC: 27.9 G/DL (ref 28.4–34.8)
MCV RBC AUTO: 80.6 FL (ref 82.6–102.9)
NRBC AUTOMATED: 0 PER 100 WBC
PDW BLD-RTO: 16.1 % (ref 11.8–14.4)
PLATELET # BLD: 433 K/UL (ref 138–453)
PMV BLD AUTO: 9.6 FL (ref 8.1–13.5)
POTASSIUM SERPL-SCNC: 4.4 MMOL/L (ref 3.7–5.3)
RBC # BLD: 3.2 M/UL (ref 3.95–5.11)
SODIUM BLD-SCNC: 128 MMOL/L (ref 135–144)
TOTAL IRON BINDING CAPACITY: 165 UG/DL (ref 250–450)
TOTAL PROTEIN: 7.9 G/DL (ref 6.4–8.3)
TRIGL SERPL-MCNC: 47 MG/DL
TSH SERPL DL<=0.05 MIU/L-ACNC: 0.82 MIU/L (ref 0.3–5)
UNSATURATED IRON BINDING CAPACITY: 150 UG/DL (ref 112–347)
VITAMIN B-12: 1928 PG/ML (ref 232–1245)
VITAMIN D 25-HYDROXY: 22.1 NG/ML (ref 30–100)
VLDLC SERPL CALC-MCNC: ABNORMAL MG/DL (ref 1–30)
WBC # BLD: 7.3 K/UL (ref 3.5–11.3)

## 2020-10-16 PROCEDURE — 83550 IRON BINDING TEST: CPT

## 2020-10-16 PROCEDURE — 80061 LIPID PANEL: CPT

## 2020-10-16 PROCEDURE — 83036 HEMOGLOBIN GLYCOSYLATED A1C: CPT

## 2020-10-16 PROCEDURE — 82306 VITAMIN D 25 HYDROXY: CPT

## 2020-10-16 PROCEDURE — 82728 ASSAY OF FERRITIN: CPT

## 2020-10-16 PROCEDURE — 36415 COLL VENOUS BLD VENIPUNCTURE: CPT

## 2020-10-16 PROCEDURE — 80053 COMPREHEN METABOLIC PANEL: CPT

## 2020-10-16 PROCEDURE — 83540 ASSAY OF IRON: CPT

## 2020-10-16 PROCEDURE — 82746 ASSAY OF FOLIC ACID SERUM: CPT

## 2020-10-16 PROCEDURE — 84443 ASSAY THYROID STIM HORMONE: CPT

## 2020-10-16 PROCEDURE — 85027 COMPLETE CBC AUTOMATED: CPT

## 2020-10-16 PROCEDURE — 82607 VITAMIN B-12: CPT

## 2020-10-26 ENCOUNTER — HOSPITAL ENCOUNTER (OUTPATIENT)
Dept: LAB | Age: 80
Setting detail: SPECIMEN
Discharge: HOME | End: 2020-10-26
Payer: MEDICARE

## 2020-10-26 LAB
DATE, STOOL #1: NORMAL
DATE, STOOL #2: NORMAL
DATE, STOOL #3: NORMAL
HEMOCCULT SP1 STL QL: NEGATIVE
HEMOCCULT SP2 STL QL: NORMAL
HEMOCCULT SP3 STL QL: NORMAL
TIME, STOOL #1: 2000
TIME, STOOL #2: NORMAL
TIME, STOOL #3: NORMAL

## 2020-10-26 PROCEDURE — 82272 OCCULT BLD FECES 1-3 TESTS: CPT

## 2020-10-28 ENCOUNTER — OFFICE VISIT (OUTPATIENT)
Dept: ONCOLOGY | Age: 80
End: 2020-10-28
Payer: MEDICARE

## 2020-10-28 VITALS
SYSTOLIC BLOOD PRESSURE: 161 MMHG | HEART RATE: 92 BPM | TEMPERATURE: 95.8 F | WEIGHT: 118 LBS | DIASTOLIC BLOOD PRESSURE: 71 MMHG | HEIGHT: 64 IN | BODY MASS INDEX: 20.14 KG/M2 | RESPIRATION RATE: 18 BRPM

## 2020-10-28 PROCEDURE — 99205 OFFICE O/P NEW HI 60 MIN: CPT | Performed by: INTERNAL MEDICINE

## 2020-10-28 PROCEDURE — 99211 OFF/OP EST MAY X REQ PHY/QHP: CPT

## 2020-10-28 PROCEDURE — 1090F PRES/ABSN URINE INCON ASSESS: CPT | Performed by: INTERNAL MEDICINE

## 2020-10-28 PROCEDURE — G8427 DOCREV CUR MEDS BY ELIG CLIN: HCPCS | Performed by: INTERNAL MEDICINE

## 2020-10-28 PROCEDURE — G8420 CALC BMI NORM PARAMETERS: HCPCS | Performed by: INTERNAL MEDICINE

## 2020-10-28 PROCEDURE — G8484 FLU IMMUNIZE NO ADMIN: HCPCS | Performed by: INTERNAL MEDICINE

## 2020-10-28 NOTE — PROGRESS NOTES
_               Ms. Maribel Larry is a very pleasant 79 y.o. female with history of multiple co morbidities as listed. The patient is referred for iron deficiency anemia. Patient was doing very well over very long duration with no underlying medical problems. She had stroke in February 2018 with left-sided weakness. She has significant improvement in her power and function since then. She was maintained on aspirin and vitamins since that time. The patient's last evaluation with her PCP showed severe anemia. Further work-up showed very low iron level with elevated ferritin. Patient does not have any major symptoms. She has increasing weakness and fatigue since January of this year. She has no headaches or dizziness. No palpitation. No shortness of breath. Patient denies any active bleeding. No melena or hematochezia. No hematemesis. No vaginal bleeding. No hematuria. She had stool guaiac test which was negative. She was started on oral iron on October 16 and it is tolerated fairly well. Mild constipation. No other side effects. Patient denies smoking or alcohol drinking. Lola Arenas PAST MEDICAL HISTORY: has a past medical history of B12 deficiency, CVA (cerebral vascular accident) ([address] CVD (cerebrovascular disease), and Hypertension. PAST SURGICAL HISTORY: has no past surgical history on file. CURRENT MEDICATIONS:  has a current medication list which includes the following prescription(s): vitamin d3, ferrous sulfate, vitamin b 12, mirtazapine, atorvastatin, lisinopril, and aspirin. ALLERGIES:  has No Known Allergies. FAMILY HISTORY: Negative for any hematological or oncological conditions. SOCIAL HISTORY:  reports that she has never smoked. She has never used smokeless tobacco. She reports that she does not drink alcohol or use drugs.     REVIEW OF SYSTEMS:     · General: Positive for polyps  Mouth - mucous membranes moist, pharynx normal without lesions  Neck - supple, no significant adenopathy  Lymphatics - no palpable lymphadenopathy, no hepatosplenomegaly  Chest - clear to auscultation, no wheezes, rales or rhonchi, symmetric air entry  Heart - normal rate, regular rhythm, normal S1, S2, no murmurs, rubs, clicks or gallops  Abdomen - soft, nontender, nondistended, no masses or organomegaly  Neurological - alert, oriented, normal speech, no focal findings or movement disorder noted  Musculoskeletal - no joint tenderness, deformity or swelling  Extremities - peripheral pulses normal, no pedal edema, no clubbing or cyanosis  Skin - normal coloration and turgor, no rashes, no suspicious skin lesions noted     Review of Diagnostic data:   Lab Results   Component Value Date    WBC 7.3 10/16/2020    HGB 7.2 (L) 10/16/2020    HCT 25.8 (L) 10/16/2020    MCV 80.6 (L) 10/16/2020     10/16/2020       Chemistry        Component Value Date/Time     (L) 10/16/2020 1111    K 4.4 10/16/2020 1111    CL 96 (L) 10/16/2020 1111    CO2 21 10/16/2020 1111    BUN 18 10/16/2020 1111    CREATININE 0.92 (H) 10/16/2020 1111        Component Value Date/Time    CALCIUM 8.8 10/16/2020 1111    ALKPHOS 76 10/16/2020 1111    AST 11 10/16/2020 1111    ALT 9 10/16/2020 1111    BILITOT 0.43 10/16/2020 1111        Lab Results   Component Value Date    IRON 15 (L) 10/16/2020    TIBC 165 (L) 10/16/2020    FERRITIN 925 (H) 10/16/2020         IMPRESSION:   Microcytic anemia  Iron deficiency  Elevated ferritin  History of CVA with left-sided weakness. Resolving. PLAN: I reviewed the labs available to me and discussed with the patient. For more than 60 minutes of face to face discussion, I explained to the patient the nature of this hematologic problem. I explained the significance of these abnormalities in layman language.    Reviewing patient's labs obviously she had absolutely normal CBC in 3 occasions in February 2018.  She had hemoglobin around 14 at that time. Lab test from October 16 showed severe anemia with hemoglobin down to 7.2 but at the same time MCV showed only slight drop compared to her baseline. .  The labs showed elevated ferritin with low serum iron and saturation. She had elevated vitamin Q57 and normal folic acid. Chemistry tests were nonsignificant except for a mild hyponatremia and slight elevated creatinine. So by careful analysis of these lab values we would expect severe microcytosis with this degree of iron deficiency anemia if this is pure iron deficiency anemia. I suspect we could be having anemia related to multiple factors. I will investigate this further. In the interim I instructed the patient to continue on iron replacement twice daily. If she develops any side effects or if she has no response will consider IV iron infusion. I explained that iron deficiency anemia will need further work-up. I do not believe this is caused by malabsorption. In her condition this could be related to GI blood loss. Possibly upper GI as a result of the use of the aspirin. In any case we will evaluate the labs in 2 to 3 weeks and we will consider referral to GI for full GI work-up and we will decide about further treatment according to lab results. Patient's questions were answered to the best of her satisfaction and she verbalized full understanding and agreement.

## 2020-10-31 PROBLEM — D50.9 MICROCYTIC ANEMIA: Status: ACTIVE | Noted: 2020-10-31

## 2020-10-31 PROBLEM — R79.89 ELEVATED FERRITIN: Status: ACTIVE | Noted: 2020-10-31

## 2020-11-17 ENCOUNTER — HOSPITAL ENCOUNTER (OUTPATIENT)
Dept: LAB | Age: 80
Discharge: HOME | End: 2020-11-17
Payer: MEDICARE

## 2020-11-17 LAB
ABSOLUTE EOS #: 0 K/UL (ref 0–0.44)
ABSOLUTE IMMATURE GRANULOCYTE: 0.05 K/UL (ref 0–0.3)
ABSOLUTE LYMPH #: 1.28 K/UL (ref 1.1–3.7)
ABSOLUTE MONO #: 0.31 K/UL (ref 0.1–1.2)
ABSOLUTE RETIC #: 0.02 M/UL (ref 0.03–0.08)
ALBUMIN SERPL-MCNC: 2.7 G/DL (ref 3.5–5.2)
ALBUMIN/GLOBULIN RATIO: 0.5 (ref 1–2.5)
ALP BLD-CCNC: 109 U/L (ref 35–104)
ALT SERPL-CCNC: 20 U/L (ref 5–33)
ANION GAP SERPL CALCULATED.3IONS-SCNC: 12 MMOL/L (ref 9–17)
AST SERPL-CCNC: 26 U/L
BASOPHILS # BLD: 0 % (ref 0–2)
BASOPHILS ABSOLUTE: 0 K/UL (ref 0–0.2)
BILIRUB SERPL-MCNC: 0.44 MG/DL (ref 0.3–1.2)
BUN BLDV-MCNC: 20 MG/DL (ref 8–23)
BUN/CREAT BLD: 24 (ref 9–20)
CALCIUM SERPL-MCNC: 8.9 MG/DL (ref 8.6–10.4)
CARCINOEMBRYONIC ANTIGEN: 2.7 NG/ML
CHLORIDE BLD-SCNC: 95 MMOL/L (ref 98–107)
CO2: 21 MMOL/L (ref 20–31)
CREAT SERPL-MCNC: 0.84 MG/DL (ref 0.5–0.9)
DIFFERENTIAL TYPE: ABNORMAL
EOSINOPHILS RELATIVE PERCENT: 0 % (ref 1–4)
FERRITIN: 1139 UG/L (ref 13–150)
FOLATE: 6.8 NG/ML
GFR AFRICAN AMERICAN: >60 ML/MIN
GFR NON-AFRICAN AMERICAN: >60 ML/MIN
GFR SERPL CREATININE-BSD FRML MDRD: ABNORMAL ML/MIN/{1.73_M2}
GFR SERPL CREATININE-BSD FRML MDRD: ABNORMAL ML/MIN/{1.73_M2}
GLUCOSE BLD-MCNC: 143 MG/DL (ref 70–99)
HAPTOGLOBIN: 605 MG/DL (ref 30–200)
HCT VFR BLD CALC: 26.7 % (ref 36.3–47.1)
HEMOGLOBIN: 7.5 G/DL (ref 11.9–15.1)
IMMATURE GRANULOCYTES: 1 %
IMMATURE RETIC FRACT: 8.2 % (ref 2.7–18.3)
IRON SATURATION: 12 % (ref 20–55)
IRON: 18 UG/DL (ref 37–145)
LYMPHOCYTES # BLD: 25 % (ref 24–43)
MCH RBC QN AUTO: 22.2 PG (ref 25.2–33.5)
MCHC RBC AUTO-ENTMCNC: 28.1 G/DL (ref 28.4–34.8)
MCV RBC AUTO: 79 FL (ref 82.6–102.9)
MONOCYTES # BLD: 6 % (ref 3–12)
MORPHOLOGY: ABNORMAL
NRBC AUTOMATED: 0 PER 100 WBC
PDW BLD-RTO: 16.4 % (ref 11.8–14.4)
PLATELET # BLD: 332 K/UL (ref 138–453)
PLATELET ESTIMATE: ABNORMAL
PMV BLD AUTO: 10.6 FL (ref 8.1–13.5)
POTASSIUM SERPL-SCNC: 3.9 MMOL/L (ref 3.7–5.3)
RBC # BLD: 3.38 M/UL (ref 3.95–5.11)
RBC # BLD: ABNORMAL 10*6/UL
RETIC %: 0.6 % (ref 0.5–1.9)
RETIC HEMOGLOBIN: 24 PG (ref 28.2–35.7)
SEDIMENTATION RATE, ERYTHROCYTE: >140 MM (ref 0–20)
SEG NEUTROPHILS: 68 % (ref 36–65)
SEGMENTED NEUTROPHILS ABSOLUTE COUNT: 3.46 K/UL (ref 1.5–8.1)
SODIUM BLD-SCNC: 128 MMOL/L (ref 135–144)
TOTAL IRON BINDING CAPACITY: 155 UG/DL (ref 250–450)
TOTAL PROTEIN: 8.5 G/DL (ref 6.4–8.3)
UNSATURATED IRON BINDING CAPACITY: 137 UG/DL (ref 112–347)
VITAMIN B-12: >2000 PG/ML (ref 232–1245)
WBC # BLD: 5.1 K/UL (ref 3.5–11.3)
WBC # BLD: ABNORMAL 10*3/UL

## 2020-11-17 PROCEDURE — 85045 AUTOMATED RETICULOCYTE COUNT: CPT

## 2020-11-17 PROCEDURE — 82607 VITAMIN B-12: CPT

## 2020-11-17 PROCEDURE — 82378 CARCINOEMBRYONIC ANTIGEN: CPT

## 2020-11-17 PROCEDURE — 85025 COMPLETE CBC W/AUTO DIFF WBC: CPT

## 2020-11-17 PROCEDURE — 83010 ASSAY OF HAPTOGLOBIN QUANT: CPT

## 2020-11-17 PROCEDURE — 82728 ASSAY OF FERRITIN: CPT

## 2020-11-17 PROCEDURE — 85651 RBC SED RATE NONAUTOMATED: CPT

## 2020-11-17 PROCEDURE — 83550 IRON BINDING TEST: CPT

## 2020-11-17 PROCEDURE — 86334 IMMUNOFIX E-PHORESIS SERUM: CPT

## 2020-11-17 PROCEDURE — 80053 COMPREHEN METABOLIC PANEL: CPT

## 2020-11-17 PROCEDURE — 83540 ASSAY OF IRON: CPT

## 2020-11-17 PROCEDURE — 36415 COLL VENOUS BLD VENIPUNCTURE: CPT

## 2020-11-17 PROCEDURE — 81256 HFE GENE: CPT

## 2020-11-17 PROCEDURE — 82746 ASSAY OF FOLIC ACID SERUM: CPT

## 2020-11-18 LAB
PATHOLOGIST: NORMAL
SERUM IFX INTERP: NORMAL

## 2020-11-22 LAB
C282Y HEMOCHROMATOSIS MUT: NEGATIVE
H63D HEMOCHROMATOSIS MUT: NORMAL
HEMOCHROMATOSIS MUTATION INT: NORMAL
HEMOCHROMATOSIS SPECIMEN: NORMAL
S65C HEMOCHROMATOSIS MUT: NEGATIVE

## 2020-11-25 ENCOUNTER — OFFICE VISIT (OUTPATIENT)
Dept: ONCOLOGY | Age: 80
End: 2020-11-25
Payer: MEDICARE

## 2020-11-25 ENCOUNTER — OFFICE VISIT (OUTPATIENT)
Dept: SURGERY | Age: 80
End: 2020-11-25
Payer: MEDICARE

## 2020-11-25 VITALS
RESPIRATION RATE: 18 BRPM | SYSTOLIC BLOOD PRESSURE: 124 MMHG | TEMPERATURE: 97 F | WEIGHT: 113.9 LBS | DIASTOLIC BLOOD PRESSURE: 61 MMHG | HEART RATE: 71 BPM | HEIGHT: 67 IN | BODY MASS INDEX: 17.88 KG/M2

## 2020-11-25 VITALS
WEIGHT: 114.3 LBS | HEIGHT: 67 IN | BODY MASS INDEX: 17.94 KG/M2 | TEMPERATURE: 98.6 F | HEART RATE: 72 BPM | SYSTOLIC BLOOD PRESSURE: 149 MMHG | RESPIRATION RATE: 16 BRPM | DIASTOLIC BLOOD PRESSURE: 63 MMHG

## 2020-11-25 PROBLEM — N18.30 ANEMIA OF CHRONIC RENAL FAILURE, STAGE 3 (MODERATE) (HCC): Status: ACTIVE | Noted: 2020-11-25

## 2020-11-25 PROBLEM — D63.1 ANEMIA OF CHRONIC RENAL FAILURE, STAGE 3 (MODERATE) (HCC): Status: ACTIVE | Noted: 2020-11-25

## 2020-11-25 PROCEDURE — G8427 DOCREV CUR MEDS BY ELIG CLIN: HCPCS | Performed by: SURGERY

## 2020-11-25 PROCEDURE — 99203 OFFICE O/P NEW LOW 30 MIN: CPT | Performed by: SURGERY

## 2020-11-25 PROCEDURE — G8400 PT W/DXA NO RESULTS DOC: HCPCS | Performed by: INTERNAL MEDICINE

## 2020-11-25 PROCEDURE — 1036F TOBACCO NON-USER: CPT | Performed by: SURGERY

## 2020-11-25 PROCEDURE — G8419 CALC BMI OUT NRM PARAM NOF/U: HCPCS | Performed by: SURGERY

## 2020-11-25 PROCEDURE — 4040F PNEUMOC VAC/ADMIN/RCVD: CPT | Performed by: INTERNAL MEDICINE

## 2020-11-25 PROCEDURE — 1123F ACP DISCUSS/DSCN MKR DOCD: CPT | Performed by: SURGERY

## 2020-11-25 PROCEDURE — G8484 FLU IMMUNIZE NO ADMIN: HCPCS | Performed by: INTERNAL MEDICINE

## 2020-11-25 PROCEDURE — 99211 OFF/OP EST MAY X REQ PHY/QHP: CPT

## 2020-11-25 PROCEDURE — 1036F TOBACCO NON-USER: CPT | Performed by: INTERNAL MEDICINE

## 2020-11-25 PROCEDURE — G8400 PT W/DXA NO RESULTS DOC: HCPCS | Performed by: SURGERY

## 2020-11-25 PROCEDURE — 1123F ACP DISCUSS/DSCN MKR DOCD: CPT | Performed by: INTERNAL MEDICINE

## 2020-11-25 PROCEDURE — G8427 DOCREV CUR MEDS BY ELIG CLIN: HCPCS | Performed by: INTERNAL MEDICINE

## 2020-11-25 PROCEDURE — 1090F PRES/ABSN URINE INCON ASSESS: CPT | Performed by: INTERNAL MEDICINE

## 2020-11-25 PROCEDURE — G8419 CALC BMI OUT NRM PARAM NOF/U: HCPCS | Performed by: INTERNAL MEDICINE

## 2020-11-25 PROCEDURE — 4040F PNEUMOC VAC/ADMIN/RCVD: CPT | Performed by: SURGERY

## 2020-11-25 PROCEDURE — 99211 OFF/OP EST MAY X REQ PHY/QHP: CPT | Performed by: SURGERY

## 2020-11-25 PROCEDURE — G8484 FLU IMMUNIZE NO ADMIN: HCPCS | Performed by: SURGERY

## 2020-11-25 PROCEDURE — 1090F PRES/ABSN URINE INCON ASSESS: CPT | Performed by: SURGERY

## 2020-11-25 PROCEDURE — 99214 OFFICE O/P EST MOD 30 MIN: CPT | Performed by: INTERNAL MEDICINE

## 2020-11-25 NOTE — PATIENT INSTRUCTIONS
Start Aranesp q 2 weeks  Labs with soluble transferrin receptor and NGS for MDS  RV about 4 weeks with Dr Rich Nicole

## 2020-11-25 NOTE — PROGRESS NOTES
GENERAL SURGERY CONSULTATION / OFFICE VISIT      Patient's Name/ Date of Birth/ Gender: Tamea Kawasaki / 1940 (76 y.o.) / female     PCP: STEVE Hammonds CNP    History of present Illness:  Patient is a pleasant 79 y.o. female  kindly referred by STEVE Hammonds CNP  Patient has never had a colonoscopy. Here with . She states she does not want to do a colonoscopy. She has had weight loss. No abdominal pain. Denies melena or hematochezia. Denies family history of colorectal cancer. Poor appetite. Denies obstructive symptoms/ denies nausea/vomiting. History of stroke.  states he is taking her later today to hematology specialist for appt later today. See below for recs. No previous abdominal surgeries. Past Medical History:  has a past medical history of B12 deficiency, CVA (cerebral vascular accident) ([facility] [address] CVD (cerebrovascular disease), and Hypertension. Past Surgical History: History reviewed. No pertinent surgical history. Social History:  reports that she has never smoked. She has never used smokeless tobacco. She reports that she does not drink alcohol or use drugs. Family History: family history includes Cancer in her father and paternal grandfather; Coronary Art Dis in her maternal cousin. Review of Systems:   General: Denies fever, chills, night sweats, weight loss, malaise, fatigue  HEENT: Denies sore throat, sinus problems, allergic rhinosinusitis  Card: Denies chest pain, palpitations, orthopnea/PND. HTN. Pulm: Denies cough, shortness of breath, dyspnea on exertion  GI:  per HPI. : Denies polyuria, dysuria, hematuria  Endo: neg  Heme: neg  Psych: neg  Neuro: hx of stroke  Skin: Denies rashes, ulcers  Musculoskeletal: no gross motor dysfunction.  OA    Allergies: Mirtazapine    Current Meds:  Current Outpatient Medications:     pantoprazole (PROTONIX) 40 MG tablet, Take 1 tablet by mouth every morning (before breakfast), Disp: 90 tablet, Rfl: 0    hydrOXYzine (ATARAX) 25 MG tablet, Take 1 tablet by mouth nightly, Disp: 30 tablet, Rfl: 0    Cholecalciferol (VITAMIN D3) 125 MCG (5000 UT) TABS, Take 5,000 Units by mouth daily, Disp: , Rfl:     ferrous sulfate (IRON 325) 325 (65 Fe) MG tablet, Take 1 tablet by mouth 2 times daily, Disp: 60 tablet, Rfl: 5    Cyanocobalamin (VITAMIN B 12) 500 MCG TABS, Take 2 tablets by mouth daily as needed, Disp: , Rfl:     atorvastatin (LIPITOR) 40 MG tablet, TAKE 1 TABLET BY MOUTH NIGHTLY, Disp: 90 tablet, Rfl: 3    lisinopril (PRINIVIL;ZESTRIL) 10 MG tablet, Take 1 tablet by mouth 2 times daily, Disp: 180 tablet, Rfl: 3    Physical Exam:  Vital signs and Nurse's note reviewed  Gen:  A&Ox3, NAD. Pleasant and cooperative. HEENT: PERRLA, EOMI, no scleral icterus  Neck:  no goiter  CVS: Regular rate and rhythm  Resp: Good bilateral air entry, no active wheezing, no labored breathing  Abd: soft, non-tender, non-distended, bowel sounds present. Ext: Moves all extremities, no gross focal motor deficits  Skin: No erythema or ulcerations     Labs:   Lab Results   Component Value Date    WBC 5.1 11/17/2020    HGB 7.5 11/17/2020    HCT 26.7 11/17/2020    MCV 79.0 11/17/2020     11/17/2020     Lab Results   Component Value Date     11/17/2020    K 3.9 11/17/2020    CL 95 11/17/2020    CO2 21 11/17/2020    BUN 20 11/17/2020    CREATININE 0.84 11/17/2020    GLUCOSE 143 11/17/2020    CALCIUM 8.9 11/17/2020     Lab Results   Component Value Date    ALKPHOS 109 11/17/2020    ALT 20 11/17/2020    AST 26 11/17/2020    PROT 8.5 11/17/2020    BILITOT 0.44 11/17/2020    LABALBU 2.7 11/17/2020     Impressions/Recommendations:     Iron deficiency anemia, hgb less than 8, previous hgb over 13 in 2018. Positive weight loss, never has had a colonoscopy. She will be seeing hematologist later today. She is refusing recommended EGD and colonoscopy today. She is not on blood thinners. She started po iron supplements.  She had a recent FIT test that was negative. After detailed discussion today patient and  want to meet with hematologist later today and then call us back. I made it very clear that an EGD and colonoscopy are recommended from a surgical standpoint and that is why she was referred here today, for anemia workup. We will schedule her for upper and lower endoscopy when they are finally agreeable. Gave  business card with name and phone number.  voices understanding about plan to call us back when ready to schedule the upper and lower endoscopies.      Amanda Hall DO, MPH, [address] [place] [address] office [phone]  [place] office [phone]

## 2020-11-25 NOTE — Clinical Note
See my impressions/plan note. We are waiting to have  call back to tell us if we can set up my recommended EGD and colonoscopy for severe anemia.  They were going to meet with heme/onc later today 11/25/20 and then  was going to call back to confirm etc.

## 2020-11-30 ENCOUNTER — HOSPITAL ENCOUNTER (OUTPATIENT)
Dept: INFUSION THERAPY | Age: 80
Discharge: HOME | End: 2020-11-30
Payer: MEDICARE

## 2020-11-30 DIAGNOSIS — D50.9 MICROCYTIC ANEMIA: ICD-10-CM

## 2020-11-30 DIAGNOSIS — D63.1 ANEMIA OF CHRONIC RENAL FAILURE, STAGE 3A (HCC): ICD-10-CM

## 2020-11-30 DIAGNOSIS — N18.31 ANEMIA OF CHRONIC RENAL FAILURE, STAGE 3A (HCC): ICD-10-CM

## 2020-11-30 LAB
ABSOLUTE EOS #: 0.04 K/UL (ref 0–0.44)
ABSOLUTE IMMATURE GRANULOCYTE: 0.06 K/UL (ref 0–0.3)
ABSOLUTE LYMPH #: 1.57 K/UL (ref 1.1–3.7)
ABSOLUTE MONO #: 0.56 K/UL (ref 0.1–1.2)
BASOPHILS # BLD: 1 % (ref 0–2)
BASOPHILS ABSOLUTE: 0.04 K/UL (ref 0–0.2)
DIFFERENTIAL TYPE: ABNORMAL
EOSINOPHILS RELATIVE PERCENT: 1 % (ref 1–4)
HCT VFR BLD CALC: 25.2 % (ref 36.3–47.1)
HEMOGLOBIN: 7.3 G/DL (ref 11.9–15.1)
IMMATURE GRANULOCYTES: 1 %
LYMPHOCYTES # BLD: 22 % (ref 24–43)
MCH RBC QN AUTO: 22.3 PG (ref 25.2–33.5)
MCHC RBC AUTO-ENTMCNC: 29 G/DL (ref 28.4–34.8)
MCV RBC AUTO: 76.8 FL (ref 82.6–102.9)
MONOCYTES # BLD: 8 % (ref 3–12)
NRBC AUTOMATED: 0 PER 100 WBC
PDW BLD-RTO: 17.2 % (ref 11.8–14.4)
PLATELET # BLD: 386 K/UL (ref 138–453)
PLATELET ESTIMATE: ABNORMAL
PMV BLD AUTO: 10.4 FL (ref 8.1–13.5)
RBC # BLD: 3.28 M/UL (ref 3.95–5.11)
RBC # BLD: ABNORMAL 10*6/UL
SEG NEUTROPHILS: 67 % (ref 36–65)
SEGMENTED NEUTROPHILS ABSOLUTE COUNT: 4.76 K/UL (ref 1.5–8.1)
WBC # BLD: 7 K/UL (ref 3.5–11.3)
WBC # BLD: ABNORMAL 10*3/UL

## 2020-11-30 PROCEDURE — 85025 COMPLETE CBC W/AUTO DIFF WBC: CPT

## 2020-11-30 PROCEDURE — 84238 ASSAY NONENDOCRINE RECEPTOR: CPT

## 2020-12-01 ENCOUNTER — HOSPITAL ENCOUNTER (OUTPATIENT)
Dept: INFUSION THERAPY | Age: 80
Discharge: HOME | End: 2020-12-01
Payer: MEDICARE

## 2020-12-01 ENCOUNTER — TELEPHONE (OUTPATIENT)
Dept: SURGERY | Age: 80
End: 2020-12-01

## 2020-12-01 VITALS
HEART RATE: 76 BPM | TEMPERATURE: 97.2 F | DIASTOLIC BLOOD PRESSURE: 55 MMHG | RESPIRATION RATE: 16 BRPM | SYSTOLIC BLOOD PRESSURE: 131 MMHG

## 2020-12-01 DIAGNOSIS — D63.1 ANEMIA OF CHRONIC RENAL FAILURE, STAGE 3A (HCC): Primary | ICD-10-CM

## 2020-12-01 DIAGNOSIS — N18.31 ANEMIA OF CHRONIC RENAL FAILURE, STAGE 3A (HCC): Primary | ICD-10-CM

## 2020-12-01 PROCEDURE — 96372 THER/PROPH/DIAG INJ SC/IM: CPT

## 2020-12-01 PROCEDURE — 6360000002 HC RX W HCPCS: Performed by: INTERNAL MEDICINE

## 2020-12-01 RX ADMIN — DARBEPOETIN ALFA 100 MCG: 100 INJECTION, SOLUTION INTRAVENOUS; SUBCUTANEOUS at 13:21

## 2020-12-02 LAB — SOLUBLE TRANSFERRIN RECEPT: 7 MG/L (ref 1.9–4.4)

## 2020-12-02 NOTE — PROGRESS NOTES
_           Chief Complaint   Patient presents with    Follow-up     F/U HEIDI. She has been doing pretty good. DIAGNOSIS:       Microcytic anemia  Iron deficiency  Elevated ferritin  History of CVA with left-sided weakness. Resolving. Anemia of chronic renal insufficiency      CURRENT THERAPY:         Work-up in progress  Start Aranesp for anemia chronic renal insufficiency    BRIEF CASE HISTORY:      Ms. Angel Frederick is a very pleasant 79 y.o. female with history of multiple co morbidities as listed. The patient is referred for iron deficiency anemia. Patient was doing very well over very long duration with no underlying medical problems. She had stroke in February 2018 with left-sided weakness. She has significant improvement in her power and function since then. She was maintained on aspirin and vitamins since that time. The patient's last evaluation with her PCP showed severe anemia. Further work-up showed very low iron level with elevated ferritin. Patient does not have any major symptoms. She has increasing weakness and fatigue since January of this year. She has no headaches or dizziness. No palpitation. No shortness of breath. Patient denies any active bleeding. No melena or hematochezia. No hematemesis. No vaginal bleeding. No hematuria. She had stool guaiac test which was negative. She was started on oral iron on October 16 and it is tolerated fairly well. Mild constipation. No other side effects. Patient denies smoking or alcohol drinking. .   INTERIM HISTORY:   Patient seen for follow-up anemia after work-up. Patient is clinically stable. Continues to have weakness and fatigue. No dizziness. No active bleeding. No fever or infections.       PAST MEDICAL HISTORY: has a past medical history of B12 deficiency, CVA (cerebral vascular accident) ([address] CVD (cerebrovascular disease), and Hypertension. PAST SURGICAL HISTORY: has no past surgical history on file. CURRENT MEDICATIONS:  has a current medication list which includes the following prescription(s): pantoprazole, hydroxyzine, vitamin d3, vitamin b 12, atorvastatin, and lisinopril. ALLERGIES:  is allergic to mirtazapine. FAMILY HISTORY: Negative for any hematological or oncological conditions. SOCIAL HISTORY:  reports that she has never smoked. She has never used smokeless tobacco. She reports that she does not drink alcohol or use drugs. REVIEW OF SYSTEMS:     · General: Positive for weakness and fatigue. No unanticipated weight loss or decreased appetite. No fever or chills. · Eyes: No blurred vision, eye pain or double vision. · Ears: No hearing problems or drainage. No tinnitus. · Throat: No sore throat, problems with swallowing or dysphagia. · Respiratory: No cough, sputum or hemoptysis. No shortness of breath. No pleuritic chest pain. · Cardiovascular: No chest pain, orthopnea or PND. No lower extremity edema. No palpitation. · Gastrointestinal: No problems with swallowing. No abdominal pain or bloating. No nausea or vomiting. No diarrhea or constipation. No GI bleeding. · Genitourinary: No dysuria, hematuria, frequency or urgency. · Musculoskeletal: No muscle aches or pains. No limitation of movement. No back pain. No gait disturbance, No joint complaints. · Dermatologic: No skin rashes or pruritus. No skin lesions or discolorations. · Psychiatric: No depression, anxiety, or stress or signs of schizophrenia. No change in mood or affect. · Hematologic: No history of bleeding tendency. No bruises or ecchymosis. No history of clotting problems. · Infectious disease: No fever, chills or frequent infections. · Endocrine: No polydipsia or polyuria. No temperature intolerance. · Neurologic: History of stroke with left-sided weakness resolving.   · Allergic/Immunologic: No nasal congestion or hives. No repeated infections. PHYSICAL EXAM:  The patient is not in acute distress. Vital signs: Blood pressure (!) 149/63, pulse 72, temperature 98.6 °F (37 °C), temperature source Temporal, resp. rate 16, height 5' 7\" (1.702 m), weight 114 lb 4.8 oz (51.8 kg), not currently breastfeeding.      General appearance - well appearing, not in pain or distress  Mental status - good mood, alert and oriented  Eyes - pupils equal and reactive, extraocular eye movements intact  Ears - bilateral TM's and external ear canals normal  Nose - normal and patent, no erythema, discharge or polyps  Mouth - mucous membranes moist, pharynx normal without lesions  Neck - supple, no significant adenopathy  Lymphatics - no palpable lymphadenopathy, no hepatosplenomegaly  Chest - clear to auscultation, no wheezes, rales or rhonchi, symmetric air entry  Heart - normal rate, regular rhythm, normal S1, S2, no murmurs, rubs, clicks or gallops  Abdomen - soft, nontender, nondistended, no masses or organomegaly  Neurological - alert, oriented, normal speech, no focal findings or movement disorder noted  Musculoskeletal - no joint tenderness, deformity or swelling  Extremities - peripheral pulses normal, no pedal edema, no clubbing or cyanosis  Skin - normal coloration and turgor, no rashes, no suspicious skin lesions noted     Review of Diagnostic data:   Lab Results   Component Value Date    WBC 7.0 11/30/2020    HGB 7.3 (L) 11/30/2020    HCT 25.2 (L) 11/30/2020    MCV 76.8 (L) 11/30/2020     11/30/2020       Chemistry        Component Value Date/Time     (L) 11/17/2020 1038    K 3.9 11/17/2020 1038    CL 95 (L) 11/17/2020 1038    CO2 21 11/17/2020 1038    BUN 20 11/17/2020 1038    CREATININE 0.84 11/17/2020 1038        Component Value Date/Time    CALCIUM 8.9 11/17/2020 1038    ALKPHOS 109 (H) 11/17/2020 1038    AST 26 11/17/2020 1038    ALT 20 11/17/2020 1038    BILITOT 0.44 11/17/2020 1038        Lab Results   Component Value Date    IRON 18 (L) 11/17/2020    TIBC 155 (L) 11/17/2020    FERRITIN 1,139 (H) 11/17/2020         IMPRESSION:   Microcytic anemia  Iron deficiency  Elevated ferritin  History of CVA with left-sided weakness. Resolving. Anemia of chronic renal insufficiency    PLAN: I reviewed the labs as above and discussed with the patient. I explained to the patient the nature of this hematologic problem. I explained the significance of these abnormalities in layman language. Reviewing patient's labs obviously she had absolutely normal CBC in 3 occasions in February 2018. She had hemoglobin around 14 at that time. Lab test from October 16 showed severe anemia with hemoglobin down to 7.2 but at the same time MCV showed only slight drop compared to her baseline. .  The labs showed elevated ferritin with low serum iron and saturation. She had elevated vitamin T39 and normal folic acid. Chemistry tests were nonsignificant except for a mild hyponatremia and slight elevated creatinine. She has stage III chronic renal insufficiency. Lab results were reviewed and explained. Labs are not classical for iron deficiency. Likely multifactorial. In part chronic renal insufficiency. MDS needs to be ruled out. We will send for soluble transferrin receptor and will do testing with next generation sequencing for MDS. In the interim I will start treatment with Aranesp for anemia of chronic renal insufficiency. We will monitor for response to treatment. Patient's questions were answered to the best of her satisfaction and she verbalized full understanding and agreement.                             [address] [place] [address] [facility]                              This note is created with the assistance of a speech recognition program.  While intending to generate a document that actually reflects the content of the visit, the document can still have some errors including those of syntax and sound a like substitutions which may escape proof reading. It such instances, actual meaning can be extrapolated by contextual diversion.

## 2020-12-14 ENCOUNTER — HOSPITAL ENCOUNTER (OUTPATIENT)
Dept: LAB | Age: 80
Discharge: HOME | End: 2020-12-14
Payer: MEDICARE

## 2020-12-14 LAB
ABSOLUTE EOS #: 0.06 K/UL (ref 0–0.44)
ABSOLUTE IMMATURE GRANULOCYTE: 0.06 K/UL (ref 0–0.3)
ABSOLUTE LYMPH #: 1.43 K/UL (ref 1.1–3.7)
ABSOLUTE MONO #: 0.46 K/UL (ref 0.1–1.2)
BASOPHILS # BLD: 1 % (ref 0–2)
BASOPHILS ABSOLUTE: 0.06 K/UL (ref 0–0.2)
DIFFERENTIAL TYPE: ABNORMAL
EOSINOPHILS RELATIVE PERCENT: 1 % (ref 1–4)
HCT VFR BLD CALC: 23.7 % (ref 36.3–47.1)
HEMOGLOBIN: 6.7 G/DL (ref 11.9–15.1)
IMMATURE GRANULOCYTES: 1 %
LYMPHOCYTES # BLD: 25 % (ref 24–43)
MCH RBC QN AUTO: 22.3 PG (ref 25.2–33.5)
MCHC RBC AUTO-ENTMCNC: 28.3 G/DL (ref 28.4–34.8)
MCV RBC AUTO: 78.7 FL (ref 82.6–102.9)
MONOCYTES # BLD: 8 % (ref 3–12)
MORPHOLOGY: ABNORMAL
NRBC AUTOMATED: 0 PER 100 WBC
PDW BLD-RTO: 16.9 % (ref 11.8–14.4)
PLATELET # BLD: 360 K/UL (ref 138–453)
PLATELET ESTIMATE: ABNORMAL
PMV BLD AUTO: 10.2 FL (ref 8.1–13.5)
RBC # BLD: 3.01 M/UL (ref 3.95–5.11)
RBC # BLD: ABNORMAL 10*6/UL
SEG NEUTROPHILS: 64 % (ref 36–65)
SEGMENTED NEUTROPHILS ABSOLUTE COUNT: 3.63 K/UL (ref 1.5–8.1)
WBC # BLD: 5.7 K/UL (ref 3.5–11.3)
WBC # BLD: ABNORMAL 10*3/UL

## 2020-12-14 PROCEDURE — 85025 COMPLETE CBC W/AUTO DIFF WBC: CPT

## 2020-12-14 PROCEDURE — 36415 COLL VENOUS BLD VENIPUNCTURE: CPT

## 2020-12-14 RX ORDER — SODIUM CHLORIDE 9 MG/ML
INJECTION, SOLUTION INTRAVENOUS CONTINUOUS
Status: CANCELLED | OUTPATIENT
Start: 2020-12-15

## 2020-12-14 RX ORDER — DIPHENHYDRAMINE HYDROCHLORIDE 50 MG/ML
50 INJECTION INTRAMUSCULAR; INTRAVENOUS ONCE
Status: CANCELLED | OUTPATIENT
Start: 2020-12-15 | End: 2020-12-15

## 2020-12-14 RX ORDER — SODIUM CHLORIDE 0.9 % (FLUSH) 0.9 %
20 SYRINGE (ML) INJECTION PRN
Status: CANCELLED | OUTPATIENT
Start: 2020-12-15

## 2020-12-14 RX ORDER — EPINEPHRINE 1 MG/ML
0.3 INJECTION, SOLUTION, CONCENTRATE INTRAVENOUS PRN
Status: CANCELLED | OUTPATIENT
Start: 2020-12-15

## 2020-12-14 RX ORDER — METHYLPREDNISOLONE SODIUM SUCCINATE 125 MG/2ML
125 INJECTION, POWDER, LYOPHILIZED, FOR SOLUTION INTRAMUSCULAR; INTRAVENOUS ONCE
Status: CANCELLED | OUTPATIENT
Start: 2020-12-15 | End: 2020-12-15

## 2020-12-14 RX ORDER — 0.9 % SODIUM CHLORIDE 0.9 %
250 INTRAVENOUS SOLUTION INTRAVENOUS ONCE
Status: CANCELLED | OUTPATIENT
Start: 2020-12-15 | End: 2020-12-15

## 2020-12-15 ENCOUNTER — HOSPITAL ENCOUNTER (OUTPATIENT)
Dept: INFUSION THERAPY | Age: 80
Discharge: HOME | End: 2020-12-15
Payer: MEDICARE

## 2020-12-15 VITALS
SYSTOLIC BLOOD PRESSURE: 131 MMHG | TEMPERATURE: 97.1 F | RESPIRATION RATE: 18 BRPM | DIASTOLIC BLOOD PRESSURE: 60 MMHG | HEART RATE: 64 BPM

## 2020-12-15 DIAGNOSIS — D63.1 ANEMIA OF CHRONIC RENAL FAILURE, STAGE 3A (HCC): Primary | ICD-10-CM

## 2020-12-15 DIAGNOSIS — N18.31 ANEMIA OF CHRONIC RENAL FAILURE, STAGE 3A (HCC): Primary | ICD-10-CM

## 2020-12-15 PROCEDURE — 36430 TRANSFUSION BLD/BLD COMPNT: CPT

## 2020-12-15 PROCEDURE — 2580000003 HC RX 258: Performed by: INTERNAL MEDICINE

## 2020-12-15 PROCEDURE — 96415 CHEMO IV INFUSION ADDL HR: CPT

## 2020-12-15 PROCEDURE — 86901 BLOOD TYPING SEROLOGIC RH(D): CPT

## 2020-12-15 PROCEDURE — 86920 COMPATIBILITY TEST SPIN: CPT

## 2020-12-15 PROCEDURE — P9016 RBC LEUKOCYTES REDUCED: HCPCS

## 2020-12-15 PROCEDURE — 86850 RBC ANTIBODY SCREEN: CPT

## 2020-12-15 PROCEDURE — 86900 BLOOD TYPING SEROLOGIC ABO: CPT

## 2020-12-15 PROCEDURE — 96413 CHEMO IV INFUSION 1 HR: CPT

## 2020-12-15 RX ORDER — 0.9 % SODIUM CHLORIDE 0.9 %
250 INTRAVENOUS SOLUTION INTRAVENOUS ONCE
Status: CANCELLED | OUTPATIENT
Start: 2020-12-15 | End: 2020-12-15

## 2020-12-15 RX ORDER — SODIUM CHLORIDE 0.9 % (FLUSH) 0.9 %
20 SYRINGE (ML) INJECTION PRN
Status: CANCELLED | OUTPATIENT
Start: 2020-12-15

## 2020-12-15 RX ORDER — METHYLPREDNISOLONE SODIUM SUCCINATE 125 MG/2ML
125 INJECTION, POWDER, LYOPHILIZED, FOR SOLUTION INTRAMUSCULAR; INTRAVENOUS ONCE
Status: CANCELLED | OUTPATIENT
Start: 2020-12-15 | End: 2020-12-15

## 2020-12-15 RX ORDER — 0.9 % SODIUM CHLORIDE 0.9 %
250 INTRAVENOUS SOLUTION INTRAVENOUS ONCE
Status: COMPLETED | OUTPATIENT
Start: 2020-12-15 | End: 2020-12-15

## 2020-12-15 RX ORDER — EPINEPHRINE 1 MG/ML
0.3 INJECTION, SOLUTION, CONCENTRATE INTRAVENOUS PRN
Status: CANCELLED | OUTPATIENT
Start: 2020-12-15

## 2020-12-15 RX ORDER — DIPHENHYDRAMINE HYDROCHLORIDE 50 MG/ML
50 INJECTION INTRAMUSCULAR; INTRAVENOUS ONCE
Status: CANCELLED | OUTPATIENT
Start: 2020-12-15 | End: 2020-12-15

## 2020-12-15 RX ORDER — SODIUM CHLORIDE 0.9 % (FLUSH) 0.9 %
20 SYRINGE (ML) INJECTION PRN
Status: DISCONTINUED | OUTPATIENT
Start: 2020-12-15 | End: 2020-12-16 | Stop reason: HOSPADM

## 2020-12-15 RX ORDER — SODIUM CHLORIDE 9 MG/ML
INJECTION, SOLUTION INTRAVENOUS CONTINUOUS
Status: CANCELLED | OUTPATIENT
Start: 2020-12-15

## 2020-12-15 RX ADMIN — SODIUM CHLORIDE 250 ML: 9 INJECTION, SOLUTION INTRAVENOUS at 10:55

## 2020-12-15 RX ADMIN — Medication 10 ML: at 09:11

## 2020-12-16 ENCOUNTER — OFFICE VISIT (OUTPATIENT)
Dept: ONCOLOGY | Age: 80
End: 2020-12-16
Payer: MEDICARE

## 2020-12-16 VITALS
HEART RATE: 76 BPM | BODY MASS INDEX: 18.66 KG/M2 | TEMPERATURE: 99.3 F | HEIGHT: 65 IN | WEIGHT: 112 LBS | DIASTOLIC BLOOD PRESSURE: 61 MMHG | SYSTOLIC BLOOD PRESSURE: 147 MMHG | RESPIRATION RATE: 18 BRPM

## 2020-12-16 LAB
ABO/RH: NORMAL
ANTIBODY SCREEN: NEGATIVE
ARM BAND NUMBER: NORMAL
BLD PROD TYP BPU: NORMAL
CROSSMATCH RESULT: NORMAL
DISPENSE STATUS BLOOD BANK: NORMAL
EXPIRATION DATE: NORMAL
TRANSFUSION STATUS: NORMAL
UNIT DIVISION: 0
UNIT NUMBER: NORMAL

## 2020-12-16 PROCEDURE — 1123F ACP DISCUSS/DSCN MKR DOCD: CPT | Performed by: INTERNAL MEDICINE

## 2020-12-16 PROCEDURE — G8484 FLU IMMUNIZE NO ADMIN: HCPCS | Performed by: INTERNAL MEDICINE

## 2020-12-16 PROCEDURE — G8400 PT W/DXA NO RESULTS DOC: HCPCS | Performed by: INTERNAL MEDICINE

## 2020-12-16 PROCEDURE — 99211 OFF/OP EST MAY X REQ PHY/QHP: CPT

## 2020-12-16 PROCEDURE — 99214 OFFICE O/P EST MOD 30 MIN: CPT | Performed by: INTERNAL MEDICINE

## 2020-12-16 PROCEDURE — 1036F TOBACCO NON-USER: CPT | Performed by: INTERNAL MEDICINE

## 2020-12-16 PROCEDURE — 1090F PRES/ABSN URINE INCON ASSESS: CPT | Performed by: INTERNAL MEDICINE

## 2020-12-16 PROCEDURE — G8420 CALC BMI NORM PARAMETERS: HCPCS | Performed by: INTERNAL MEDICINE

## 2020-12-16 PROCEDURE — 4040F PNEUMOC VAC/ADMIN/RCVD: CPT | Performed by: INTERNAL MEDICINE

## 2020-12-16 PROCEDURE — G8427 DOCREV CUR MEDS BY ELIG CLIN: HCPCS | Performed by: INTERNAL MEDICINE

## 2020-12-16 NOTE — PROGRESS NOTES
_           Chief Complaint   Patient presents with    Follow-up     microcytic anemia     DIAGNOSIS:       Microcytic anemia  Iron deficiency  Elevated ferritin  History of CVA with left-sided weakness. Resolving. Anemia of chronic renal insufficiency      CURRENT THERAPY:         Work-up in progress  Start Aranesp for anemia chronic renal insufficiency    BRIEF CASE HISTORY:      Ms. Deidra Escudero is a very pleasant 79 y.o. female with history of multiple co morbidities as listed. The patient is referred for iron deficiency anemia. Patient was doing very well over very long duration with no underlying medical problems. She had stroke in February 2018 with left-sided weakness. She has significant improvement in her power and function since then. She was maintained on aspirin and vitamins since that time. The patient's last evaluation with her PCP showed severe anemia. Further work-up showed very low iron level with elevated ferritin. Patient does not have any major symptoms. She has increasing weakness and fatigue since January of this year. She has no headaches or dizziness. No palpitation. No shortness of breath. Patient denies any active bleeding. No melena or hematochezia. No hematemesis. No vaginal bleeding. No hematuria. She had stool guaiac test which was negative. She was started on oral iron on October 16 and it is tolerated fairly well. Mild constipation. No other side effects. Patient denies smoking or alcohol drinking. . The patient was seen and started on Aranesp. Iron studies were adequate with elevated ferritin. INTERIM HISTORY:   Patient seen for follow-up anemia after work-up. She received 1 dose of Aranesp but shortly after that, her hemoglobin dropped and required blood transfusion.   She continues to do poorly with weight loss, poor appetite, worsening fatigue and back resolving. · Allergic/Immunologic: No nasal congestion or hives. No repeated infections. PHYSICAL EXAM:  The patient is not in acute distress. Vital signs: Blood pressure (!) 147/61, pulse 76, temperature 99.3 °F (37.4 °C), temperature source Temporal, resp. rate 18, height 5' 5\" (1.651 m), weight 112 lb (50.8 kg), not currently breastfeeding.      General appearance -ill appearing cachectic lady  Mental status - good mood, alert and oriented  Eyes - pupils equal and reactive, extraocular eye movements intact  Ears - bilateral TM's and external ear canals normal  Nose - normal and patent, no erythema, discharge or polyps  Mouth - mucous membranes moist, pharynx normal without lesions  Neck - supple, no significant adenopathy  Lymphatics - no palpable lymphadenopathy, no hepatosplenomegaly  Chest - clear to auscultation, no wheezes, rales or rhonchi, symmetric air entry  Heart - normal rate, regular rhythm, normal S1, S2, no murmurs, rubs, clicks or gallops  Abdomen - soft, nontender, nondistended, no masses or organomegaly  Neurological - alert, oriented, normal speech, no focal findings or movement disorder noted  Musculoskeletal - no joint tenderness, deformity or swelling  Extremities - peripheral pulses normal, no pedal edema, no clubbing or cyanosis  Skin -skin rash on her face    Review of Diagnostic data:   Lab Results   Component Value Date    WBC 5.7 12/14/2020    HGB 6.7 (LL) 12/14/2020    HCT 23.7 (L) 12/14/2020    MCV 78.7 (L) 12/14/2020     12/14/2020       Chemistry        Component Value Date/Time     (L) 11/17/2020 1038    K 3.9 11/17/2020 1038    CL 95 (L) 11/17/2020 1038    CO2 21 11/17/2020 1038    BUN 20 11/17/2020 1038    CREATININE 0.84 11/17/2020 1038        Component Value Date/Time    CALCIUM 8.9 11/17/2020 1038    ALKPHOS 109 (H) 11/17/2020 1038    AST 26 11/17/2020 1038    ALT 20 11/17/2020 1038    BILITOT 0.44 11/17/2020 1038        Lab Results   Component Value Date IRON 18 (L) 11/17/2020    TIBC 155 (L) 11/17/2020    FERRITIN 1,139 (H) 11/17/2020         IMPRESSION:   Microcytic anemia  Iron deficiency  Elevated ferritin  History of CVA with left-sided weakness. Resolving. Anemia of chronic renal insufficiency    PLAN:   The patient was seen previously by Dr. Jeremías Zendejas. She was diagnosed with anemia of chronic illness/anemia of chronic renal disease. Once was started on Aranesp she only received 1 dose. She received blood transfusion last week. Looking at the totality of her symptoms including weight loss, abdominal pain, poor appetite and worsening anemia, I am really concerned about of the cause of her anemia. Underlying malignancy needs to be excluded. We will obtain a CT scan of chest and upper abdomen  We will continue Aranesp  Return after CT    This note is created with the assistance of a speech recognition program.  While intending to generate a document that actually reflects the content of the visit, the document can still have some errors including those of syntax and sound a like substitutions which may escape proof reading. It such instances, actual meaning can be extrapolated by contextual diversion.

## 2020-12-21 ENCOUNTER — HOSPITAL ENCOUNTER (OUTPATIENT)
Dept: LAB | Age: 80
Discharge: HOME | End: 2020-12-21
Payer: MEDICARE

## 2020-12-21 LAB
ABSOLUTE EOS #: <0.03 K/UL (ref 0–0.44)
ABSOLUTE IMMATURE GRANULOCYTE: 0.08 K/UL (ref 0–0.3)
ABSOLUTE LYMPH #: 1.52 K/UL (ref 1.1–3.7)
ABSOLUTE MONO #: 0.55 K/UL (ref 0.1–1.2)
BASOPHILS # BLD: 0 % (ref 0–2)
BASOPHILS ABSOLUTE: 0.03 K/UL (ref 0–0.2)
BUN BLDV-MCNC: 14 MG/DL (ref 8–23)
CREAT SERPL-MCNC: 0.9 MG/DL (ref 0.5–0.9)
DIFFERENTIAL TYPE: ABNORMAL
EOSINOPHILS RELATIVE PERCENT: 0 % (ref 1–4)
GFR AFRICAN AMERICAN: >60 ML/MIN
GFR NON-AFRICAN AMERICAN: >60 ML/MIN
GFR SERPL CREATININE-BSD FRML MDRD: NORMAL ML/MIN/{1.73_M2}
GFR SERPL CREATININE-BSD FRML MDRD: NORMAL ML/MIN/{1.73_M2}
HCT VFR BLD CALC: 27.4 % (ref 36.3–47.1)
HEMOGLOBIN: 8 G/DL (ref 11.9–15.1)
IMMATURE GRANULOCYTES: 1 %
LYMPHOCYTES # BLD: 21 % (ref 24–43)
MCH RBC QN AUTO: 23.5 PG (ref 25.2–33.5)
MCHC RBC AUTO-ENTMCNC: 29.2 G/DL (ref 28.4–34.8)
MCV RBC AUTO: 80.4 FL (ref 82.6–102.9)
MONOCYTES # BLD: 8 % (ref 3–12)
NRBC AUTOMATED: 0 PER 100 WBC
PDW BLD-RTO: 18.2 % (ref 11.8–14.4)
PLATELET # BLD: 403 K/UL (ref 138–453)
PLATELET ESTIMATE: ABNORMAL
PMV BLD AUTO: 9.6 FL (ref 8.1–13.5)
RBC # BLD: 3.41 M/UL (ref 3.95–5.11)
RBC # BLD: ABNORMAL 10*6/UL
SEG NEUTROPHILS: 70 % (ref 36–65)
SEGMENTED NEUTROPHILS ABSOLUTE COUNT: 5.01 K/UL (ref 1.5–8.1)
WBC # BLD: 7.2 K/UL (ref 3.5–11.3)
WBC # BLD: ABNORMAL 10*3/UL

## 2020-12-21 PROCEDURE — 82565 ASSAY OF CREATININE: CPT

## 2020-12-21 PROCEDURE — 85025 COMPLETE CBC W/AUTO DIFF WBC: CPT

## 2020-12-21 PROCEDURE — 84520 ASSAY OF UREA NITROGEN: CPT

## 2020-12-21 PROCEDURE — 36415 COLL VENOUS BLD VENIPUNCTURE: CPT

## 2020-12-22 ENCOUNTER — HOSPITAL ENCOUNTER (OUTPATIENT)
Dept: INFUSION THERAPY | Age: 80
Discharge: HOME | End: 2020-12-22
Payer: MEDICARE

## 2020-12-22 VITALS — DIASTOLIC BLOOD PRESSURE: 62 MMHG | TEMPERATURE: 97 F | SYSTOLIC BLOOD PRESSURE: 139 MMHG | HEART RATE: 74 BPM

## 2020-12-22 DIAGNOSIS — D63.1 ANEMIA OF CHRONIC RENAL FAILURE, STAGE 3A (HCC): Primary | ICD-10-CM

## 2020-12-22 DIAGNOSIS — N18.31 ANEMIA OF CHRONIC RENAL FAILURE, STAGE 3A (HCC): Primary | ICD-10-CM

## 2020-12-22 PROCEDURE — 6360000002 HC RX W HCPCS: Performed by: INTERNAL MEDICINE

## 2020-12-22 PROCEDURE — 96372 THER/PROPH/DIAG INJ SC/IM: CPT

## 2020-12-22 RX ADMIN — DARBEPOETIN ALFA 100 MCG: 100 INJECTION, SOLUTION INTRAVENOUS; SUBCUTANEOUS at 14:08

## 2020-12-24 ENCOUNTER — HOSPITAL ENCOUNTER (OUTPATIENT)
Dept: CT IMAGING | Age: 80
Discharge: HOME | End: 2020-12-26
Payer: MEDICARE

## 2020-12-24 PROCEDURE — 74177 CT ABD & PELVIS W/CONTRAST: CPT

## 2020-12-24 PROCEDURE — 71260 CT THORAX DX C+: CPT

## 2020-12-24 PROCEDURE — 6360000004 HC RX CONTRAST MEDICATION: Performed by: INTERNAL MEDICINE

## 2020-12-24 PROCEDURE — 6360000004 HC RX CONTRAST MEDICATION: Performed by: NURSE PRACTITIONER

## 2020-12-24 RX ADMIN — IOPAMIDOL 75 ML: 755 INJECTION, SOLUTION INTRAVENOUS at 15:00

## 2020-12-24 RX ADMIN — IOPAMIDOL 18 ML: 755 INJECTION, SOLUTION INTRAVENOUS at 15:05

## 2021-01-04 ENCOUNTER — HOSPITAL ENCOUNTER (OUTPATIENT)
Dept: LAB | Age: 81
Discharge: HOME | End: 2021-01-04
Payer: MEDICARE

## 2021-01-04 DIAGNOSIS — N18.31 ANEMIA OF CHRONIC RENAL FAILURE, STAGE 3A (HCC): ICD-10-CM

## 2021-01-04 DIAGNOSIS — D63.1 ANEMIA OF CHRONIC RENAL FAILURE, STAGE 3A (HCC): ICD-10-CM

## 2021-01-04 LAB
ABSOLUTE EOS #: 0 K/UL (ref 0–0.44)
ABSOLUTE IMMATURE GRANULOCYTE: 0.07 K/UL (ref 0–0.3)
ABSOLUTE LYMPH #: 0.89 K/UL (ref 1.1–3.7)
ABSOLUTE MONO #: 0.59 K/UL (ref 0.1–1.2)
BASOPHILS # BLD: 1 % (ref 0–2)
BASOPHILS ABSOLUTE: 0.07 K/UL (ref 0–0.2)
DIFFERENTIAL TYPE: ABNORMAL
EOSINOPHILS RELATIVE PERCENT: 0 % (ref 1–4)
HCT VFR BLD CALC: 26.3 % (ref 36.3–47.1)
HEMOGLOBIN: 7.5 G/DL (ref 11.9–15.1)
IMMATURE GRANULOCYTES: 1 %
LYMPHOCYTES # BLD: 12 % (ref 24–43)
MCH RBC QN AUTO: 22.4 PG (ref 25.2–33.5)
MCHC RBC AUTO-ENTMCNC: 28.5 G/DL (ref 28.4–34.8)
MCV RBC AUTO: 78.5 FL (ref 82.6–102.9)
MONOCYTES # BLD: 8 % (ref 3–12)
NRBC AUTOMATED: 0 PER 100 WBC
PDW BLD-RTO: 17.8 % (ref 11.8–14.4)
PLATELET # BLD: 380 K/UL (ref 138–453)
PLATELET ESTIMATE: ABNORMAL
PMV BLD AUTO: 10 FL (ref 8.1–13.5)
RBC # BLD: 3.35 M/UL (ref 3.95–5.11)
RBC # BLD: ABNORMAL 10*6/UL
SEG NEUTROPHILS: 78 % (ref 36–65)
SEGMENTED NEUTROPHILS ABSOLUTE COUNT: 5.78 K/UL (ref 1.5–8.1)
WBC # BLD: 7.4 K/UL (ref 3.5–11.3)
WBC # BLD: ABNORMAL 10*3/UL

## 2021-01-04 PROCEDURE — 85025 COMPLETE CBC W/AUTO DIFF WBC: CPT

## 2021-01-04 PROCEDURE — 36415 COLL VENOUS BLD VENIPUNCTURE: CPT

## 2021-01-05 ENCOUNTER — HOSPITAL ENCOUNTER (OUTPATIENT)
Dept: INFUSION THERAPY | Age: 81
Discharge: HOME | End: 2021-01-05
Payer: MEDICARE

## 2021-01-05 VITALS
SYSTOLIC BLOOD PRESSURE: 120 MMHG | RESPIRATION RATE: 16 BRPM | HEART RATE: 74 BPM | DIASTOLIC BLOOD PRESSURE: 50 MMHG | TEMPERATURE: 97.4 F

## 2021-01-05 DIAGNOSIS — D63.1 ANEMIA OF CHRONIC RENAL FAILURE, STAGE 3A (HCC): Primary | ICD-10-CM

## 2021-01-05 DIAGNOSIS — N18.31 ANEMIA OF CHRONIC RENAL FAILURE, STAGE 3A (HCC): Primary | ICD-10-CM

## 2021-01-05 PROCEDURE — 96372 THER/PROPH/DIAG INJ SC/IM: CPT

## 2021-01-05 PROCEDURE — 6360000002 HC RX W HCPCS: Performed by: INTERNAL MEDICINE

## 2021-01-05 RX ADMIN — DARBEPOETIN ALFA 100 MCG: 100 INJECTION, SOLUTION INTRAVENOUS; SUBCUTANEOUS at 14:27

## 2021-01-07 ENCOUNTER — OFFICE VISIT (OUTPATIENT)
Dept: ONCOLOGY | Age: 81
End: 2021-01-07
Payer: MEDICARE

## 2021-01-07 VITALS
RESPIRATION RATE: 18 BRPM | HEIGHT: 65 IN | WEIGHT: 106 LBS | HEART RATE: 89 BPM | BODY MASS INDEX: 17.66 KG/M2 | SYSTOLIC BLOOD PRESSURE: 141 MMHG | DIASTOLIC BLOOD PRESSURE: 58 MMHG | TEMPERATURE: 96.8 F

## 2021-01-07 DIAGNOSIS — N18.31 ANEMIA OF CHRONIC RENAL FAILURE, STAGE 3A (HCC): ICD-10-CM

## 2021-01-07 DIAGNOSIS — N28.89 RENAL MASS, LEFT: Primary | ICD-10-CM

## 2021-01-07 DIAGNOSIS — D63.1 ANEMIA OF CHRONIC RENAL FAILURE, STAGE 3A (HCC): ICD-10-CM

## 2021-01-07 PROCEDURE — G8400 PT W/DXA NO RESULTS DOC: HCPCS | Performed by: INTERNAL MEDICINE

## 2021-01-07 PROCEDURE — 4040F PNEUMOC VAC/ADMIN/RCVD: CPT | Performed by: INTERNAL MEDICINE

## 2021-01-07 PROCEDURE — G8427 DOCREV CUR MEDS BY ELIG CLIN: HCPCS | Performed by: INTERNAL MEDICINE

## 2021-01-07 PROCEDURE — G8484 FLU IMMUNIZE NO ADMIN: HCPCS | Performed by: INTERNAL MEDICINE

## 2021-01-07 PROCEDURE — 99211 OFF/OP EST MAY X REQ PHY/QHP: CPT | Performed by: INTERNAL MEDICINE

## 2021-01-07 PROCEDURE — 99215 OFFICE O/P EST HI 40 MIN: CPT | Performed by: INTERNAL MEDICINE

## 2021-01-07 PROCEDURE — 1036F TOBACCO NON-USER: CPT | Performed by: INTERNAL MEDICINE

## 2021-01-07 PROCEDURE — 1090F PRES/ABSN URINE INCON ASSESS: CPT | Performed by: INTERNAL MEDICINE

## 2021-01-07 PROCEDURE — G8419 CALC BMI OUT NRM PARAM NOF/U: HCPCS | Performed by: INTERNAL MEDICINE

## 2021-01-07 PROCEDURE — 1123F ACP DISCUSS/DSCN MKR DOCD: CPT | Performed by: INTERNAL MEDICINE

## 2021-01-07 NOTE — PROGRESS NOTES
_           Chief Complaint   Patient presents with    Follow-up     anemia of chronic renal failure     DIAGNOSIS:       Microcytic anemia  Iron deficiency  Elevated ferritin  History of CVA with left-sided weakness. Resolving. Anemia of chronic renal insufficiency  Further work-up showed left kidney mass  CURRENT THERAPY:         Work-up in progress for the kidney mass  Start Aranesp for anemia chronic renal insufficiency     BRIEF CASE HISTORY:      Ms. Gege Espinoza is a very pleasant 79 y.o. female with history of multiple co morbidities as listed. The patient is referred for iron deficiency anemia. Patient was doing very well over very long duration with no underlying medical problems. She had stroke in February 2018 with left-sided weakness. She has significant improvement in her power and function since then. She was maintained on aspirin and vitamins since that time. The patient's last evaluation with her PCP showed severe anemia. Further work-up showed very low iron level with elevated ferritin. Patient does not have any major symptoms. She has increasing weakness and fatigue since January of this year. She has no headaches or dizziness. No palpitation. No shortness of breath. Patient denies any active bleeding. No melena or hematochezia. No hematemesis. No vaginal bleeding. No hematuria. She had stool guaiac test which was negative. She was started on oral iron on October 16 and it is tolerated fairly well. Mild constipation. No other side effects. Patient denies smoking or alcohol drinking. . The patient was seen and started on Aranesp. Iron studies were adequate with elevated ferritin. Looking at her symptoms, I was concerned about her weight loss and progressive fatigue. Out of proportion to her anemia. CT scan was done and showed unfortunately the large left kidney mass highly suggestive of renal cell carcinoma. In addition multiple pulmonary nodules were seen all in the subcentimeter except 1 lesion in right upper lobe measuring 1.4 cm. INTERIM HISTORY:   Patient seen for follow-up , accompanied by her family  Are aware of the results of the CT scan. I went over the imaging and reviewed it with the family. They are devastated  but accepting. Continues to lose weight. She has no pain. No dysuria or hematuria. PAST MEDICAL HISTORY: has a past medical history of B12 deficiency, CVA (cerebral vascular accident) ([address] CVD (cerebrovascular disease), and Hypertension. PAST SURGICAL HISTORY: has no past surgical history on file. CURRENT MEDICATIONS:  has a current medication list which includes the following prescription(s): hydroxyzine, pantoprazole, vitamin d3, vitamin b 12, and lisinopril. ALLERGIES:  is allergic to mirtazapine. FAMILY HISTORY: Negative for any hematological or oncological conditions. SOCIAL HISTORY:  reports that she has never smoked. She has never used smokeless tobacco. She reports that she does not drink alcohol or use drugs. REVIEW OF SYSTEMS:     · General: Positive for weakness and fatigue. Weight loss, back pain  · Eyes: No blurred vision, eye pain or double vision. · Ears: No hearing problems or drainage. No tinnitus. · Throat: No sore throat, problems with swallowing or dysphagia. · Respiratory: No cough, sputum or hemoptysis. Fatigue and chest discomfort, shortness of breath  · Cardiovascular: No chest pain, orthopnea or PND. No lower extremity edema. No palpitation. · Gastrointestinal: Poor appetite, nausea, epigastric pain. No diarrhea or bleeding  · Genitourinary: No dysuria, hematuria, frequency or urgency. · Musculoskeletal: No muscle aches or pains. No limitation of movement. No back pain. No gait disturbance, No joint complaints. MCV 78.5 (L) 01/04/2021     01/04/2021       Chemistry        Component Value Date/Time     (L) 11/17/2020 1038    K 3.9 11/17/2020 1038    CL 95 (L) 11/17/2020 1038    CO2 21 11/17/2020 1038    BUN 14 12/21/2020 1039    CREATININE 0.90 12/21/2020 1039        Component Value Date/Time    CALCIUM 8.9 11/17/2020 1038    ALKPHOS 109 (H) 11/17/2020 1038    AST 26 11/17/2020 1038    ALT 20 11/17/2020 1038    BILITOT 0.44 11/17/2020 1038        Lab Results   Component Value Date    IRON 18 (L) 11/17/2020    TIBC 155 (L) 11/17/2020    FERRITIN 1,139 (H) 11/17/2020     CT scan  Impression   Chest:       1. Multiple pulmonary nodules with the largest measuring 1.4 x 0.9 cm in the   right upper lobe.  Additional pulmonary nodules measuring 3-4 mm.  Findings   concerning for pulmonary metastatic disease given the findings in the   abdomen.  Mild emphysema.  Old granulomatous disease. 2. Respiratory motion and mild dependent atelectasis in both lungs. 3. Coronary artery disease.  Atheromatous plaque and atherosclerotic   calcification of the aorta. Abdomen/pelvis:       1. Findings concerning for a large centrally necrotic renal mass likely renal   cell carcinoma involving the inferior half of the left kidney with mass   effect upon the adjacent anatomy including the small bowel loops.  This   measures 11.5 x 9.5 x 11.1 cm in greatest dimensions.  Collateralization of   vessels in the left-sided perinephric space. 2. Appearance of the urinary bladder can be seen with cystitis.  Please   correlate with urinalysis. 3. Fatty liver. 4. Atherosclerotic calcification of the abdominal aorta and branch   vasculature. 5. Probable adrenal hyperplasia. 6. Old granulomatous disease. 7. 7 mm upper pole left renal cyst.       IMPRESSION:   Microcytic anemia  Iron deficiency  Elevated ferritin  History of CVA with left-sided weakness. Resolving.   Anemia of chronic renal insufficiency    PLAN:

## 2021-01-18 ENCOUNTER — HOSPITAL ENCOUNTER (EMERGENCY)
Age: 81
Discharge: HOME | End: 2021-01-18
Attending: EMERGENCY MEDICINE
Payer: MEDICARE

## 2021-01-18 ENCOUNTER — HOSPITAL ENCOUNTER (OUTPATIENT)
Dept: LAB | Age: 81
Discharge: HOME | End: 2021-01-18
Payer: MEDICARE

## 2021-01-18 VITALS
BODY MASS INDEX: 17.64 KG/M2 | DIASTOLIC BLOOD PRESSURE: 47 MMHG | RESPIRATION RATE: 24 BRPM | TEMPERATURE: 98 F | OXYGEN SATURATION: 96 % | HEART RATE: 71 BPM | SYSTOLIC BLOOD PRESSURE: 116 MMHG | WEIGHT: 106 LBS

## 2021-01-18 DIAGNOSIS — N18.31 ANEMIA OF CHRONIC RENAL FAILURE, STAGE 3A (HCC): ICD-10-CM

## 2021-01-18 DIAGNOSIS — D64.9 CHRONIC ANEMIA: ICD-10-CM

## 2021-01-18 DIAGNOSIS — D63.1 ANEMIA OF CHRONIC RENAL FAILURE, STAGE 3A (HCC): ICD-10-CM

## 2021-01-18 DIAGNOSIS — R55 NEAR SYNCOPE: Primary | ICD-10-CM

## 2021-01-18 LAB
ABSOLUTE EOS #: <0.03 K/UL (ref 0–0.44)
ABSOLUTE IMMATURE GRANULOCYTE: 0.06 K/UL (ref 0–0.3)
ABSOLUTE LYMPH #: 1.8 K/UL (ref 1.1–3.7)
ABSOLUTE MONO #: 0.71 K/UL (ref 0.1–1.2)
ALBUMIN SERPL-MCNC: 2 G/DL (ref 3.5–5.2)
ALBUMIN/GLOBULIN RATIO: 0.5 (ref 1–2.5)
ALP BLD-CCNC: 171 U/L (ref 35–104)
ALT SERPL-CCNC: 22 U/L (ref 5–33)
ANION GAP SERPL CALCULATED.3IONS-SCNC: 10 MMOL/L (ref 9–17)
AST SERPL-CCNC: 35 U/L
BASOPHILS # BLD: 1 % (ref 0–2)
BASOPHILS ABSOLUTE: 0.05 K/UL (ref 0–0.2)
BILIRUB SERPL-MCNC: 0.44 MG/DL (ref 0.3–1.2)
BUN BLDV-MCNC: 21 MG/DL (ref 8–23)
BUN/CREAT BLD: 25 (ref 9–20)
C-REACTIVE PROTEIN: 137.8 MG/L (ref 0–5)
CALCIUM SERPL-MCNC: 9 MG/DL (ref 8.6–10.4)
CHLORIDE BLD-SCNC: 92 MMOL/L (ref 98–107)
CO2: 23 MMOL/L (ref 20–31)
CREAT SERPL-MCNC: 0.83 MG/DL (ref 0.5–0.9)
DIFFERENTIAL TYPE: ABNORMAL
EKG ATRIAL RATE: 73 BPM
EKG P AXIS: 59 DEGREES
EKG P-R INTERVAL: 156 MS
EKG Q-T INTERVAL: 428 MS
EKG QRS DURATION: 94 MS
EKG QTC CALCULATION (BAZETT): 471 MS
EKG R AXIS: 2 DEGREES
EKG T AXIS: 36 DEGREES
EKG VENTRICULAR RATE: 73 BPM
EOSINOPHILS RELATIVE PERCENT: 0 % (ref 1–4)
GFR AFRICAN AMERICAN: >60 ML/MIN
GFR NON-AFRICAN AMERICAN: >60 ML/MIN
GFR SERPL CREATININE-BSD FRML MDRD: ABNORMAL ML/MIN/{1.73_M2}
GFR SERPL CREATININE-BSD FRML MDRD: ABNORMAL ML/MIN/{1.73_M2}
GLUCOSE BLD-MCNC: 135 MG/DL (ref 70–99)
HCT VFR BLD CALC: 27.2 % (ref 36.3–47.1)
HEMOGLOBIN: 7.9 G/DL (ref 11.9–15.1)
IMMATURE GRANULOCYTES: 1 %
INR BLD: 1.3
LACTATE DEHYDROGENASE: 126 U/L (ref 135–214)
LYMPHOCYTES # BLD: 21 % (ref 24–43)
MCH RBC QN AUTO: 22.3 PG (ref 25.2–33.5)
MCHC RBC AUTO-ENTMCNC: 29 G/DL (ref 28.4–34.8)
MCV RBC AUTO: 76.8 FL (ref 82.6–102.9)
MONOCYTES # BLD: 8 % (ref 3–12)
NRBC AUTOMATED: 0 PER 100 WBC
PARTIAL THROMBOPLASTIN TIME: 33.2 SEC (ref 23.9–33.8)
PDW BLD-RTO: 17.5 % (ref 11.8–14.4)
PLATELET # BLD: 478 K/UL (ref 138–453)
PLATELET ESTIMATE: ABNORMAL
PMV BLD AUTO: 10.3 FL (ref 8.1–13.5)
POTASSIUM SERPL-SCNC: 4 MMOL/L (ref 3.7–5.3)
PROTHROMBIN TIME: 15.9 SEC (ref 11.5–14.2)
RBC # BLD: 3.54 M/UL (ref 3.95–5.11)
RBC # BLD: ABNORMAL 10*6/UL
SEG NEUTROPHILS: 69 % (ref 36–65)
SEGMENTED NEUTROPHILS ABSOLUTE COUNT: 5.79 K/UL (ref 1.5–8.1)
SODIUM BLD-SCNC: 125 MMOL/L (ref 135–144)
TOTAL PROTEIN: 6.2 G/DL (ref 6.4–8.3)
TROPONIN INTERP: ABNORMAL
TROPONIN INTERP: ABNORMAL
TROPONIN T: ABNORMAL NG/ML
TROPONIN T: ABNORMAL NG/ML
TROPONIN, HIGH SENSITIVITY: 15 NG/L (ref 0–14)
TROPONIN, HIGH SENSITIVITY: 18 NG/L (ref 0–14)
WBC # BLD: 8.4 K/UL (ref 3.5–11.3)
WBC # BLD: ABNORMAL 10*3/UL

## 2021-01-18 PROCEDURE — 84484 ASSAY OF TROPONIN QUANT: CPT

## 2021-01-18 PROCEDURE — 85730 THROMBOPLASTIN TIME PARTIAL: CPT

## 2021-01-18 PROCEDURE — 83615 LACTATE (LD) (LDH) ENZYME: CPT

## 2021-01-18 PROCEDURE — 36415 COLL VENOUS BLD VENIPUNCTURE: CPT

## 2021-01-18 PROCEDURE — 80053 COMPREHEN METABOLIC PANEL: CPT

## 2021-01-18 PROCEDURE — 80048 BASIC METABOLIC PNL TOTAL CA: CPT

## 2021-01-18 PROCEDURE — 86140 C-REACTIVE PROTEIN: CPT

## 2021-01-18 PROCEDURE — 93005 ELECTROCARDIOGRAM TRACING: CPT | Performed by: EMERGENCY MEDICINE

## 2021-01-18 PROCEDURE — 85025 COMPLETE CBC W/AUTO DIFF WBC: CPT

## 2021-01-18 PROCEDURE — 93010 ELECTROCARDIOGRAM REPORT: CPT | Performed by: FAMILY MEDICINE

## 2021-01-18 PROCEDURE — 85610 PROTHROMBIN TIME: CPT

## 2021-01-18 PROCEDURE — 99283 EMERGENCY DEPT VISIT LOW MDM: CPT

## 2021-01-18 PROCEDURE — 2580000003 HC RX 258: Performed by: EMERGENCY MEDICINE

## 2021-01-18 RX ORDER — 0.9 % SODIUM CHLORIDE 0.9 %
500 INTRAVENOUS SOLUTION INTRAVENOUS ONCE
Status: COMPLETED | OUTPATIENT
Start: 2021-01-18 | End: 2021-01-18

## 2021-01-18 RX ADMIN — SODIUM CHLORIDE 500 ML: 9 INJECTION, SOLUTION INTRAVENOUS at 14:20

## 2021-01-18 ASSESSMENT — ENCOUNTER SYMPTOMS
COLOR CHANGE: 0
COUGH: 0
VOMITING: 0
ABDOMINAL PAIN: 0
SORE THROAT: 0
SHORTNESS OF BREATH: 0
NAUSEA: 0

## 2021-01-18 NOTE — ED NOTES
Bed: 14  Expected date:   Expected time:   Means of arrival:   Comments:     Vinay Stewart RN  01/18/21 3485

## 2021-01-19 ENCOUNTER — HOSPITAL ENCOUNTER (OUTPATIENT)
Dept: INFUSION THERAPY | Age: 81
Discharge: HOME | End: 2021-01-19
Payer: MEDICARE

## 2021-01-19 VITALS
SYSTOLIC BLOOD PRESSURE: 128 MMHG | TEMPERATURE: 97.7 F | DIASTOLIC BLOOD PRESSURE: 49 MMHG | RESPIRATION RATE: 16 BRPM | HEART RATE: 84 BPM

## 2021-01-19 DIAGNOSIS — D63.1 ANEMIA OF CHRONIC RENAL FAILURE, STAGE 3A (HCC): Primary | ICD-10-CM

## 2021-01-19 DIAGNOSIS — N18.31 ANEMIA OF CHRONIC RENAL FAILURE, STAGE 3A (HCC): Primary | ICD-10-CM

## 2021-01-19 PROCEDURE — 6360000002 HC RX W HCPCS: Performed by: INTERNAL MEDICINE

## 2021-01-19 PROCEDURE — 96372 THER/PROPH/DIAG INJ SC/IM: CPT

## 2021-01-19 RX ADMIN — DARBEPOETIN ALFA 100 MCG: 100 INJECTION, SOLUTION INTRAVENOUS; SUBCUTANEOUS at 14:08

## 2021-01-21 ENCOUNTER — HOSPITAL ENCOUNTER (OUTPATIENT)
Dept: LAB | Age: 81
Setting detail: SPECIMEN
Discharge: HOME | End: 2021-01-21
Payer: MEDICARE

## 2021-01-21 PROCEDURE — U0005 INFEC AGEN DETEC AMPLI PROBE: HCPCS

## 2021-01-21 PROCEDURE — C9803 HOPD COVID-19 SPEC COLLECT: HCPCS

## 2021-01-21 PROCEDURE — U0003 INFECTIOUS AGENT DETECTION BY NUCLEIC ACID (DNA OR RNA); SEVERE ACUTE RESPIRATORY SYNDROME CORONAVIRUS 2 (SARS-COV-2) (CORONAVIRUS DISEASE [COVID-19]), AMPLIFIED PROBE TECHNIQUE, MAKING USE OF HIGH THROUGHPUT TECHNOLOGIES AS DESCRIBED BY CMS-2020-01-R: HCPCS

## 2021-01-22 LAB
SARS-COV-2, RAPID: NORMAL
SARS-COV-2: NORMAL
SARS-COV-2: NOT DETECTED
SOURCE: NORMAL

## 2021-01-26 ENCOUNTER — TELEPHONE (OUTPATIENT)
Dept: ONCOLOGY | Age: 81
End: 2021-01-26

## 2021-01-26 NOTE — TELEPHONE ENCOUNTER
Called and spoke with patient's  Suha Esposito due to receiving a call from [facility]. [address] coordinator for IR about patient running fevers at night.  said he called [facility] because patient is going to have biopsy by IR tomorrow. She has been running fevers at night for several days, last night it was 102, as she has night sweats also but mostly after taking Tylenol for the fever which than goes down. She has no other symptoms that she has told him. Denies burning with urination, no cough.  decided he would call the surgeon office at [facility] and check with them. Told to call if needs arise.

## 2021-02-04 ENCOUNTER — OFFICE VISIT (OUTPATIENT)
Dept: ONCOLOGY | Age: 81
End: 2021-02-04
Payer: MEDICARE

## 2021-02-04 VITALS
RESPIRATION RATE: 18 BRPM | WEIGHT: 104 LBS | HEART RATE: 72 BPM | BODY MASS INDEX: 17.33 KG/M2 | TEMPERATURE: 97.8 F | HEIGHT: 65 IN | SYSTOLIC BLOOD PRESSURE: 124 MMHG | DIASTOLIC BLOOD PRESSURE: 48 MMHG

## 2021-02-04 DIAGNOSIS — N18.31 ANEMIA OF CHRONIC RENAL FAILURE, STAGE 3A (HCC): ICD-10-CM

## 2021-02-04 DIAGNOSIS — D63.1 ANEMIA OF CHRONIC RENAL FAILURE, STAGE 3A (HCC): ICD-10-CM

## 2021-02-04 DIAGNOSIS — C64.2 RENAL CELL CARCINOMA OF LEFT KIDNEY (HCC): ICD-10-CM

## 2021-02-04 DIAGNOSIS — N28.89 RENAL MASS, LEFT: Primary | ICD-10-CM

## 2021-02-04 PROCEDURE — 99211 OFF/OP EST MAY X REQ PHY/QHP: CPT | Performed by: INTERNAL MEDICINE

## 2021-02-04 PROCEDURE — G8400 PT W/DXA NO RESULTS DOC: HCPCS | Performed by: INTERNAL MEDICINE

## 2021-02-04 PROCEDURE — 99215 OFFICE O/P EST HI 40 MIN: CPT | Performed by: INTERNAL MEDICINE

## 2021-02-04 PROCEDURE — G8484 FLU IMMUNIZE NO ADMIN: HCPCS | Performed by: INTERNAL MEDICINE

## 2021-02-04 PROCEDURE — G8419 CALC BMI OUT NRM PARAM NOF/U: HCPCS | Performed by: INTERNAL MEDICINE

## 2021-02-04 PROCEDURE — G8427 DOCREV CUR MEDS BY ELIG CLIN: HCPCS | Performed by: INTERNAL MEDICINE

## 2021-02-04 PROCEDURE — 1036F TOBACCO NON-USER: CPT | Performed by: INTERNAL MEDICINE

## 2021-02-04 PROCEDURE — 1123F ACP DISCUSS/DSCN MKR DOCD: CPT | Performed by: INTERNAL MEDICINE

## 2021-02-04 PROCEDURE — 4040F PNEUMOC VAC/ADMIN/RCVD: CPT | Performed by: INTERNAL MEDICINE

## 2021-02-04 PROCEDURE — 1090F PRES/ABSN URINE INCON ASSESS: CPT | Performed by: INTERNAL MEDICINE

## 2021-02-04 RX ORDER — ACETAMINOPHEN 325 MG/1
325 TABLET ORAL PRN
COMMUNITY
End: 2021-03-01

## 2021-02-04 NOTE — PROGRESS NOTES
_           Chief Complaint   Patient presents with    Follow-up     anemia/renal mass, left Patient had a transfusion yesterday at [facility] Anorexia     Patient family states theywere told that the tumor is pushing onto er stomach and it is not allowing her to take in very much nutrition and she states that it feels like its going to come up if too much taken in    Weight Loss     5 lbs since January     DIAGNOSIS:       1. Chronic microcytic anemia due to renal insufficiency and nutritional issues. As well as anemia of malignancy  2. History of CVA with left-sided weakness. Resolving. 3. Further work-up showed left kidney mass, likely renal cell carcinoma  4. Multiple lung lesions not amenable to biopsy and too small. But suspicious for metastatic disease  CURRENT THERAPY:         1. For anemia, started on Aranesp  2. Sent for evaluation for surgical intervention for the renal mass but she was not thought to be a surgical candidate  3.  Plan systemic therapy likely with Keytruda plus minus axitinib depending on tolerance    BRIEF CASE HISTORY: Ms. Neena Toney is a very pleasant 79 y.o. female with history of multiple co morbidities as listed. The patient is referred for iron deficiency anemia. Patient was doing very well over very long duration with no underlying medical problems. She had stroke in February 2018 with left-sided weakness. She has significant improvement in her power and function since then. She was maintained on aspirin and vitamins since that time. The patient's last evaluation with her PCP showed severe anemia. Further work-up showed very low iron level with elevated ferritin. Patient does not have any major symptoms. She has increasing weakness and fatigue since January of this year. She has no headaches or dizziness. No palpitation. No shortness of breath. Patient denies any active bleeding. No melena or hematochezia. No hematemesis. No vaginal bleeding. No hematuria. She had stool guaiac test which was negative. She was started on oral iron on October 16 and it is tolerated fairly well. Mild constipation. No other side effects. Patient denies smoking or alcohol drinking. . The patient was seen and started on Aranesp. Iron studies were adequate with elevated ferritin. Looking at her symptoms, I was concerned about her weight loss and progressive fatigue. Out of proportion to her anemia. CT scan was done and showed unfortunately the large left kidney mass highly suggestive of renal cell carcinoma. In addition multiple pulmonary nodules were seen all in the subcentimeter except 1 lesion in right upper lobe measuring 1.4 cm. That was felt not to be amenable for biopsy.    patient went to [facility] for consideration of nephrectomy, they felt she is not best candidate for surgery and recommended systemic therapy    INTERIM HISTORY:   Patient seen for follow-up , accompanied by her family · Dermatologic: No skin rashes or pruritus. No skin lesions or discolorations. · Psychiatric: No depression, anxiety, or stress or signs of schizophrenia. No change in mood or affect. · Hematologic: No history of bleeding tendency. No bruises or ecchymosis. No history of clotting problems. · Infectious disease: No fever, chills or frequent infections. · Endocrine: No polydipsia or polyuria. No temperature intolerance. · Neurologic: History of stroke with left-sided weakness resolving. · Allergic/Immunologic: No nasal congestion or hives. No repeated infections. PHYSICAL EXAM:  The patient is not in acute distress. Vital signs: Blood pressure (!) 124/48, pulse 72, temperature 97.8 °F (36.6 °C), temperature source Temporal, resp. rate 18, height 5' 5\" (1.651 m), weight 104 lb (47.2 kg), not currently breastfeeding.      General appearance -ill appearing cachectic lady  Mental status - good mood, alert and oriented  Eyes - pupils equal and reactive, extraocular eye movements intact  Ears - bilateral TM's and external ear canals normal  Nose - normal and patent, no erythema, discharge or polyps  Mouth - mucous membranes moist, pharynx normal without lesions  Neck - supple, no significant adenopathy  Lymphatics - no palpable lymphadenopathy, no hepatosplenomegaly  Chest - clear to auscultation, no wheezes, rales or rhonchi, symmetric air entry  Heart - normal rate, regular rhythm, normal S1, S2, no murmurs, rubs, clicks or gallops  Abdomen - soft, nontender, nondistended, no masses or organomegaly  Neurological - alert, oriented, normal speech, no focal findings or movement disorder noted  Musculoskeletal - no joint tenderness, deformity or swelling  Extremities - peripheral pulses normal, no pedal edema, no clubbing or cyanosis  Skin -skin rash on her face    Review of Diagnostic data:   Lab Results   Component Value Date    WBC 8.4 01/18/2021    HGB 7.9 (L) 01/18/2021    HCT 27.2 (L) 01/18/2021 MCV 76.8 (L) 01/18/2021     (H) 01/18/2021       Chemistry        Component Value Date/Time     (L) 01/18/2021 1240    K 4.0 01/18/2021 1240    CL 92 (L) 01/18/2021 1240    CO2 23 01/18/2021 1240    BUN 21 01/18/2021 1240    CREATININE 0.83 01/18/2021 1240        Component Value Date/Time    CALCIUM 9.0 01/18/2021 1240    ALKPHOS 171 (H) 01/18/2021 1240    AST 35 (H) 01/18/2021 1240    ALT 22 01/18/2021 1240    BILITOT 0.44 01/18/2021 1240        Lab Results   Component Value Date    IRON 18 (L) 11/17/2020    TIBC 155 (L) 11/17/2020    FERRITIN 1,139 (H) 11/17/2020     CT scan  Impression   Chest:       1. Multiple pulmonary nodules with the largest measuring 1.4 x 0.9 cm in the   right upper lobe.  Additional pulmonary nodules measuring 3-4 mm.  Findings   concerning for pulmonary metastatic disease given the findings in the   abdomen.  Mild emphysema.  Old granulomatous disease. 2. Respiratory motion and mild dependent atelectasis in both lungs. 3. Coronary artery disease.  Atheromatous plaque and atherosclerotic   calcification of the aorta. Abdomen/pelvis:       1. Findings concerning for a large centrally necrotic renal mass likely renal   cell carcinoma involving the inferior half of the left kidney with mass   effect upon the adjacent anatomy including the small bowel loops.  This   measures 11.5 x 9.5 x 11.1 cm in greatest dimensions.  Collateralization of   vessels in the left-sided perinephric space. 2. Appearance of the urinary bladder can be seen with cystitis.  Please   correlate with urinalysis. 3. Fatty liver. 4. Atherosclerotic calcification of the abdominal aorta and branch   vasculature. 5. Probable adrenal hyperplasia. 6. Old granulomatous disease. 7. 7 mm upper pole left renal cyst.       IMPRESSION:   1.  Microcytic anemia multifactorial, likely anemia of malignancy and anemia of renal disease as well as anemia of chronic illness 2. History of CVA with left-sided weakness. Resolving. 3. Left-sided renal cell carcinoma  4. Multiple lung lesions concerning for metastatic disease    PLAN:   The patient verbalized understanding of her situation. She wants to proceed with therapy as soon as possible  Without orders for [facility] but will plan to add axitinib depending on tolerance  And concerned about VEGF inhibitors because of her previous stroke but we will evaluate her depending on response  She was briefly considered a biopsy of the left kidney,  the problem is that the lung lesions are too small or not amenable for biopsy because of location, and the left renal mass is very high risk for bleeding. We decided to proceed with systemic therapy  We will see her back in the next few weeks  [facility] Head MD Tisha  Hematologist/Medical [id number] [facility] hematology oncology physicians        This note is created with the assistance of a speech recognition program.  While intending to generate a document that actually reflects the content of the visit, the document can still have some errors including those of syntax and sound a like substitutions which may escape proof reading. It such instances, actual meaning can be extrapolated by contextual diversion.

## 2021-02-04 NOTE — PATIENT INSTRUCTIONS
Please see orders for [address] [place] [address] to bring her back with Keytruda and Aranesp whenever approved  Need labs on treatment day  We will see her on day 1

## 2021-02-05 DIAGNOSIS — R63.4 WEIGHT LOSS: ICD-10-CM

## 2021-02-05 DIAGNOSIS — C64.2 RENAL CELL CARCINOMA OF LEFT KIDNEY (HCC): ICD-10-CM

## 2021-02-05 DIAGNOSIS — N18.31 ANEMIA OF CHRONIC RENAL FAILURE, STAGE 3A (HCC): Primary | ICD-10-CM

## 2021-02-05 DIAGNOSIS — D63.1 ANEMIA OF CHRONIC RENAL FAILURE, STAGE 3A (HCC): Primary | ICD-10-CM

## 2021-02-08 ENCOUNTER — TELEPHONE (OUTPATIENT)
Dept: ONCOLOGY | Age: 81
End: 2021-02-08

## 2021-02-08 ENCOUNTER — HOSPITAL ENCOUNTER (OUTPATIENT)
Dept: INFUSION THERAPY | Age: 81
Discharge: HOME | End: 2021-02-08
Payer: MEDICARE

## 2021-02-08 VITALS
WEIGHT: 102.4 LBS | BODY MASS INDEX: 16.07 KG/M2 | SYSTOLIC BLOOD PRESSURE: 150 MMHG | TEMPERATURE: 97.7 F | HEIGHT: 67 IN | DIASTOLIC BLOOD PRESSURE: 67 MMHG | HEART RATE: 76 BPM | RESPIRATION RATE: 16 BRPM

## 2021-02-08 DIAGNOSIS — C64.2 RENAL CELL CARCINOMA OF LEFT KIDNEY (HCC): ICD-10-CM

## 2021-02-08 DIAGNOSIS — R63.4 WEIGHT LOSS: ICD-10-CM

## 2021-02-08 DIAGNOSIS — D63.1 ANEMIA OF CHRONIC RENAL FAILURE, STAGE 3A (HCC): Primary | ICD-10-CM

## 2021-02-08 DIAGNOSIS — N18.31 ANEMIA OF CHRONIC RENAL FAILURE, STAGE 3A (HCC): Primary | ICD-10-CM

## 2021-02-08 LAB
ABSOLUTE EOS #: <0.03 K/UL (ref 0–0.44)
ABSOLUTE IMMATURE GRANULOCYTE: 0.05 K/UL (ref 0–0.3)
ABSOLUTE LYMPH #: 1.04 K/UL (ref 1.1–3.7)
ABSOLUTE MONO #: 0.51 K/UL (ref 0.1–1.2)
ALBUMIN SERPL-MCNC: 2.6 G/DL (ref 3.5–5.2)
ALBUMIN/GLOBULIN RATIO: 0.5 (ref 1–2.5)
ALP BLD-CCNC: 173 U/L (ref 35–104)
ALT SERPL-CCNC: 20 U/L (ref 5–33)
AMYLASE: 24 U/L (ref 28–100)
ANION GAP SERPL CALCULATED.3IONS-SCNC: 11 MMOL/L (ref 9–17)
AST SERPL-CCNC: 18 U/L
BASOPHILS # BLD: 1 % (ref 0–2)
BASOPHILS ABSOLUTE: 0.04 K/UL (ref 0–0.2)
BILIRUB SERPL-MCNC: 0.72 MG/DL (ref 0.3–1.2)
BUN BLDV-MCNC: 18 MG/DL (ref 8–23)
BUN/CREAT BLD: 21 (ref 9–20)
CALCIUM SERPL-MCNC: 8.7 MG/DL (ref 8.6–10.4)
CHLORIDE BLD-SCNC: 91 MMOL/L (ref 98–107)
CO2: 23 MMOL/L (ref 20–31)
CORTISOL COLLECTION INFO: ABNORMAL
CORTISOL: 27.2 UG/DL (ref 2.7–18.4)
CREAT SERPL-MCNC: 0.87 MG/DL (ref 0.5–0.9)
DIFFERENTIAL TYPE: ABNORMAL
EOSINOPHILS RELATIVE PERCENT: 0 % (ref 1–4)
GFR AFRICAN AMERICAN: >60 ML/MIN
GFR NON-AFRICAN AMERICAN: >60 ML/MIN
GFR SERPL CREATININE-BSD FRML MDRD: ABNORMAL ML/MIN/{1.73_M2}
GFR SERPL CREATININE-BSD FRML MDRD: ABNORMAL ML/MIN/{1.73_M2}
GLUCOSE BLD-MCNC: 148 MG/DL (ref 70–99)
HCT VFR BLD CALC: 30 % (ref 36.3–47.1)
HEMOGLOBIN: 8.7 G/DL (ref 11.9–15.1)
IMMATURE GRANULOCYTES: 1 %
LIPASE: 17 U/L (ref 13–60)
LYMPHOCYTES # BLD: 16 % (ref 24–43)
MCH RBC QN AUTO: 22.7 PG (ref 25.2–33.5)
MCHC RBC AUTO-ENTMCNC: 29 G/DL (ref 28.4–34.8)
MCV RBC AUTO: 78.1 FL (ref 82.6–102.9)
MONOCYTES # BLD: 8 % (ref 3–12)
NRBC AUTOMATED: 0 PER 100 WBC
PDW BLD-RTO: 18.5 % (ref 11.8–14.4)
PLATELET # BLD: 390 K/UL (ref 138–453)
PLATELET ESTIMATE: ABNORMAL
PMV BLD AUTO: 9.8 FL (ref 8.1–13.5)
POTASSIUM SERPL-SCNC: 4 MMOL/L (ref 3.7–5.3)
RBC # BLD: 3.84 M/UL (ref 3.95–5.11)
RBC # BLD: ABNORMAL 10*6/UL
SEG NEUTROPHILS: 74 % (ref 36–65)
SEGMENTED NEUTROPHILS ABSOLUTE COUNT: 4.88 K/UL (ref 1.5–8.1)
SODIUM BLD-SCNC: 125 MMOL/L (ref 135–144)
TOTAL PROTEIN: 7.9 G/DL (ref 6.4–8.3)
TSH SERPL DL<=0.05 MIU/L-ACNC: 1.61 MIU/L (ref 0.3–5)
WBC # BLD: 6.5 K/UL (ref 3.5–11.3)
WBC # BLD: ABNORMAL 10*3/UL

## 2021-02-08 PROCEDURE — 96417 CHEMO IV INFUS EACH ADDL SEQ: CPT

## 2021-02-08 PROCEDURE — 85025 COMPLETE CBC W/AUTO DIFF WBC: CPT

## 2021-02-08 PROCEDURE — 6360000002 HC RX W HCPCS: Performed by: INTERNAL MEDICINE

## 2021-02-08 PROCEDURE — 83690 ASSAY OF LIPASE: CPT

## 2021-02-08 PROCEDURE — 2580000003 HC RX 258: Performed by: INTERNAL MEDICINE

## 2021-02-08 PROCEDURE — 80053 COMPREHEN METABOLIC PANEL: CPT

## 2021-02-08 PROCEDURE — 96413 CHEMO IV INFUSION 1 HR: CPT

## 2021-02-08 PROCEDURE — 82533 TOTAL CORTISOL: CPT

## 2021-02-08 PROCEDURE — 82150 ASSAY OF AMYLASE: CPT

## 2021-02-08 PROCEDURE — 84443 ASSAY THYROID STIM HORMONE: CPT

## 2021-02-08 RX ORDER — SODIUM CHLORIDE 0.9 % (FLUSH) 0.9 %
5 SYRINGE (ML) INJECTION PRN
Status: CANCELLED | OUTPATIENT
Start: 2021-02-08

## 2021-02-08 RX ORDER — SODIUM CHLORIDE 9 MG/ML
20 INJECTION, SOLUTION INTRAVENOUS ONCE
Status: CANCELLED | OUTPATIENT
Start: 2021-03-01 | End: 2021-03-01

## 2021-02-08 RX ORDER — SODIUM CHLORIDE 0.9 % (FLUSH) 0.9 %
5 SYRINGE (ML) INJECTION PRN
Status: CANCELLED | OUTPATIENT
Start: 2021-03-29

## 2021-02-08 RX ORDER — MEPERIDINE HYDROCHLORIDE 50 MG/ML
12.5 INJECTION INTRAMUSCULAR; INTRAVENOUS; SUBCUTANEOUS ONCE
Status: CANCELLED | OUTPATIENT
Start: 2021-03-29 | End: 2021-03-22

## 2021-02-08 RX ORDER — SODIUM CHLORIDE 9 MG/ML
INJECTION, SOLUTION INTRAVENOUS CONTINUOUS
Status: CANCELLED | OUTPATIENT
Start: 2021-03-01

## 2021-02-08 RX ORDER — HEPARIN SODIUM (PORCINE) LOCK FLUSH IV SOLN 100 UNIT/ML 100 UNIT/ML
500 SOLUTION INTRAVENOUS PRN
Status: CANCELLED | OUTPATIENT
Start: 2021-03-29

## 2021-02-08 RX ORDER — SODIUM CHLORIDE 0.9 % (FLUSH) 0.9 %
10 SYRINGE (ML) INJECTION PRN
Status: CANCELLED | OUTPATIENT
Start: 2021-03-22

## 2021-02-08 RX ORDER — METHYLPREDNISOLONE SODIUM SUCCINATE 125 MG/2ML
125 INJECTION, POWDER, LYOPHILIZED, FOR SOLUTION INTRAMUSCULAR; INTRAVENOUS ONCE
Status: CANCELLED | OUTPATIENT
Start: 2021-03-29 | End: 2021-03-22

## 2021-02-08 RX ORDER — MEPERIDINE HYDROCHLORIDE 25 MG/ML
12.5 INJECTION INTRAMUSCULAR; INTRAVENOUS; SUBCUTANEOUS ONCE
Status: CANCELLED | OUTPATIENT
Start: 2021-02-08 | End: 2021-02-17

## 2021-02-08 RX ORDER — HEPARIN SODIUM (PORCINE) LOCK FLUSH IV SOLN 100 UNIT/ML 100 UNIT/ML
500 SOLUTION INTRAVENOUS PRN
Status: CANCELLED | OUTPATIENT
Start: 2021-02-08

## 2021-02-08 RX ORDER — DIPHENHYDRAMINE HYDROCHLORIDE 50 MG/ML
50 INJECTION INTRAMUSCULAR; INTRAVENOUS ONCE
Status: CANCELLED | OUTPATIENT
Start: 2021-03-01 | End: 2021-03-01

## 2021-02-08 RX ORDER — METHYLPREDNISOLONE SODIUM SUCCINATE 125 MG/2ML
125 INJECTION, POWDER, LYOPHILIZED, FOR SOLUTION INTRAMUSCULAR; INTRAVENOUS ONCE
Status: CANCELLED | OUTPATIENT
Start: 2021-03-01 | End: 2021-03-01

## 2021-02-08 RX ORDER — EPINEPHRINE 1 MG/ML
0.3 INJECTION, SOLUTION, CONCENTRATE INTRAVENOUS PRN
Status: CANCELLED | OUTPATIENT
Start: 2021-02-08

## 2021-02-08 RX ORDER — SODIUM CHLORIDE 9 MG/ML
20 INJECTION, SOLUTION INTRAVENOUS ONCE
Status: CANCELLED | OUTPATIENT
Start: 2021-03-29 | End: 2021-03-22

## 2021-02-08 RX ORDER — MEPERIDINE HYDROCHLORIDE 50 MG/ML
12.5 INJECTION INTRAMUSCULAR; INTRAVENOUS; SUBCUTANEOUS ONCE
Status: CANCELLED | OUTPATIENT
Start: 2021-03-01 | End: 2021-03-01

## 2021-02-08 RX ORDER — DIPHENHYDRAMINE HYDROCHLORIDE 50 MG/ML
50 INJECTION INTRAMUSCULAR; INTRAVENOUS ONCE
Status: CANCELLED | OUTPATIENT
Start: 2021-03-29 | End: 2021-03-22

## 2021-02-08 RX ORDER — EPINEPHRINE 1 MG/ML
0.3 INJECTION, SOLUTION, CONCENTRATE INTRAVENOUS PRN
Status: CANCELLED | OUTPATIENT
Start: 2021-03-29

## 2021-02-08 RX ORDER — SODIUM CHLORIDE 0.9 % (FLUSH) 0.9 %
10 SYRINGE (ML) INJECTION PRN
Status: CANCELLED | OUTPATIENT
Start: 2021-03-01

## 2021-02-08 RX ORDER — SODIUM CHLORIDE 9 MG/ML
INJECTION, SOLUTION INTRAVENOUS CONTINUOUS
Status: CANCELLED | OUTPATIENT
Start: 2021-02-08

## 2021-02-08 RX ORDER — HEPARIN SODIUM (PORCINE) LOCK FLUSH IV SOLN 100 UNIT/ML 100 UNIT/ML
500 SOLUTION INTRAVENOUS PRN
Status: CANCELLED | OUTPATIENT
Start: 2021-03-01

## 2021-02-08 RX ORDER — DIPHENHYDRAMINE HYDROCHLORIDE 50 MG/ML
50 INJECTION INTRAMUSCULAR; INTRAVENOUS ONCE
Status: CANCELLED | OUTPATIENT
Start: 2021-02-08 | End: 2021-02-17

## 2021-02-08 RX ORDER — METHYLPREDNISOLONE SODIUM SUCCINATE 125 MG/2ML
125 INJECTION, POWDER, LYOPHILIZED, FOR SOLUTION INTRAMUSCULAR; INTRAVENOUS ONCE
Status: CANCELLED | OUTPATIENT
Start: 2021-02-08 | End: 2021-02-17

## 2021-02-08 RX ORDER — SODIUM CHLORIDE 0.9 % (FLUSH) 0.9 %
10 SYRINGE (ML) INJECTION PRN
Status: DISCONTINUED | OUTPATIENT
Start: 2021-02-08 | End: 2021-02-09 | Stop reason: HOSPADM

## 2021-02-08 RX ORDER — SODIUM CHLORIDE 9 MG/ML
20 INJECTION, SOLUTION INTRAVENOUS ONCE
Status: CANCELLED | OUTPATIENT
Start: 2021-02-08 | End: 2021-02-17

## 2021-02-08 RX ORDER — SODIUM CHLORIDE 0.9 % (FLUSH) 0.9 %
5 SYRINGE (ML) INJECTION PRN
Status: CANCELLED | OUTPATIENT
Start: 2021-03-01

## 2021-02-08 RX ORDER — SODIUM CHLORIDE 0.9 % (FLUSH) 0.9 %
10 SYRINGE (ML) INJECTION PRN
Status: CANCELLED | OUTPATIENT
Start: 2021-02-08

## 2021-02-08 RX ORDER — EPINEPHRINE 1 MG/ML
0.3 INJECTION, SOLUTION, CONCENTRATE INTRAVENOUS PRN
Status: CANCELLED | OUTPATIENT
Start: 2021-03-01

## 2021-02-08 RX ORDER — SODIUM CHLORIDE 9 MG/ML
20 INJECTION, SOLUTION INTRAVENOUS ONCE
Status: COMPLETED | OUTPATIENT
Start: 2021-02-08 | End: 2021-02-08

## 2021-02-08 RX ORDER — SODIUM CHLORIDE 9 MG/ML
INJECTION, SOLUTION INTRAVENOUS CONTINUOUS
Status: CANCELLED | OUTPATIENT
Start: 2021-03-29

## 2021-02-08 RX ADMIN — Medication 10 ML: at 11:10

## 2021-02-08 RX ADMIN — SODIUM CHLORIDE 200 MG: 9 INJECTION, SOLUTION INTRAVENOUS at 12:32

## 2021-02-08 RX ADMIN — SODIUM CHLORIDE 20 ML/HR: 9 INJECTION, SOLUTION INTRAVENOUS at 11:58

## 2021-02-08 RX ADMIN — DARBEPOETIN ALFA 100 MCG: 100 INJECTION, SOLUTION INTRAVENOUS; SUBCUTANEOUS at 12:36

## 2021-02-08 NOTE — TELEPHONE ENCOUNTER
Patient here at [place] for first treatment with [place] [facility].  and daughter with patient. Information on Keytruda given to patient. Information about Keytruda and immunotherapy was reviewed and discussed. ID cards were given to use if sees another provider for symptoms or goes to ER. Discussed difference between immunotherapy and chemotherapy and how possible side effects are treated. Given card with possible side effects of different organ systems as reference for condition changes. Contact information for center and physician given. Importance of contacting center or physician for changes in condition or symptoms was stressed. Treatment routine discussed. Importance of fluid intake, nutritional intake and activity was discussed. Patient currently is fairly inactive do to weakness. She complains of feeling full quickly when trying to eat and feels like vomiting if tries to force. Importance of eating small amounts frequently discussed. She has been using a nutritional drink with added protein but if drinks to much will not eat. Discussed need to try and increase activity and not sleep most of day. Aware of need to maintain precautions in relation to COVID. Sample of high protein ensure given to try. Family asked good questions and are very supportive. Comfort pack given. Distress survey discussed. Not interested in FACT information at this time. Will review information with visits.

## 2021-02-24 ENCOUNTER — OFFICE VISIT (OUTPATIENT)
Dept: ONCOLOGY | Age: 81
End: 2021-02-24
Payer: MEDICARE

## 2021-02-24 VITALS
BODY MASS INDEX: 15.74 KG/M2 | HEART RATE: 87 BPM | TEMPERATURE: 97.2 F | WEIGHT: 100.5 LBS | SYSTOLIC BLOOD PRESSURE: 125 MMHG | RESPIRATION RATE: 16 BRPM | DIASTOLIC BLOOD PRESSURE: 45 MMHG

## 2021-02-24 DIAGNOSIS — C64.2 RENAL CELL CARCINOMA OF LEFT KIDNEY (HCC): Primary | ICD-10-CM

## 2021-02-24 DIAGNOSIS — D50.9 MICROCYTIC ANEMIA: ICD-10-CM

## 2021-02-24 DIAGNOSIS — R63.4 WEIGHT LOSS: ICD-10-CM

## 2021-02-24 PROCEDURE — 1090F PRES/ABSN URINE INCON ASSESS: CPT | Performed by: INTERNAL MEDICINE

## 2021-02-24 PROCEDURE — 4040F PNEUMOC VAC/ADMIN/RCVD: CPT | Performed by: INTERNAL MEDICINE

## 2021-02-24 PROCEDURE — G8484 FLU IMMUNIZE NO ADMIN: HCPCS | Performed by: INTERNAL MEDICINE

## 2021-02-24 PROCEDURE — G8427 DOCREV CUR MEDS BY ELIG CLIN: HCPCS | Performed by: INTERNAL MEDICINE

## 2021-02-24 PROCEDURE — G8400 PT W/DXA NO RESULTS DOC: HCPCS | Performed by: INTERNAL MEDICINE

## 2021-02-24 PROCEDURE — 99211 OFF/OP EST MAY X REQ PHY/QHP: CPT

## 2021-02-24 PROCEDURE — G8419 CALC BMI OUT NRM PARAM NOF/U: HCPCS | Performed by: INTERNAL MEDICINE

## 2021-02-24 PROCEDURE — 1036F TOBACCO NON-USER: CPT | Performed by: INTERNAL MEDICINE

## 2021-02-24 PROCEDURE — 1123F ACP DISCUSS/DSCN MKR DOCD: CPT | Performed by: INTERNAL MEDICINE

## 2021-02-24 PROCEDURE — 99214 OFFICE O/P EST MOD 30 MIN: CPT | Performed by: INTERNAL MEDICINE

## 2021-02-24 NOTE — PROGRESS NOTES
_           Chief Complaint   Patient presents with    Follow-up     left renal mass     DIAGNOSIS:       1. Chronic microcytic anemia due to renal insufficiency and nutritional issues. As well as anemia of malignancy  2. History of CVA with left-sided weakness. Resolving. 3. Further work-up showed left kidney mass, likely renal cell carcinoma  4. Multiple lung lesions not amenable to biopsy and too small. But suspicious for metastatic disease  CURRENT THERAPY:         1. For anemia, started on Aranesp  2. Sent for evaluation for surgical intervention for the renal mass but she was not thought to be a surgical candidate  3. Plan systemic therapy likely with Keytruda plus minus axitinib depending on tolerance    BRIEF CASE HISTORY:      Ms. Erin Mike is a very pleasant 79 y.o. female with history of multiple co morbidities as listed. The patient is referred for iron deficiency anemia. Patient was doing very well over very long duration with no underlying medical problems. She had stroke in February 2018 with left-sided weakness. She has significant improvement in her power and function since then. She was maintained on aspirin and vitamins since that time. The patient's last evaluation with her PCP showed severe anemia. Further work-up showed very low iron level with elevated ferritin. Patient does not have any major symptoms. She has increasing weakness and fatigue since January of this year. She has no headaches or dizziness. No palpitation. No shortness of breath. Patient denies any active bleeding. No melena or hematochezia. No hematemesis. No vaginal bleeding. No hematuria. She had stool guaiac test which was negative. She was started on oral iron on October 16 and it is tolerated fairly well. Mild constipation. No other side effects. Patient denies smoking or alcohol drinking. . The patient was seen and started on Aranesp. Iron studies were adequate with elevated ferritin. Looking at her symptoms, I was concerned about her weight loss and progressive fatigue. Out of proportion to her anemia. CT scan was done and showed unfortunately the large left kidney mass highly suggestive of renal cell carcinoma. In addition multiple pulmonary nodules were seen all in the subcentimeter except 1 lesion in right upper lobe measuring 1.4 cm. That was felt not to be amenable for biopsy. patient went to [facility] for consideration of nephrectomy, they felt she is not best candidate for surgery and recommended systemic therapy  Discussed option and we decided to start the patient on Keytruda. We are considering adding Inlyta depending on tolerance and response. The patient has a previous stroke and that is why I am concerned for VEGF inhibitors  INTERIM HISTORY:   Patient seen for follow-up , accompanied by her family  She handled treatment fairly well. She has a grade 1 acneiform rash on her face she is using 1% hydrocortisone. She had that before we start immunotherapy and this has been stable. She also continues to have ongoing fevers. Infectious work-up has been repeatedly negative and we think it is a tumor fever. Overall condition has been stable in fact she has a little bit more energy and her appetite has improved somewhat. Family is very encouraging think she might be responding. PAST MEDICAL HISTORY: has a past medical history of B12 deficiency, CVA (cerebral vascular accident) ([address] CVD (cerebrovascular disease), and Hypertension. PAST SURGICAL HISTORY: has no past surgical history on file. CURRENT MEDICATIONS:  has a current medication list which includes the following prescription(s): pantoprazole, lisinopril, acetaminophen, vitamin d3, and vitamin b 12. ALLERGIES:  is allergic to mirtazapine and zoloft [sertraline].     FAMILY HISTORY: Negative for any eye movements intact  Ears - bilateral TM's and external ear canals normal  Nose - normal and patent, no erythema, discharge or polyps  Mouth - mucous membranes moist, pharynx normal without lesions  Neck - supple, no significant adenopathy  Lymphatics - no palpable lymphadenopathy, no hepatosplenomegaly  Chest - clear to auscultation, no wheezes, rales or rhonchi, symmetric air entry  Heart - normal rate, regular rhythm, normal S1, S2, no murmurs, rubs, clicks or gallops  Abdomen - soft, nontender, nondistended, no masses or organomegaly  Neurological - alert, oriented, normal speech, no focal findings or movement disorder noted  Musculoskeletal - no joint tenderness, deformity or swelling  Extremities - peripheral pulses normal, no pedal edema, no clubbing or cyanosis  Skin -skin rash on her face    Review of Diagnostic data:   Lab Results   Component Value Date    WBC 6.5 02/08/2021    HGB 8.7 (L) 02/08/2021    HCT 30.0 (L) 02/08/2021    MCV 78.1 (L) 02/08/2021     02/08/2021       Chemistry        Component Value Date/Time     (L) 02/08/2021 1110    K 4.0 02/08/2021 1110    CL 91 (L) 02/08/2021 1110    CO2 23 02/08/2021 1110    BUN 18 02/08/2021 1110    CREATININE 0.87 02/08/2021 1110        Component Value Date/Time    CALCIUM 8.7 02/08/2021 1110    ALKPHOS 173 (H) 02/08/2021 1110    AST 18 02/08/2021 1110    ALT 20 02/08/2021 1110    BILITOT 0.72 02/08/2021 1110        Lab Results   Component Value Date    IRON 18 (L) 11/17/2020    TIBC 155 (L) 11/17/2020    FERRITIN 1,139 (H) 11/17/2020     CT scan  Impression   Chest:       1. Multiple pulmonary nodules with the largest measuring 1.4 x 0.9 cm in the   right upper lobe.  Additional pulmonary nodules measuring 3-4 mm.  Findings   concerning for pulmonary metastatic disease given the findings in the   abdomen.  Mild emphysema.  Old granulomatous disease. 2. Respiratory motion and mild dependent atelectasis in both lungs.    3. Coronary artery disease.  Atheromatous plaque and atherosclerotic   calcification of the aorta. Abdomen/pelvis:       1. Findings concerning for a large centrally necrotic renal mass likely renal   cell carcinoma involving the inferior half of the left kidney with mass   effect upon the adjacent anatomy including the small bowel loops.  This   measures 11.5 x 9.5 x 11.1 cm in greatest dimensions.  Collateralization of   vessels in the left-sided perinephric space. 2. Appearance of the urinary bladder can be seen with cystitis.  Please   correlate with urinalysis. 3. Fatty liver. 4. Atherosclerotic calcification of the abdominal aorta and branch   vasculature. 5. Probable adrenal hyperplasia. 6. Old granulomatous disease. 7. 7 mm upper pole left renal cyst.       IMPRESSION:   1. Microcytic anemia multifactorial, likely anemia of malignancy and anemia of renal disease as well as anemia of chronic illness   2. History of CVA with left-sided weakness. Resolving. 3. Left-sided renal cell carcinoma  4. Multiple lung lesions concerning for metastatic disease    PLAN:   The patient is ready to start her second cycle of immunotherapy, as mentioned above, the patient and family are Encouraged by her excellent tolerance to treatment and hoping that she is responding. I think she has tumor fever, will check her urine testing to exclude infection but clinically, this more consistent with tumor fever. I asked him to keep using Tylenol as needed. We will evaluate her after 4 cycles of therapy to see if she is responding. Depending on tolerance and response, I might consider adding Inlyta to her treatment.   Aleyda Giles MD  Hematologist/Medical [id number] [facility] hematology oncology physicians        This note is created with the assistance of a speech recognition program.  While intending to generate a document that actually reflects the content of the visit, the document can still have some errors including those of

## 2021-03-01 ENCOUNTER — HOSPITAL ENCOUNTER (OUTPATIENT)
Dept: INFUSION THERAPY | Age: 81
Discharge: HOME | End: 2021-03-01
Payer: MEDICARE

## 2021-03-01 VITALS
HEIGHT: 67 IN | RESPIRATION RATE: 16 BRPM | DIASTOLIC BLOOD PRESSURE: 52 MMHG | TEMPERATURE: 98 F | HEART RATE: 78 BPM | SYSTOLIC BLOOD PRESSURE: 105 MMHG | BODY MASS INDEX: 15.74 KG/M2

## 2021-03-01 VITALS
TEMPERATURE: 98.3 F | HEART RATE: 71 BPM | RESPIRATION RATE: 16 BRPM | SYSTOLIC BLOOD PRESSURE: 125 MMHG | DIASTOLIC BLOOD PRESSURE: 51 MMHG

## 2021-03-01 DIAGNOSIS — D63.1 ANEMIA OF CHRONIC RENAL FAILURE, STAGE 3A (HCC): ICD-10-CM

## 2021-03-01 DIAGNOSIS — D63.1 ANEMIA OF CHRONIC RENAL FAILURE, STAGE 3A (HCC): Primary | ICD-10-CM

## 2021-03-01 DIAGNOSIS — N18.31 ANEMIA OF CHRONIC RENAL FAILURE, STAGE 3A (HCC): Primary | ICD-10-CM

## 2021-03-01 DIAGNOSIS — R63.4 WEIGHT LOSS: ICD-10-CM

## 2021-03-01 DIAGNOSIS — N18.31 ANEMIA OF CHRONIC RENAL FAILURE, STAGE 3A (HCC): ICD-10-CM

## 2021-03-01 DIAGNOSIS — C64.2 RENAL CELL CARCINOMA OF LEFT KIDNEY (HCC): Primary | ICD-10-CM

## 2021-03-01 DIAGNOSIS — C64.2 RENAL CELL CARCINOMA OF LEFT KIDNEY (HCC): ICD-10-CM

## 2021-03-01 LAB
ABSOLUTE EOS #: 0 K/UL (ref 0–0.44)
ABSOLUTE IMMATURE GRANULOCYTE: 0.07 K/UL (ref 0–0.3)
ABSOLUTE LYMPH #: 0.81 K/UL (ref 1.1–3.7)
ABSOLUTE MONO #: 0.52 K/UL (ref 0.1–1.2)
ALBUMIN SERPL-MCNC: 2.3 G/DL (ref 3.5–5.2)
ALBUMIN/GLOBULIN RATIO: 0.5 (ref 1–2.5)
ALP BLD-CCNC: 169 U/L (ref 35–104)
ALT SERPL-CCNC: 20 U/L (ref 5–33)
AMYLASE: 25 U/L (ref 28–100)
ANION GAP SERPL CALCULATED.3IONS-SCNC: 10 MMOL/L (ref 9–17)
AST SERPL-CCNC: 23 U/L
BASOPHILS # BLD: 1 % (ref 0–2)
BASOPHILS ABSOLUTE: 0.07 K/UL (ref 0–0.2)
BILIRUB SERPL-MCNC: 0.82 MG/DL (ref 0.3–1.2)
BUN BLDV-MCNC: 26 MG/DL (ref 8–23)
BUN/CREAT BLD: 29 (ref 9–20)
CALCIUM SERPL-MCNC: 8.4 MG/DL (ref 8.6–10.4)
CHLORIDE BLD-SCNC: 93 MMOL/L (ref 98–107)
CO2: 24 MMOL/L (ref 20–31)
CORTISOL COLLECTION INFO: ABNORMAL
CORTISOL: 27.6 UG/DL (ref 2.7–18.4)
CREAT SERPL-MCNC: 0.91 MG/DL (ref 0.5–0.9)
DIFFERENTIAL TYPE: ABNORMAL
EOSINOPHILS RELATIVE PERCENT: 0 % (ref 1–4)
GFR AFRICAN AMERICAN: >60 ML/MIN
GFR NON-AFRICAN AMERICAN: 60 ML/MIN
GFR SERPL CREATININE-BSD FRML MDRD: ABNORMAL ML/MIN/{1.73_M2}
GFR SERPL CREATININE-BSD FRML MDRD: ABNORMAL ML/MIN/{1.73_M2}
GLUCOSE BLD-MCNC: 169 MG/DL (ref 70–99)
HCT VFR BLD CALC: 21.7 % (ref 36.3–47.1)
HEMOGLOBIN: 6.3 G/DL (ref 11.9–15.1)
IMMATURE GRANULOCYTES: 1 %
LIPASE: 20 U/L (ref 13–60)
LYMPHOCYTES # BLD: 11 % (ref 24–43)
MCH RBC QN AUTO: 22.1 PG (ref 25.2–33.5)
MCHC RBC AUTO-ENTMCNC: 29 G/DL (ref 28.4–34.8)
MCV RBC AUTO: 76.1 FL (ref 82.6–102.9)
MONOCYTES # BLD: 7 % (ref 3–12)
MORPHOLOGY: ABNORMAL
NRBC AUTOMATED: 0 PER 100 WBC
PDW BLD-RTO: 17.7 % (ref 11.8–14.4)
PLATELET # BLD: 353 K/UL (ref 138–453)
PLATELET ESTIMATE: ABNORMAL
PMV BLD AUTO: 9.8 FL (ref 8.1–13.5)
POTASSIUM SERPL-SCNC: 3.7 MMOL/L (ref 3.7–5.3)
RBC # BLD: 2.85 M/UL (ref 3.95–5.11)
RBC # BLD: ABNORMAL 10*6/UL
SEG NEUTROPHILS: 80 % (ref 36–65)
SEGMENTED NEUTROPHILS ABSOLUTE COUNT: 5.93 K/UL (ref 1.5–8.1)
SODIUM BLD-SCNC: 127 MMOL/L (ref 135–144)
TOTAL PROTEIN: 7.4 G/DL (ref 6.4–8.3)
TSH SERPL DL<=0.05 MIU/L-ACNC: 1.1 MIU/L (ref 0.3–5)
WBC # BLD: 7.4 K/UL (ref 3.5–11.3)
WBC # BLD: ABNORMAL 10*3/UL

## 2021-03-01 PROCEDURE — 2580000003 HC RX 258: Performed by: INTERNAL MEDICINE

## 2021-03-01 PROCEDURE — P9016 RBC LEUKOCYTES REDUCED: HCPCS

## 2021-03-01 PROCEDURE — 80053 COMPREHEN METABOLIC PANEL: CPT

## 2021-03-01 PROCEDURE — 86920 COMPATIBILITY TEST SPIN: CPT

## 2021-03-01 PROCEDURE — 86900 BLOOD TYPING SEROLOGIC ABO: CPT

## 2021-03-01 PROCEDURE — 96372 THER/PROPH/DIAG INJ SC/IM: CPT

## 2021-03-01 PROCEDURE — 86850 RBC ANTIBODY SCREEN: CPT

## 2021-03-01 PROCEDURE — 86901 BLOOD TYPING SEROLOGIC RH(D): CPT

## 2021-03-01 PROCEDURE — 6360000002 HC RX W HCPCS: Performed by: INTERNAL MEDICINE

## 2021-03-01 PROCEDURE — 96413 CHEMO IV INFUSION 1 HR: CPT

## 2021-03-01 PROCEDURE — 85025 COMPLETE CBC W/AUTO DIFF WBC: CPT

## 2021-03-01 PROCEDURE — 83690 ASSAY OF LIPASE: CPT

## 2021-03-01 PROCEDURE — 82533 TOTAL CORTISOL: CPT

## 2021-03-01 PROCEDURE — 84443 ASSAY THYROID STIM HORMONE: CPT

## 2021-03-01 PROCEDURE — 36430 TRANSFUSION BLD/BLD COMPNT: CPT

## 2021-03-01 PROCEDURE — 82150 ASSAY OF AMYLASE: CPT

## 2021-03-01 RX ORDER — DIPHENHYDRAMINE HYDROCHLORIDE 50 MG/ML
50 INJECTION INTRAMUSCULAR; INTRAVENOUS ONCE
Status: CANCELLED | OUTPATIENT
Start: 2021-03-01 | End: 2021-03-01

## 2021-03-01 RX ORDER — 0.9 % SODIUM CHLORIDE 0.9 %
250 INTRAVENOUS SOLUTION INTRAVENOUS ONCE
Status: CANCELLED | OUTPATIENT
Start: 2021-03-01 | End: 2021-03-01

## 2021-03-01 RX ORDER — HYDROXYZINE HYDROCHLORIDE 25 MG/1
25 TABLET, FILM COATED ORAL NIGHTLY
COMMUNITY

## 2021-03-01 RX ORDER — EPINEPHRINE 1 MG/ML
0.3 INJECTION, SOLUTION, CONCENTRATE INTRAVENOUS PRN
Status: CANCELLED | OUTPATIENT
Start: 2021-03-01

## 2021-03-01 RX ORDER — SODIUM CHLORIDE 0.9 % (FLUSH) 0.9 %
20 SYRINGE (ML) INJECTION PRN
Status: CANCELLED | OUTPATIENT
Start: 2021-03-01

## 2021-03-01 RX ORDER — SODIUM CHLORIDE 0.9 % (FLUSH) 0.9 %
20 SYRINGE (ML) INJECTION PRN
Status: DISCONTINUED | OUTPATIENT
Start: 2021-03-01 | End: 2021-03-02 | Stop reason: HOSPADM

## 2021-03-01 RX ORDER — METHYLPREDNISOLONE SODIUM SUCCINATE 125 MG/2ML
125 INJECTION, POWDER, LYOPHILIZED, FOR SOLUTION INTRAMUSCULAR; INTRAVENOUS ONCE
Status: CANCELLED | OUTPATIENT
Start: 2021-03-01 | End: 2021-03-01

## 2021-03-01 RX ORDER — PROCHLORPERAZINE MALEATE 10 MG
10 TABLET ORAL EVERY 6 HOURS PRN
Qty: 60 TABLET | Refills: 1 | Status: SHIPPED | OUTPATIENT
Start: 2021-03-01 | End: 2021-03-09 | Stop reason: ALTCHOICE

## 2021-03-01 RX ORDER — 0.9 % SODIUM CHLORIDE 0.9 %
250 INTRAVENOUS SOLUTION INTRAVENOUS ONCE
Status: COMPLETED | OUTPATIENT
Start: 2021-03-01 | End: 2021-03-01

## 2021-03-01 RX ORDER — SODIUM CHLORIDE 0.9 % (FLUSH) 0.9 %
10 SYRINGE (ML) INJECTION PRN
Status: DISCONTINUED | OUTPATIENT
Start: 2021-03-01 | End: 2021-03-02 | Stop reason: HOSPADM

## 2021-03-01 RX ORDER — SODIUM CHLORIDE 9 MG/ML
20 INJECTION, SOLUTION INTRAVENOUS ONCE
Status: COMPLETED | OUTPATIENT
Start: 2021-03-01 | End: 2021-03-01

## 2021-03-01 RX ORDER — SODIUM CHLORIDE 9 MG/ML
INJECTION, SOLUTION INTRAVENOUS CONTINUOUS
Status: CANCELLED | OUTPATIENT
Start: 2021-03-01

## 2021-03-01 RX ADMIN — SODIUM CHLORIDE 20 ML/HR: 9 INJECTION, SOLUTION INTRAVENOUS at 12:17

## 2021-03-01 RX ADMIN — SODIUM CHLORIDE 250 ML: 9 INJECTION, SOLUTION INTRAVENOUS at 14:03

## 2021-03-01 RX ADMIN — SODIUM CHLORIDE 200 MG: 9 INJECTION, SOLUTION INTRAVENOUS at 12:29

## 2021-03-01 RX ADMIN — SODIUM CHLORIDE, PRESERVATIVE FREE 10 ML: 5 INJECTION INTRAVENOUS at 13:11

## 2021-03-01 RX ADMIN — DARBEPOETIN ALFA 100 MCG: 100 INJECTION, SOLUTION INTRAVENOUS; SUBCUTANEOUS at 12:23

## 2021-03-01 RX ADMIN — SODIUM CHLORIDE, PRESERVATIVE FREE 10 ML: 5 INJECTION INTRAVENOUS at 13:58

## 2021-03-01 NOTE — PROGRESS NOTES
Dean Ag RN notified Dr. Colten Thorne of critical Hgb. See new order. Dr. Colten Thorne stated to continue with treatment as scheduled for today.

## 2021-03-03 ENCOUNTER — HOSPITAL ENCOUNTER (OUTPATIENT)
Age: 81
Discharge: HOME | DRG: 689 | End: 2021-03-05
Payer: MEDICARE

## 2021-03-03 ENCOUNTER — HOSPITAL ENCOUNTER (OUTPATIENT)
Dept: GENERAL RADIOLOGY | Age: 81
Discharge: HOME | DRG: 689 | End: 2021-03-05
Payer: MEDICARE

## 2021-03-03 ENCOUNTER — TELEPHONE (OUTPATIENT)
Dept: ONCOLOGY | Age: 81
End: 2021-03-03

## 2021-03-03 DIAGNOSIS — R13.10 DYSPHAGIA, UNSPECIFIED TYPE: ICD-10-CM

## 2021-03-03 DIAGNOSIS — R13.10 DIFFICULTY SWALLOWING LIQUIDS: ICD-10-CM

## 2021-03-03 DIAGNOSIS — R13.10 DYSPHAGIA, UNSPECIFIED TYPE: Primary | ICD-10-CM

## 2021-03-03 PROCEDURE — 71046 X-RAY EXAM CHEST 2 VIEWS: CPT

## 2021-03-03 NOTE — TELEPHONE ENCOUNTER
Patient daughter called initially to say that patient had a difficult time this AM with swallowing a milk shake. She had coughing spell and breathing sounded moist.  Currently she is doing fine sitting up. Has not had a fever today. Consistently has problems with eating but more so with drinking liquids. By the afternoon she does not want to swallow anything. Family concerned that she may have aspirated the fluid. Discussed with Dr Elvis Cool and will order CXR and swallowing studies. Called and spoke to . He will take patient for CXR today. Swallowing study was scheduled for 3/15 at 9am.  She will need to have COVID test at least 7 days prior and scheduled for 3/8 at 11.  and daughter told that if patient would start with fever or moist cough, go to ER for evaluation. Patient had CXR and did not show any new changes. Daughter called and made aware of results. Patient taking small amounts of soft solids. Daughter says patient seems to be scared to swallow. Discussed ways to get more nutrition. Discussed nutritional needs with Dr Casimiro Bryant and if patient does not eat/drink because of not swallowing than needs to be evaluated by being in hospital and getting fluids with nutritional value. Called and spoke with  about patient's needs. Check with PCP in AM or if problems before than go to ER for evaluation.

## 2021-03-05 ENCOUNTER — HOSPITAL ENCOUNTER (INPATIENT)
Age: 81
LOS: 3 days | Discharge: HOME | DRG: 689 | End: 2021-03-08
Attending: EMERGENCY MEDICINE | Admitting: FAMILY MEDICINE
Payer: MEDICARE

## 2021-03-05 ENCOUNTER — APPOINTMENT (OUTPATIENT)
Dept: GENERAL RADIOLOGY | Age: 81
DRG: 689 | End: 2021-03-05
Payer: MEDICARE

## 2021-03-05 DIAGNOSIS — E86.0 DEHYDRATION: Primary | ICD-10-CM

## 2021-03-05 DIAGNOSIS — C64.9 RENAL CELL CARCINOMA, UNSPECIFIED LATERALITY (HCC): ICD-10-CM

## 2021-03-05 DIAGNOSIS — N30.00 ACUTE CYSTITIS WITHOUT HEMATURIA: ICD-10-CM

## 2021-03-05 PROBLEM — T45.1X5A ANEMIA DUE TO CHEMOTHERAPY: Status: ACTIVE | Noted: 2021-03-05

## 2021-03-05 PROBLEM — D64.81 ANEMIA DUE TO CHEMOTHERAPY: Status: ACTIVE | Noted: 2021-03-05

## 2021-03-05 LAB
-: ABNORMAL
ABSOLUTE EOS #: 0 K/UL (ref 0–0.44)
ABSOLUTE IMMATURE GRANULOCYTE: 0 K/UL (ref 0–0.3)
ABSOLUTE LYMPH #: 0.9 K/UL (ref 1.1–3.7)
ABSOLUTE MONO #: 0.06 K/UL (ref 0.1–1.2)
ALBUMIN SERPL-MCNC: 2.1 G/DL (ref 3.5–5.2)
ALBUMIN/GLOBULIN RATIO: 0.4 (ref 1–2.5)
ALP BLD-CCNC: 157 U/L (ref 35–104)
ALT SERPL-CCNC: 15 U/L (ref 5–33)
AMORPHOUS: ABNORMAL
ANION GAP SERPL CALCULATED.3IONS-SCNC: 8 MMOL/L (ref 9–17)
AST SERPL-CCNC: 17 U/L
BACTERIA: ABNORMAL
BASOPHILS # BLD: 0 % (ref 0–2)
BASOPHILS ABSOLUTE: 0 K/UL (ref 0–0.2)
BILIRUB SERPL-MCNC: 0.77 MG/DL (ref 0.3–1.2)
BILIRUBIN URINE: ABNORMAL
BUN BLDV-MCNC: 23 MG/DL (ref 8–23)
BUN/CREAT BLD: 29 (ref 9–20)
CALCIUM SERPL-MCNC: 8.4 MG/DL (ref 8.6–10.4)
CASTS UA: ABNORMAL /LPF
CHLORIDE BLD-SCNC: 92 MMOL/L (ref 98–107)
CO2: 26 MMOL/L (ref 20–31)
COLOR: YELLOW
COMMENT UA: ABNORMAL
CREAT SERPL-MCNC: 0.8 MG/DL (ref 0.5–0.9)
CRYSTALS, UA: ABNORMAL /HPF
DIFFERENTIAL TYPE: ABNORMAL
EOSINOPHILS RELATIVE PERCENT: 0 % (ref 1–4)
EPITHELIAL CELLS UA: ABNORMAL /HPF (ref 0–25)
GFR AFRICAN AMERICAN: >60 ML/MIN
GFR NON-AFRICAN AMERICAN: >60 ML/MIN
GFR SERPL CREATININE-BSD FRML MDRD: ABNORMAL ML/MIN/{1.73_M2}
GFR SERPL CREATININE-BSD FRML MDRD: ABNORMAL ML/MIN/{1.73_M2}
GLUCOSE BLD-MCNC: 136 MG/DL (ref 70–99)
GLUCOSE URINE: NEGATIVE
HCT VFR BLD CALC: 29 % (ref 36.3–47.1)
HEMOGLOBIN: 8.6 G/DL (ref 11.9–15.1)
IMMATURE GRANULOCYTES: 0 %
KETONES, URINE: NEGATIVE
LEUKOCYTE ESTERASE, URINE: ABNORMAL
LYMPHOCYTES # BLD: 14 % (ref 24–43)
MCH RBC QN AUTO: 23.4 PG (ref 25.2–33.5)
MCHC RBC AUTO-ENTMCNC: 29.7 G/DL (ref 28.4–34.8)
MCV RBC AUTO: 79 FL (ref 82.6–102.9)
MONOCYTES # BLD: 1 % (ref 3–12)
MORPHOLOGY: NORMAL
MUCUS: ABNORMAL
NITRITE, URINE: NEGATIVE
NRBC AUTOMATED: 0 PER 100 WBC
OTHER OBSERVATIONS UA: ABNORMAL
PDW BLD-RTO: 19.1 % (ref 11.8–14.4)
PH UA: 7 (ref 5–9)
PLATELET # BLD: 281 K/UL (ref 138–453)
PLATELET ESTIMATE: ABNORMAL
PMV BLD AUTO: 9.6 FL (ref 8.1–13.5)
POTASSIUM SERPL-SCNC: 3.5 MMOL/L (ref 3.7–5.3)
PROTEIN UA: ABNORMAL
RBC # BLD: 3.67 M/UL (ref 3.95–5.11)
RBC # BLD: ABNORMAL 10*6/UL
RBC UA: ABNORMAL /HPF (ref 0–2)
RENAL EPITHELIAL, UA: ABNORMAL /HPF
SEG NEUTROPHILS: 85 % (ref 36–65)
SEGMENTED NEUTROPHILS ABSOLUTE COUNT: 5.44 K/UL (ref 1.5–8.1)
SODIUM BLD-SCNC: 126 MMOL/L (ref 135–144)
SPECIFIC GRAVITY UA: 1.02 (ref 1.01–1.02)
TOTAL PROTEIN: 7.3 G/DL (ref 6.4–8.3)
TRICHOMONAS: ABNORMAL
TURBIDITY: ABNORMAL
URINE HGB: ABNORMAL
UROBILINOGEN, URINE: NORMAL
WBC # BLD: 6.4 K/UL (ref 3.5–11.3)
WBC # BLD: ABNORMAL 10*3/UL
WBC UA: ABNORMAL /HPF (ref 0–5)
YEAST: ABNORMAL

## 2021-03-05 PROCEDURE — A4641 RADIOPHARM DX AGENT NOC: HCPCS | Performed by: EMERGENCY MEDICINE

## 2021-03-05 PROCEDURE — 96360 HYDRATION IV INFUSION INIT: CPT

## 2021-03-05 PROCEDURE — 99284 EMERGENCY DEPT VISIT MOD MDM: CPT

## 2021-03-05 PROCEDURE — 92611 MOTION FLUOROSCOPY/SWALLOW: CPT

## 2021-03-05 PROCEDURE — 96361 HYDRATE IV INFUSION ADD-ON: CPT

## 2021-03-05 PROCEDURE — 85025 COMPLETE CBC W/AUTO DIFF WBC: CPT

## 2021-03-05 PROCEDURE — 81001 URINALYSIS AUTO W/SCOPE: CPT

## 2021-03-05 PROCEDURE — 6360000002 HC RX W HCPCS: Performed by: EMERGENCY MEDICINE

## 2021-03-05 PROCEDURE — 6370000000 HC RX 637 (ALT 250 FOR IP): Performed by: FAMILY MEDICINE

## 2021-03-05 PROCEDURE — 74230 X-RAY XM SWLNG FUNCJ C+: CPT

## 2021-03-05 PROCEDURE — 6360000002 HC RX W HCPCS: Performed by: FAMILY MEDICINE

## 2021-03-05 PROCEDURE — 6360000004 HC RX CONTRAST MEDICATION: Performed by: EMERGENCY MEDICINE

## 2021-03-05 PROCEDURE — 94761 N-INVAS EAR/PLS OXIMETRY MLT: CPT

## 2021-03-05 PROCEDURE — 1200000000 HC SEMI PRIVATE

## 2021-03-05 PROCEDURE — 80053 COMPREHEN METABOLIC PANEL: CPT

## 2021-03-05 PROCEDURE — 2580000003 HC RX 258: Performed by: EMERGENCY MEDICINE

## 2021-03-05 PROCEDURE — 2580000003 HC RX 258: Performed by: FAMILY MEDICINE

## 2021-03-05 RX ORDER — PANTOPRAZOLE SODIUM 20 MG/1
20 TABLET, DELAYED RELEASE ORAL
Status: DISCONTINUED | OUTPATIENT
Start: 2021-03-06 | End: 2021-03-08 | Stop reason: HOSPADM

## 2021-03-05 RX ORDER — SODIUM CHLORIDE 9 MG/ML
INJECTION, SOLUTION INTRAVENOUS CONTINUOUS
Status: DISCONTINUED | OUTPATIENT
Start: 2021-03-05 | End: 2021-03-08 | Stop reason: HOSPADM

## 2021-03-05 RX ORDER — POLYETHYLENE GLYCOL 3350 17 G/17G
17 POWDER, FOR SOLUTION ORAL DAILY PRN
Status: DISCONTINUED | OUTPATIENT
Start: 2021-03-05 | End: 2021-03-08 | Stop reason: HOSPADM

## 2021-03-05 RX ORDER — SODIUM CHLORIDE 0.9 % (FLUSH) 0.9 %
10 SYRINGE (ML) INJECTION PRN
Status: DISCONTINUED | OUTPATIENT
Start: 2021-03-05 | End: 2021-03-08 | Stop reason: HOSPADM

## 2021-03-05 RX ORDER — SODIUM CHLORIDE 0.9 % (FLUSH) 0.9 %
10 SYRINGE (ML) INJECTION EVERY 12 HOURS SCHEDULED
Status: DISCONTINUED | OUTPATIENT
Start: 2021-03-05 | End: 2021-03-08 | Stop reason: HOSPADM

## 2021-03-05 RX ORDER — LANOLIN ALCOHOL/MO/W.PET/CERES
500 CREAM (GRAM) TOPICAL 2 TIMES DAILY
Status: DISCONTINUED | OUTPATIENT
Start: 2021-03-05 | End: 2021-03-08 | Stop reason: HOSPADM

## 2021-03-05 RX ORDER — ACETAMINOPHEN 325 MG/1
650 TABLET ORAL EVERY 6 HOURS PRN
Status: DISCONTINUED | OUTPATIENT
Start: 2021-03-05 | End: 2021-03-08 | Stop reason: HOSPADM

## 2021-03-05 RX ORDER — PROMETHAZINE HYDROCHLORIDE 25 MG/1
12.5 TABLET ORAL EVERY 6 HOURS PRN
Status: DISCONTINUED | OUTPATIENT
Start: 2021-03-05 | End: 2021-03-08 | Stop reason: HOSPADM

## 2021-03-05 RX ORDER — ACETAMINOPHEN 650 MG/1
650 SUPPOSITORY RECTAL EVERY 6 HOURS PRN
Status: DISCONTINUED | OUTPATIENT
Start: 2021-03-05 | End: 2021-03-08 | Stop reason: HOSPADM

## 2021-03-05 RX ORDER — LISINOPRIL 10 MG/1
10 TABLET ORAL 2 TIMES DAILY
Status: DISCONTINUED | OUTPATIENT
Start: 2021-03-05 | End: 2021-03-08 | Stop reason: HOSPADM

## 2021-03-05 RX ORDER — ONDANSETRON 2 MG/ML
4 INJECTION INTRAMUSCULAR; INTRAVENOUS EVERY 6 HOURS PRN
Status: DISCONTINUED | OUTPATIENT
Start: 2021-03-05 | End: 2021-03-08 | Stop reason: HOSPADM

## 2021-03-05 RX ORDER — PROCHLORPERAZINE MALEATE 5 MG/1
10 TABLET ORAL EVERY 6 HOURS PRN
Status: DISCONTINUED | OUTPATIENT
Start: 2021-03-05 | End: 2021-03-08 | Stop reason: HOSPADM

## 2021-03-05 RX ORDER — HYDROXYZINE HYDROCHLORIDE 25 MG/1
25 TABLET, FILM COATED ORAL NIGHTLY
Status: DISCONTINUED | OUTPATIENT
Start: 2021-03-05 | End: 2021-03-08 | Stop reason: HOSPADM

## 2021-03-05 RX ORDER — SODIUM CHLORIDE 9 MG/ML
150 INJECTION, SOLUTION INTRAVENOUS CONTINUOUS
Status: DISCONTINUED | OUTPATIENT
Start: 2021-03-05 | End: 2021-03-06

## 2021-03-05 RX ADMIN — WATER 1000 MG: 1 INJECTION INTRAMUSCULAR; INTRAVENOUS; SUBCUTANEOUS at 17:13

## 2021-03-05 RX ADMIN — LISINOPRIL 10 MG: 10 TABLET ORAL at 20:13

## 2021-03-05 RX ADMIN — SODIUM CHLORIDE 150 ML/HR: 9 INJECTION, SOLUTION INTRAVENOUS at 13:44

## 2021-03-05 RX ADMIN — SODIUM CHLORIDE: 9 INJECTION, SOLUTION INTRAVENOUS at 20:10

## 2021-03-05 RX ADMIN — HYDROXYZINE HYDROCHLORIDE 25 MG: 25 TABLET, FILM COATED ORAL at 20:13

## 2021-03-05 RX ADMIN — BARIUM SULFATE 355 ML: 0.6 SUSPENSION ORAL at 15:40

## 2021-03-05 RX ADMIN — CYANOCOBALAMIN TAB 1000 MCG 500 MCG: 1000 TAB at 20:13

## 2021-03-05 RX ADMIN — ENOXAPARIN SODIUM 40 MG: 40 INJECTION SUBCUTANEOUS at 20:12

## 2021-03-05 ASSESSMENT — ENCOUNTER SYMPTOMS
WHEEZING: 0
CHOKING: 1
ABDOMINAL PAIN: 0
SHORTNESS OF BREATH: 0
ABDOMINAL DISTENTION: 0
COUGH: 0

## 2021-03-05 NOTE — ED PROVIDER NOTES
left middle cerebral artery stenosis    CVD (cerebrovascular disease)     Hypertension          SURGICAL HISTORY     History reviewed. No pertinent surgical history.       CURRENT MEDICATIONS       Previous Medications    CHOLECALCIFEROL (VITAMIN D3) 125 MCG (5000 UT) TABS    Take 5,000 Units by mouth daily    CYANOCOBALAMIN (VITAMIN B 12) 500 MCG TABS    Take 1 tablet by mouth 2 times daily     HYDROXYZINE (ATARAX) 25 MG TABLET    Take 25 mg by mouth nightly    LISINOPRIL (PRINIVIL;ZESTRIL) 10 MG TABLET    Take 1 tablet by mouth 2 times daily    PANTOPRAZOLE (PROTONIX) 40 MG TABLET    TAKE 1 TABLET BY MOUTH EVERY DAY BEFORE BREAKFAST    PROCHLORPERAZINE (COMPAZINE) 10 MG TABLET    Take 1 tablet by mouth every 6 hours as needed (nausea)       ALLERGIES     Mirtazapine and Zoloft [sertraline]    FAMILY HISTORY       Family History   Problem Relation Age of Onset    Coronary Art Dis Maternal Cousin     Cancer Father         prostate    Cancer Paternal Grandfather         stomach          SOCIAL HISTORY       Social History     Socioeconomic History    Marital status:      Spouse name: None    Number of children: None    Years of education: None    Highest education level: None   Occupational History    None   Social Needs    Financial resource strain: None    Food insecurity     Worry: None     Inability: None    Transportation needs     Medical: None     Non-medical: None   Tobacco Use    Smoking status: Never Smoker    Smokeless tobacco: Never Used   Substance and Sexual Activity    Alcohol use: No    Drug use: No    Sexual activity: None   Lifestyle    Physical activity     Days per week: None     Minutes per session: None    Stress: None   Relationships    Social connections     Talks on phone: None     Gets together: None     Attends [employer] service: None     Active member of club or organization: None     Attends meetings of clubs or organizations: None     Relationship status: None    Intimate partner violence     Fear of current or ex partner: None     Emotionally abused: None     Physically abused: None     Forced sexual activity: None   Other Topics Concern    None   Social History Narrative    None       SCREENINGS    [place] Coma Scale  Eye Opening: Spontaneous  Best Verbal Response: Oriented  Best Motor Response: Obeys commands  [place] Coma Scale Score: 15        PHYSICAL EXAM  (up to 7 for level 4, 8 or more for level 5)     ED Triage Vitals   BP Temp Temp Source Pulse Resp SpO2 Height Weight   03/05/21 1201 03/05/21 1206 03/05/21 1206 03/05/21 1206 03/05/21 1206 03/05/21 1201 -- --   (!) 120/53 98 °F (36.7 °C) Tympanic 70 14 97 %         Physical Exam  Constitutional:       General: She is not in acute distress. Appearance: She is ill-appearing. HENT:      Head: Normocephalic and atraumatic. Mouth/Throat:      Mouth: Mucous membranes are dry. Eyes:      Extraocular Movements: Extraocular movements intact. Conjunctiva/sclera: Conjunctivae normal.      Pupils: Pupils are equal, round, and reactive to light. Neck:      Musculoskeletal: Normal range of motion and neck supple. Cardiovascular:      Rate and Rhythm: Normal rate and regular rhythm. Pulses: Normal pulses. Heart sounds: Normal heart sounds. Pulmonary:      Breath sounds: Normal breath sounds. Abdominal:      General: Abdomen is flat. Bowel sounds are normal.      Palpations: Abdomen is soft. Tenderness: There is no abdominal tenderness. Musculoskeletal: Normal range of motion. Skin:     General: Skin is warm. Neurological:      General: No focal deficit present. Mental Status: She is alert and oriented to person, place, and time. Mental status is at baseline. Psychiatric:      Comments: The patient defers to the family to answer any questions.          DIAGNOSTIC RESULTS     EKG: All EKG's are interpreted by the Emergency Department Physician whoeither signs or Co-signs this chart in the absence of a cardiologist.      RADIOLOGY:   Non-plain film images such as CT, Ultrasound and MRI are read by the radiologist. Plain radiographic images are visualized and preliminarily interpreted by the emergency physician     Interpretation per the Radiologist below, if available at the time of this note:    Fluoroscopy modified barium swallow with video   Final Result   No aspiration or significant residuals. Please see separate speech pathology report for full discussion of findings   and recommendations.                ED BEDSIDE ULTRASOUND:   Performed by ED Physician - none    LABS:  Labs Reviewed   CBC WITH AUTO DIFFERENTIAL - Abnormal; Notable for the following components:       Result Value    RBC 3.67 (*)     Hemoglobin 8.6 (*)     Hematocrit 29.0 (*)     MCV 79.0 (*)     MCH 23.4 (*)     RDW 19.1 (*)     Seg Neutrophils 85 (*)     Lymphocytes 14 (*)     Monocytes 1 (*)     Eosinophils % 0 (*)     Absolute Lymph # 0.90 (*)     Absolute Mono # 0.06 (*)     All other components within normal limits   COMPREHENSIVE METABOLIC PANEL - Abnormal; Notable for the following components:    Glucose 136 (*)     Bun/Cre Ratio 29 (*)     Calcium 8.4 (*)     Sodium 126 (*)     Potassium 3.5 (*)     Chloride 92 (*)     Anion Gap 8 (*)     Alkaline Phosphatase 157 (*)     Albumin 2.1 (*)     Albumin/Globulin Ratio 0.4 (*)     All other components within normal limits   URINALYSIS WITH MICROSCOPIC - Abnormal; Notable for the following components:    Turbidity UA CLOUDY (*)     Bilirubin Urine SMALL (*)     Urine Hgb 1+ (*)     Protein, UA TRACE (*)     Leukocyte Esterase, Urine MODERATE (*)     Bacteria, UA 4+ (*)     All other components within normal limits       EMERGENCY DEPARTMENT COURSE and DIFFERENTIAL DIAGNOSIS/MDM:   Vitals:    Vitals:    03/05/21 1345 03/05/21 1400 03/05/21 1415 03/05/21 1445   BP: (!) 114/42 (!) 122/38 (!) 117/43 (!) 126/44   Pulse:       Resp:       Temp: TempSrc:       SpO2:               MDM    CONSULTS:  None    PROCEDURES:  Unless otherwise noted below, none     Procedures    FINAL IMPRESSION      1. Dehydration    2. Acute cystitis without hematuria    3. Renal cell carcinoma, unspecified laterality [facility])          DISPOSITION/PLAN   DISPOSITION Decision To Admit 03/05/2021 04:44:38 PM      PATIENT REFERRED TO:  No follow-up provider specified.     DISCHARGE MEDICATIONS:  New Prescriptions    No medications on file              (Please note that portions ofthis note were completed with a voice recognition program.  Efforts were made to edit the dictations but occasionally words are mis-transcribed.)      Sharmin Barrera MD (electronically signed)  Attending Emergency Physician          Faizan Villa MD  03/05/21 1279

## 2021-03-05 NOTE — ED NOTES
Dr Nacho Joyner for Dr Fred Bello called at office.  Per office he is in the hospital. [facility] called, spoke with Breann Rivers she will give Dr Nacho Joyner a message to call Dr Naheed Simmons  03/05/21 [phone]

## 2021-03-05 NOTE — PROCEDURES
INSTRUMENTAL SWALLOW REPORT  MODIFIED BARIUM SWALLOW    NAME: Jono Ortiz   : 1940  MRN: [id number]       Date of Eval: 3/5/2021     Ordering Physician: Madiha Boone  Radiologist: Dr. Leela Rosario     Referring Diagnosis(es): Referring Diagnosis: Dysphagia    Past Medical History:  has a past medical history of B12 deficiency, CVA (cerebral vascular accident) [facility]), CVD (cerebrovascular disease), and Hypertension. Past Surgical History:  has no past surgical history on file. Current Diet Solid Consistency: Regular  Current Diet Liquid Consistency: Thin       Type of Study: Initial MBS     Patient Complaints/Reason for Referral:  Jono Ortiz was referred for a MBS to assess the efficiency of his/her swallow function, assess for aspiration, and to make recommendations regarding safe dietary consistencies, effective compensatory strategies, and safe eating environment. Patient complaints: pt and family report the following: current intake is soft foods with liquids but pt has had a significant decrease in po intake over the last 10 days. Daughter reports intermittent coughing with thin liquids from straw and intermittent regurgitation of foods. Daughter stated pt is afraid to swallow and afraid of choking. She reports she \"just feels off\" and doesnt want to eat. Family reports pt is receiving CA tx at this time and there are possible mets in her lungs. Pt has had significant weight loss (64 pounds) since 2018 and down 9 pounds since the start of . Behavior/Cognition/Vision/Hearing:  Behavior/Cognition: Alert; Cooperative  Vision: Within Functional Limits(WFL for MBS)  Hearing: Within functional limits(WFL for MBS)    Impressions:     Patient Position: Lateral     Consistencies Administered: Dysphagia Soft and Bite-Sized (Dysphagia III); Nectar straw; Thin straw;Dysphagia Pureed (Dysphagia I)    Compensatory Swallowing Strategies Attempted:  Alternate solids and liquids;Eat/Feed slowly; Small bites/sips;Upright as possible for all oral intake;Remain upright for 30-45 minutes after meals       Oral Phase: mild oral phase Dysphagia characterized by slow mastication of soft solid trials and premature entry of partial bolus into valleculae before swallow trigger. Pt demonstrated functional straw use, no oral stasis and no pocketing. There was noted functional A-P transit of thin, nectar and puree bolus trials. Pharyngeal: pt presents with WFL pharyngeal phase of swallow. Pt was noted to have trace penetration with large sequential drinks of thin liquids intermittently but with smaller drinks pt tolerated without any trace penetration. Pt has mild generalized weakness but demonstrated no aspiration, so stasis and a functional pharyngeal phse of swallow      Dysphagia Outcome Severity Scale: Level 6: Within functional limits/Modified independence  Penetration-Aspiration Scale (PAS): 2 - Material enters the airway, remains above the vocal folds, and is ejected from the airway    Recommended Diet:  Solid consistency: Dental Soft  Liquid consistency: Thin  Liquid administration via: Straw(as this is pt request)    Medication administration: PO    Safe Swallow Protocol:     Compensatory Swallowing Strategies: Alternate solids and liquids;Eat/Feed slowly;Upright as possible for all oral intake;Remain upright for 30-45 minutes after meals;Small bites/sips      Recommendations/Treatment  Requires SLP Intervention: No    Recommended Exercises:    Therapeutic Interventions: Patient/Family education    Referral To: GI(if regurgitation continues to observe esophageal phase)    Education: Images and recommendations were reviewed with pt and daughter following this exam.   Patient Education: extensive education completed with pt and daughter re: results of MBS and recommendations for soft diet with thin liquids via straw if desired.  Discussed use of small bites and drinks and attempting to keep a postive attitude before eating and drinking  Patient Education Response: Verbalizes understanding    Prognosis  Prognosis for safe diet advancement: good  Barriers to reach goals: age;fatigue;motivation      Oral Preparation / Oral Phase  Oral Phase: Impaired  Oral Phase - Major Contributing Deficits  Poor Mastication: Soft solid(mastication was slow but effective)  Premature Bolus Loss to Pharynx: Soft solid    Pharyngeal Phase  Pharyngeal Phase: WFL  Pharyngeal Phase - Major Contributing Deficits  Premature Spillage to Valleculae: Soft solid  Shallow Penetration During: Thin straw(intermittent)    Esophageal Phase  Esophageal Screen: [facility]    Therapy Time:   Individual   Time In 0300   Time Out 0330   Minutes 30      Pt presented with daughter from the ER secondary to difficulty swallowing. Daughter reports it has been getting worse the last 10 days and her mother has not been eating much. She feels her mother is now afraid to swallow. Pt participated well with MBS but did not want to eat or drink much but was willing when asked by SLP. Mary Munson Overall swallow is functional for diet of soft solids and thin liquids. mild oral phase Dysphagia characterized by slow mastication of soft solid trials and premature entry of partial bolus into valleculae before swallow trigger was noted. Pt demonstrated functional straw use, no oral stasis and no pocketing. There was noted functional A-P transit of thin, nectar and puree bolus trials. WFL pharyngeal phase of swallow. Pt was noted to have trace penetration with large sequential drinks of thin liquids intermittently but with smaller drinks pt tolerated without any trace penetration. Pt has mild generalized weakness but demonstrated no aspiration, so stasis and a functional pharyngeal phse of swallow. Rec small bites and drinks, upright for meals and 30 minutes afterwards. contast PCP if regurgitation continues.  See above note for further details from [address] M.S.,[facility], 3/5/2021, 4:13 PM

## 2021-03-05 NOTE — PROGRESS NOTES
Patient admitted to room 320 at this time with a UTI. Patient able to ambulate to bed with assistance, feels very weak and tired. Vitals obtained and writer reviewed navigator with the patients family members that are bedside at this time. Patient is oriented to situation, place, and person, but not time currently. Laying on right side and resting while reviewing navigator with patinet.

## 2021-03-06 PROBLEM — E87.6 HYPOKALEMIA: Status: ACTIVE | Noted: 2021-03-06

## 2021-03-06 PROBLEM — Z86.73 H/O: CVA (CEREBROVASCULAR ACCIDENT): Status: ACTIVE | Noted: 2021-03-06

## 2021-03-06 PROBLEM — E43 SEVERE PROTEIN-CALORIE MALNUTRITION (HCC): Status: ACTIVE | Noted: 2021-03-06

## 2021-03-06 LAB
ABSOLUTE EOS #: 0 K/UL (ref 0–0.44)
ABSOLUTE IMMATURE GRANULOCYTE: 0.06 K/UL (ref 0–0.3)
ABSOLUTE LYMPH #: 0.95 K/UL (ref 1.1–3.7)
ABSOLUTE MONO #: 0.44 K/UL (ref 0.1–1.2)
BASOPHILS # BLD: 1 % (ref 0–2)
BASOPHILS ABSOLUTE: 0.06 K/UL (ref 0–0.2)
DIFFERENTIAL TYPE: ABNORMAL
EOSINOPHILS RELATIVE PERCENT: 0 % (ref 1–4)
HCT VFR BLD CALC: 23.3 % (ref 36.3–47.1)
HCT VFR BLD CALC: 27.9 % (ref 36.3–47.1)
HEMOGLOBIN: 6.9 G/DL (ref 11.9–15.1)
HEMOGLOBIN: 8.4 G/DL (ref 11.9–15.1)
IMMATURE GRANULOCYTES: 1 %
LYMPHOCYTES # BLD: 15 % (ref 24–43)
MCH RBC QN AUTO: 23.5 PG (ref 25.2–33.5)
MCHC RBC AUTO-ENTMCNC: 29.6 G/DL (ref 28.4–34.8)
MCV RBC AUTO: 79.5 FL (ref 82.6–102.9)
MONOCYTES # BLD: 7 % (ref 3–12)
MORPHOLOGY: ABNORMAL
NRBC AUTOMATED: 0 PER 100 WBC
PDW BLD-RTO: 19 % (ref 11.8–14.4)
PLATELET # BLD: 240 K/UL (ref 138–453)
PLATELET ESTIMATE: ABNORMAL
PMV BLD AUTO: 9.7 FL (ref 8.1–13.5)
RBC # BLD: 2.93 M/UL (ref 3.95–5.11)
RBC # BLD: ABNORMAL 10*6/UL
SEG NEUTROPHILS: 76 % (ref 36–65)
SEGMENTED NEUTROPHILS ABSOLUTE COUNT: 4.79 K/UL (ref 1.5–8.1)
WBC # BLD: 6.3 K/UL (ref 3.5–11.3)
WBC # BLD: ABNORMAL 10*3/UL

## 2021-03-06 PROCEDURE — 86900 BLOOD TYPING SEROLOGIC ABO: CPT

## 2021-03-06 PROCEDURE — 85014 HEMATOCRIT: CPT

## 2021-03-06 PROCEDURE — 1200000000 HC SEMI PRIVATE

## 2021-03-06 PROCEDURE — 6360000002 HC RX W HCPCS: Performed by: FAMILY MEDICINE

## 2021-03-06 PROCEDURE — 85025 COMPLETE CBC W/AUTO DIFF WBC: CPT

## 2021-03-06 PROCEDURE — 94761 N-INVAS EAR/PLS OXIMETRY MLT: CPT

## 2021-03-06 PROCEDURE — 2580000003 HC RX 258: Performed by: FAMILY MEDICINE

## 2021-03-06 PROCEDURE — 36415 COLL VENOUS BLD VENIPUNCTURE: CPT

## 2021-03-06 PROCEDURE — 6370000000 HC RX 637 (ALT 250 FOR IP): Performed by: FAMILY MEDICINE

## 2021-03-06 PROCEDURE — 87086 URINE CULTURE/COLONY COUNT: CPT

## 2021-03-06 PROCEDURE — 86850 RBC ANTIBODY SCREEN: CPT

## 2021-03-06 PROCEDURE — 36430 TRANSFUSION BLD/BLD COMPNT: CPT

## 2021-03-06 PROCEDURE — 86901 BLOOD TYPING SEROLOGIC RH(D): CPT

## 2021-03-06 PROCEDURE — 85018 HEMOGLOBIN: CPT

## 2021-03-06 PROCEDURE — P9016 RBC LEUKOCYTES REDUCED: HCPCS

## 2021-03-06 RX ORDER — SODIUM CHLORIDE 9 MG/ML
INJECTION, SOLUTION INTRAVENOUS PRN
Status: DISCONTINUED | OUTPATIENT
Start: 2021-03-06 | End: 2021-03-08 | Stop reason: HOSPADM

## 2021-03-06 RX ADMIN — LISINOPRIL 10 MG: 10 TABLET ORAL at 20:45

## 2021-03-06 RX ADMIN — CYANOCOBALAMIN TAB 1000 MCG 500 MCG: 1000 TAB at 08:09

## 2021-03-06 RX ADMIN — PANTOPRAZOLE SODIUM 20 MG: 20 TABLET, DELAYED RELEASE ORAL at 07:22

## 2021-03-06 RX ADMIN — SODIUM CHLORIDE: 9 INJECTION, SOLUTION INTRAVENOUS at 11:32

## 2021-03-06 RX ADMIN — WATER 1000 MG: 1 INJECTION INTRAMUSCULAR; INTRAVENOUS; SUBCUTANEOUS at 19:42

## 2021-03-06 RX ADMIN — HYDROXYZINE HYDROCHLORIDE 25 MG: 25 TABLET, FILM COATED ORAL at 20:45

## 2021-03-06 RX ADMIN — LISINOPRIL 10 MG: 10 TABLET ORAL at 08:09

## 2021-03-06 RX ADMIN — ACETAMINOPHEN 650 MG: 325 TABLET ORAL at 16:58

## 2021-03-06 ASSESSMENT — PAIN SCALES - GENERAL
PAINLEVEL_OUTOF10: 0
PAINLEVEL_OUTOF10: 0

## 2021-03-06 NOTE — H&P
[address] [facility]  History and Physical        Patient:  America Terry  MRN: [id number]    Chief Complaint:    Chief Complaint   Patient presents with    Dysphagia     pt family states ptis going through [facility] Renal [facility]. Pt has been having difficulty swallowing for about 10 days. Pt has a swallow study scheduled for 03/15, but her family is concerned about her not getting any nutrition[       History Obtained From:  patient, family member - Daughter and spouce, electronic medical record  PCP: STEVE Palomino CNP    History of Present Illness: The patient is a 79 y.o. female  With renal cell carcinoma who presents with weakness, dysphagia,weight loss increasing over last 2 wks. She is receiving chemo. Past Medical History:        Diagnosis Date    B12 deficiency     CVA (cerebral vascular accident) ([facility] [address] 2018    left middle cerebral artery stenosis    CVD (cerebrovascular disease)     Hypertension        Past Surgical History:    History reviewed. No pertinent surgical history. Medications Prior to Admission:    Prior to Admission medications    Medication Sig Start Date End Date Taking?  Authorizing Provider   hydrOXYzine (ATARAX) 25 MG tablet Take 25 mg by mouth nightly   Yes Historical Provider, MD   pantoprazole (PROTONIX) 40 MG tablet TAKE 1 TABLET BY MOUTH EVERY DAY BEFORE BREAKFAST 2/18/21  Yes STEVE Palomino CNP   lisinopril (PRINIVIL;ZESTRIL) 10 MG tablet Take 1 tablet by mouth 2 times daily 2/5/21  Yes STEVE Palomino CNP   Cholecalciferol (VITAMIN D3) 125 MCG (5000 UT) TABS Take 5,000 Units by mouth daily   Yes Historical Provider, MD   Cyanocobalamin (VITAMIN B 12) 500 MCG TABS Take 1 tablet by mouth 2 times daily    Yes Historical Provider, MD   prochlorperazine (COMPAZINE) 10 MG tablet Take 1 tablet by mouth every 6 hours as needed (nausea) 3/1/21   Rodolfo Abel MD       Allergies:  Mirtazapine and Zoloft [sertraline]    Social History:   TOBACCO:   reports that she has never smoked. She has never used smokeless tobacco.  ETOH:   reports no history of alcohol use. Family History:       Problem Relation Age of Onset    Coronary Art Dis Maternal Cousin     Cancer Father         prostate    Cancer Paternal Grandfather         stomach       Review of Systems:  Constitutional:negative  for fevers, and negative for chills. Respiratory: positive for shortness of breath, negative for cough, and negative for wheezing  Cardiovascular: negative for chest pain, negative for palpitations, and negative for syncope  Gastrointestinal: positive for poor apopetite negative for abdominal pain, negative for nausea,negative for vomiting, negative for diarrhea, negative for constipation, and negative for hematochezia or melena  Genitourinary: negative for dysuria, negative for urinary urgency, negative for urinary frequency, and negative for hematuria  Neurological: positive for  weakness, neg for numbness or tingling. All other systems were reviewed with the patient and are negative except as stated    Objective:    Vitals:   Temp: 98.6 °F (37 °C)  BP: (!) 124/51  Resp: 20  Pulse: 72  SpO2: 98 %  -----------------------------------------------------------------  Exam:  GEN:    Awake, alert and oriented x3. Poorly nurished. EYES:  EOMI, pupils equal   NECK: Supple. No lymphadenopathy. No carotid bruit  CVS:    regular rate and rhythm, no audible murmur  PULM:  CTA, no wheezes, rales or rhonchi, no acute respiratory distress  ABD:    Bowels sounds normal.  Abdomen is soft. No distention. no tenderness to palpation. EXT:   no edema bilaterally . No calf tenderness. NEURO: Moves all extremities. Motor and sensory are grossly intact  SKIN:  No rashes.   No skin lesions.    -----------------------------------------------------------------  Diagnostic Data:   · All diagnostic data was reviewed  Lab Results   Component Value Date    WBC 6.3 03/06/2021    HGB 6.9 (LL) 03/06/2021    MCV 79.5 (L) 03/06/2021     03/06/2021      Lab Results   Component Value Date    GLUCOSE 136 (H) 03/05/2021    BUN 23 03/05/2021    CREATININE 0.80 03/05/2021     (L) 03/05/2021    K 3.5 (L) 03/05/2021    CALCIUM 8.4 (L) 03/05/2021    CL 92 (L) 03/05/2021    CO2 26 03/05/2021     Lab Results   Component Value Date    WBCUA 50  03/05/2021    RBCUA 0 TO 2 03/05/2021    EPITHUA 10 TO 20 03/05/2021    LEUKOCYTESUR MODERATE (A) 03/05/2021    SPECGRAV 1.020 03/05/2021    GLUCOSEU NEGATIVE 03/05/2021    KETUA NEGATIVE 03/05/2021    PROTEINU TRACE (A) 03/05/2021    HGBUR 1+ (A) 03/05/2021    CASTUA NOT REPORTED 03/05/2021    CRYSTUA NOT REPORTED 03/05/2021    BACTERIA 4+ (A) 03/05/2021    YEAST NOT REPORTED 03/05/2021       Assessment:     Principal Problem:    Acute cystitis without hematuria  Active Problems:    Renal cell carcinoma (HCC)    Dehydration    Anemia due to chemotherapy    Hypokalemia    H/O: CVA (cerebrovascular accident)    Severe protein-calorie malnutrition ([address]  Resolved Problems:    * No resolved hospital problems. *      Plan:     · This patient requires inpatient admission because of acute cystitis requiring iv antibiotics due to poor intake,weight loss  · Factors affecting the medical complexity of this patient include renal cell ca,anemia from chemo,hypokalemia,severe malnutrition, dyspphagia  · Estimated length of stay is 2 days  · Discussed patient's symptoms and data results including labs and imaging studies with the ER MD at time of admission  · High risk drug monitoring: none  ·     CORE MEASURES  · DVT prophylaxis: SCD  · Decubitus ulcer present on admission: No  · CODE STATUS: FULL CODE  · Nutrition Status: poor  · Physical therapy: Yes   · Old Charts reviewed: Yes  · EKG Reviewed:  Yes  · Advance Directive Addressed: Yes    Nicolette Draper M.D.  3/6/2021  10:40 AM

## 2021-03-06 NOTE — PROGRESS NOTES
[facility]  Inpatient/Observation/Outpatient Rehabilitation    Date: 3/6/2021  Patient Name: Andrea Salmeron       [x] Inpatient Acute/Observation       []  Outpatient  : 1940       [] Pt no showed for scheduled appointment    [] Pt refused/declined therapy at this time due to:           [x] Pt cancelled due to:  [] No Reason Given   [] Sick/ill   [] Other: PT eval attempted; Hgb 6.9; per RN hold PT eval until tomorrow; awaiting blood transfusion. Will attempt evaluation at our earliest opportunity.        Ezekiel Collado, PT, DPT Date: 3/6/2021

## 2021-03-06 NOTE — PLAN OF CARE
Problem: Falls - Risk of:  Goal: Will remain free from falls  Description: Will remain free from falls  Outcome: Ongoing  Goal: Absence of physical injury  Description: Absence of physical injury  Outcome: Ongoing     Problem: Skin Integrity:  Goal: Will show no infection signs and symptoms  Description: Will show no infection signs and symptoms  Outcome: Ongoing  Goal: Absence of new skin breakdown  Description: Absence of new skin breakdown  Outcome: Ongoing     Problem: HEMODYNAMIC STATUS  Goal: Patient has stable vital signs and fluid balance  Outcome: Ongoing     Problem: ACTIVITY INTOLERANCE/IMPAIRED MOBILITY  Goal: Mobility/activity is maintained at optimum level for patient  Outcome: Ongoing     Problem: COMMUNICATION IMPAIRMENT  Goal: Ability to express needs and understand communication  Outcome: Ongoing     Problem: Sensory:  Goal: General experience of comfort will improve  Description: General experience of comfort will improve  Outcome: Ongoing     Problem: Urinary Elimination:  Goal: Signs and symptoms of infection will decrease  Description: Signs and symptoms of infection will decrease  Outcome: Ongoing  Goal: Ability to reestablish a normal urinary elimination pattern will improve - after catheter removal  Description: Ability to reestablish a normal urinary elimination pattern will improve  Outcome: Ongoing  Goal: Complications related to the disease process, condition or treatment will be avoided or minimized  Description: Complications related to the disease process, condition or treatment will be avoided or minimized  Outcome: Ongoing

## 2021-03-06 NOTE — PROGRESS NOTES
Comprehensive Nutrition Assessment    Type and Reason for Visit:  Initial, Positive Nutrition Screen, Consult(MST 2, + NS chewing/swallowing, c/s poor PO)    Nutrition Recommendations/Plan:   1. Continue current diet. 2. Offer marie diet supplement or carnation breakfast essentials mixed with whole milk per family supply. 3. Add vanilla magic cup TID with meals. Nutrition Assessment:  Severe malnutrition r/t insufficient energy intakes/altered GI aeb dysphagia, weight loss > 5% x 1 month, PO < 50% > 5 days, . Seen by SLP and dental soft recommended due to mild oral phase dysphagia. Pt has been undergoing cancer Tx, possible mets to lung. Per EMR noted significant PO decline x 10 days. Corrected calcium 9.92. Hgb has decreased to 6.9 today. A1c in pre-diabetes range. Family states they have tried all supplements that were pre-mixed and the only one she likes is an Atkins vanilla shake with 160 calories. Discussed trying carnation breakfast essentials and mix with whole milk for added calories. They are adding 1/2 banana and peanut butter to Atkins shake. Discussed trying magic cup. Pt likes ice cream and pudding, melon. Pt drank a Atkins shake for daughter this morning. Pt did not contribute much to conversation, nodded head to questions, short answers, trying to rest. Daughter states Pt \"gagged\" on the toast and eggs this morning. Encouraged use of peanut butter, cheese, olive oil, butter, etc. for compcting calories. Pt daughter states mother c/o metallic taste or poor smell to other ONS and would not drink them. Likes vanilla flavor the best. Provided high calorie high protein nutrition therapy and recipes to family.     Malnutrition Assessment:  Malnutrition Status:  Severe malnutrition    Context:  Chronic Illness     Findings of the 6 clinical characteristics of malnutrition:  Energy Intake:  7 - 75% or less estimated energy requirements for 1 month or longer  Weight Loss:  7 - Greater than 5% over 1 month(6.5%)     Body Fat Loss:  7 - Severe body fat loss Orbital   Muscle Mass Loss:  7 - Severe muscle mass loss Temples (temporalis), Clavicles (pectoralis & deltoids)  Fluid Accumulation:  No significant fluid accumulation     Strength:  Not Performed    Estimated Daily Nutrient Needs:  Energy (kcal):  8508-2276(98-73/OS); Weight Used for Energy Requirements:  Current     Protein (g):  66-79g(1.5-1.7g/kg);  Weight Used for Protein Requirements:  Current        Fluid (ml/day):  1452 ml; Method Used for Fluid Requirements:  1 ml/kcal      Nutrition Related Findings:  appears severely malnourished      Wounds:  None       Current Nutrition Therapies:    DIET GENERAL; Dental Soft    Anthropometric Measures:  · Height: 5' 7\" (170.2 cm)  · Current Body Weight: 97 lb 1.6 oz (44 kg)   · Admission Body Weight: 97 lb 1.6 oz (44 kg)    · Usual Body Weight: 130 lb 8 oz (59.2 kg)(1/13/20)     · Ideal Body Weight: 135 lbs; % Ideal Body Weight 71.9 %   · BMI: 15.2  · BMI Categories: Underweight (BMI less than 22) age over 72       Nutrition Diagnosis:   · Severe malnutrition, In context of acute illness or injury related to inadequate protein-energy intake, altered GI structure as evidenced by poor intake prior to admission, weight loss greater than or equal to 5% in 1 month, swallow study results      Nutrition Interventions:   Food and/or Nutrient Delivery:  Continue Current Diet, Start Oral Nutrition Supplement(family supply, start magic cup TID)  Nutrition Education/Counseling:  Education completed(with daughter and Pt )   Coordination of Nutrition Care:  Continue to monitor while inpatient    Goals:  PO > 75% of meals and supplements        Recent Labs     03/05/21  1311   *   K 3.5*   CL 92*   CO2 26   BUN 23   CREATININE 0.80   GLUCOSE 136*   ALT 15   ALKPHOS 157*   GFR                 Lab Results   Component Value Date    LABALBU 2.1 03/05/2021      Nutrition Monitoring and Evaluation: Behavioral-Environmental Outcomes:  Knowledge or Skill   Food/Nutrient Intake Outcomes:  Food and Nutrient Intake, Supplement Intake  Physical Signs/Symptoms Outcomes:  Biochemical Data, Weight, Nutrition Focused Physical Findings     Discharge Planning:    Continue current diet, Continue Oral Nutrition Supplement     Electronically signed by Enedelia Edmondson RD, LD on 3/6/21 at 10:49 AM EST    Contact: [phone]

## 2021-03-06 NOTE — PLAN OF CARE
Problem: COMMUNICATION IMPAIRMENT  Goal: Ability to express needs and understand communication  Outcome: Met This Shift     Problem: Falls - Risk of:  Goal: Will remain free from falls  Description: Will remain free from falls  Outcome: Ongoing  Note: Falling star program in place. Patient alert and oriented to own ability and uses call light appropriately. No Falls as of present. Goal: Absence of physical injury  Description: Absence of physical injury  Outcome: Ongoing  Note:  Bed in lowest position at all times with wheels locked. Bed alarm active. Falling star posted on door frame and yellow sticker visible on patient bracelet. Call light and bedside table with in reach. ID information verified as correct and visible. Will continue to monitor and intervene as necessary. Omaira Diaz RN      Problem: Skin Integrity:  Goal: Will show no infection signs and symptoms  Description: Will show no infection signs and symptoms  Outcome: Ongoing  Note: Being treated for UTI. Low grade fever. Monotoring labs as ordered. Goal: Absence of new skin breakdown  Description: Absence of new skin breakdown  Outcome: Ongoing  Note: Coccyx reddened. Barrier cream applied. Assistance with repositioning. Problem: HEMODYNAMIC STATUS  Goal: Patient has stable vital signs and fluid balance  Outcome: Ongoing  Note: Monitoring vital signs as per order and as needed. Diastolic blood pressure tend to run low. Denies dizziness or lightheadedness. Problem: ACTIVITY INTOLERANCE/IMPAIRED MOBILITY  Goal: Mobility/activity is maintained at optimum level for patient  Outcome: Ongoing  Note: PT/OT for strengthening. Patient transfers and ambulates only short distances. Patient tires easily.      Problem: Sensory:  Goal: General experience of comfort will improve  Description: General experience of comfort will improve  Outcome: Ongoing     Problem: Urinary Elimination:  Goal: Signs and symptoms of infection will decrease Description: Signs and symptoms of infection will decrease  Outcome: Ongoing  Note: Monitoring labs as ordered. Goal: Complications related to the disease process, condition or treatment will be avoided or minimized  Description: Complications related to the disease process, condition or treatment will be avoided or minimized  Outcome: Ongoing     Problem: Nutrition  Description: Nutrition Problem #1: Severe malnutrition, In context of acute illness or injury   Intervention: Food and/or Nutrient Delivery: Continue Current Diet, Start Oral Nutrition Supplement(family supply, start magic cup TID)  Nutritional Goals: PO > 75% of meals and supplements       Goal: Optimal nutrition therapy  Outcome: Ongoing  Note: Diet taken poorly. Supplements provided with meals. Problem: Urinary Elimination:  Goal: Ability to reestablish a normal urinary elimination pattern will improve - after catheter removal  Description: Ability to reestablish a normal urinary elimination pattern will improve  Outcome: Completed  Note: Patient does not have higgins catheter.

## 2021-03-06 NOTE — PROGRESS NOTES
Dinner tray brought up for patient at this time. Patient requires dental soft foot. Family at bedside assist patient at this time. Patient able to independently feed self with set up.

## 2021-03-06 NOTE — PROGRESS NOTES
Patient assisted to [facility] x1 assist stand and pivot. x1 unmeasured urination. Zulma, clear, mal-odorous. Patient tolerated well. Patient assisted with pulling brief down and wiping. Brief clean. Patient returned to bed at this time. Vitals and assessment completed. Patient denies any needs at this time. Patient got comfortable in bed. Call light, bedside table and personal belongings within reach. Bed alarm on. Will continue to monitor.

## 2021-03-06 NOTE — PROGRESS NOTES
[address] [place] THERAPY  No Visit Note    [] ICU    [x] Acute   Patient: Lou Stahl  Room: 3966/2210-56      Lou Stahl not seen on 3/6/2021 at 12:55 PM . OT eval attempted; Hgb 6.9; per RN hold OT eval until tomorrow; awaiting blood transfusion. .          Signature: REJI Kelsey, OTR/L

## 2021-03-07 LAB
ABSOLUTE EOS #: <0.03 K/UL (ref 0–0.44)
ABSOLUTE IMMATURE GRANULOCYTE: 0.04 K/UL (ref 0–0.3)
ABSOLUTE LYMPH #: 0.74 K/UL (ref 1.1–3.7)
ABSOLUTE MONO #: 0.39 K/UL (ref 0.1–1.2)
BASOPHILS # BLD: 1 % (ref 0–2)
BASOPHILS ABSOLUTE: 0.04 K/UL (ref 0–0.2)
CULTURE: NORMAL
DIFFERENTIAL TYPE: ABNORMAL
EOSINOPHILS RELATIVE PERCENT: 0 % (ref 1–4)
HCT VFR BLD CALC: 28.9 % (ref 36.3–47.1)
HEMOGLOBIN: 8.7 G/DL (ref 11.9–15.1)
IMMATURE GRANULOCYTES: 1 %
LYMPHOCYTES # BLD: 13 % (ref 24–43)
Lab: NORMAL
MCH RBC QN AUTO: 24 PG (ref 25.2–33.5)
MCHC RBC AUTO-ENTMCNC: 30.1 G/DL (ref 28.4–34.8)
MCV RBC AUTO: 79.6 FL (ref 82.6–102.9)
MONOCYTES # BLD: 7 % (ref 3–12)
NRBC AUTOMATED: 0 PER 100 WBC
PDW BLD-RTO: 18.7 % (ref 11.8–14.4)
PLATELET # BLD: 237 K/UL (ref 138–453)
PLATELET ESTIMATE: ABNORMAL
PMV BLD AUTO: 10 FL (ref 8.1–13.5)
RBC # BLD: 3.63 M/UL (ref 3.95–5.11)
RBC # BLD: ABNORMAL 10*6/UL
SEG NEUTROPHILS: 78 % (ref 36–65)
SEGMENTED NEUTROPHILS ABSOLUTE COUNT: 4.44 K/UL (ref 1.5–8.1)
SPECIMEN DESCRIPTION: NORMAL
WBC # BLD: 5.7 K/UL (ref 3.5–11.3)
WBC # BLD: ABNORMAL 10*3/UL

## 2021-03-07 PROCEDURE — 36415 COLL VENOUS BLD VENIPUNCTURE: CPT

## 2021-03-07 PROCEDURE — 2580000003 HC RX 258: Performed by: FAMILY MEDICINE

## 2021-03-07 PROCEDURE — 85025 COMPLETE CBC W/AUTO DIFF WBC: CPT

## 2021-03-07 PROCEDURE — 6360000002 HC RX W HCPCS: Performed by: FAMILY MEDICINE

## 2021-03-07 PROCEDURE — 1200000000 HC SEMI PRIVATE

## 2021-03-07 PROCEDURE — 97166 OT EVAL MOD COMPLEX 45 MIN: CPT

## 2021-03-07 PROCEDURE — 97163 PT EVAL HIGH COMPLEX 45 MIN: CPT

## 2021-03-07 PROCEDURE — 6370000000 HC RX 637 (ALT 250 FOR IP): Performed by: FAMILY MEDICINE

## 2021-03-07 PROCEDURE — 94761 N-INVAS EAR/PLS OXIMETRY MLT: CPT

## 2021-03-07 RX ADMIN — WATER 1000 MG: 1 INJECTION INTRAMUSCULAR; INTRAVENOUS; SUBCUTANEOUS at 16:55

## 2021-03-07 RX ADMIN — LISINOPRIL 10 MG: 10 TABLET ORAL at 20:04

## 2021-03-07 RX ADMIN — CYANOCOBALAMIN TAB 1000 MCG 500 MCG: 1000 TAB at 08:22

## 2021-03-07 RX ADMIN — SODIUM CHLORIDE: 9 INJECTION, SOLUTION INTRAVENOUS at 17:15

## 2021-03-07 RX ADMIN — CYANOCOBALAMIN TAB 1000 MCG 500 MCG: 1000 TAB at 16:56

## 2021-03-07 RX ADMIN — PANTOPRAZOLE SODIUM 20 MG: 20 TABLET, DELAYED RELEASE ORAL at 08:22

## 2021-03-07 RX ADMIN — LISINOPRIL 10 MG: 10 TABLET ORAL at 08:21

## 2021-03-07 RX ADMIN — HYDROXYZINE HYDROCHLORIDE 25 MG: 25 TABLET, FILM COATED ORAL at 20:04

## 2021-03-07 NOTE — PROGRESS NOTES
Progress Note  Orly Larson MD    OBJECTIVE:    Patient seen for f/u of Acute cystitis without hematuria. She is improving  Cultures pending     ROS:   Constitutional: negative  for fevers, and negative for chills. Respiratory: negative for shortness of breath, negative for cough, and negative for wheezing  Cardiovascular: negative for chest pain, and negative for palpitations  Gastrointestinal: negative for abdominal pain, negative for nausea,negative for vomiting, negative for diarrhea, and negative for constipation     All other systems were reviewed with the patient and are negative unless otherwise stated in HPI    OBJECTIVE:  Vitals:   Temp: 98.7 °F (37.1 °C)  BP: (!) 138/58  Resp: 18  Pulse: 71  SpO2: 98 %    24HR INTAKE/OUTPUT:      Intake/Output Summary (Last 24 hours) at 3/7/2021 0956  Last data filed at 3/6/2021 1945  Gross per 24 hour   Intake 2534 ml   Output    Net 2534 ml     -----------------------------------------------------------------  Exam:  GEN:    Awake, alert and oriented x3. EYES:  EOMI, pupils equal   NECK: Supple. No lymphadenopathy. No carotid bruit  CVS:    regular rate and rhythm,   PULM:  CTA, no wheezes, rales or rhonchi, no acute respiratory distress  ABD:    Bowels sounds normal.  Abdomen is soft. No distention. no tenderness to palpation. EXT:   no edema bilaterally . No calf tenderness. NEURO: Moves all extremities. Motor and sensory are grossly intact  SKIN:  No rashes.   No skin lesions.    -----------------------------------------------------------------  Diagnostic Data:    · All available data reviewed  Lab Results   Component Value Date    WBC 5.7 03/07/2021    HGB 8.7 (L) 03/07/2021    MCV 79.6 (L) 03/07/2021     03/07/2021      Lab Results   Component Value Date    GLUCOSE 136 (H) 03/05/2021    BUN 23 03/05/2021    CREATININE 0.80 03/05/2021     (L) 03/05/2021    K 3.5 (L) 03/05/2021    CALCIUM 8.4 (L) 03/05/2021    CL 92 (L) 03/05/2021 CO2 26 03/05/2021       PROBLEM LIST:  Principal Problem:    Acute cystitis without hematuria  Active Problems:    Renal cell carcinoma (HCC)    Dehydration    Anemia due to chemotherapy    Hypokalemia    H/O: CVA (cerebrovascular accident)    Severe malnutrition ([address]  Resolved Problems:    * No resolved hospital problems.  *      ASSESSMENT / PLAN:  Acute cystitis without hematuria  · Continue current therapy  · Await cultures  · Anemia- improved,monitor hb  · Nutrition status: severe malnutrition  · DVT prophylaxis: SCD's   · High risk medications: none   · Disposition:  Discharge plan is home    Kuldeep Noel M.D.  3/7/2021  9:56 AM

## 2021-03-07 NOTE — PROGRESS NOTES
Vital signs and assessment completed. Patient assisted to stand and pivot to bedside commode. Patient returned to bed. Patient denies any needs at this time. Will continue to monitor. Call light, bedside table and personal belongings within reach. Bed alarm on.

## 2021-03-07 NOTE — PROGRESS NOTES
Patient's hemoglobin came up to 8.4 post transfusion of 1 unit of blood. Patient's hemoglobin was 6.9 prior to transfusion.

## 2021-03-07 NOTE — PROGRESS NOTES
Occupational Therapy   Occupational Therapy Initial Assessment  Date: 3/7/2021   Patient Name: Gregory Helm  MRN: [id number]     : 1940    Date of Service: 3/7/2021    Discharge Recommendations:  Subacute/Skilled Nursing Facility       Assessment   Performance deficits / Impairments: Decreased functional mobility ; Decreased balance;Decreased ADL status; Decreased coordination;Decreased endurance;Decreased high-level IADLs;Decreased strength  Assessment: Pt is a 79 y.o. female referred to OT services s/p admitting to hospital with Acute Cystitis without hematuria. Pt is accompanied by family who report pt requires assist for ADLs and fx transfers at home. CGA for fx transfers this date. Treatment Diagnosis: Acute Cystitis without hematuria  Prognosis: Good  Decision Making: Medium Complexity  OT Education: OT Role;Plan of Care  Patient Education: Pt educated on OT Role and POC  Barriers to Learning: None  REQUIRES OT FOLLOW UP: Yes  Activity Tolerance  Activity Tolerance: Patient limited by fatigue  Activity Tolerance: Pt tolerating session fairly and is limited by fatigue. Safety Devices  Safety Devices in place: Yes  Type of devices: All fall risk precautions in place           Patient Diagnosis(es): The primary encounter diagnosis was Dehydration. Diagnoses of Acute cystitis without hematuria and Renal cell carcinoma, unspecified laterality ([address] were also pertinent to this visit. has a past medical history of B12 deficiency, CVA (cerebral vascular accident) ([address] CVD (cerebrovascular disease), and Hypertension. has no past surgical history on file.     Treatment Diagnosis: Acute Cystitis without hematuria      Restrictions  Restrictions/Precautions  Restrictions/Precautions: Fall Risk, General Precautions    Subjective   General  Patient assessed for rehabilitation services?: Yes  Family / Caregiver Present: Yes  Referring Practitioner: Dr. Lyndsey Finch  Diagnosis: Acute Cystitis without hematuria Subjective  Subjective: \"I'm good\"  General Comment  Comments: Pt reports no pain. 0/10    Social/Functional History  Social/Functional History  Lives With: Spouse  Type of Home: House  Home Layout: Two level, Able to Live on Main level with bedroom/bathroom  Home Access: Stairs to enter with rails  Bathroom Shower/Tub: (Pt has recently only been sponge bathing per family)  Bathroom Toilet: Standard  Bathroom Equipment: Grab bars around toilet  Home Equipment: Rolling walker  ADL Assistance: Needs assistance  Homemaking Assistance: Needs assistance  Ambulation Assistance: Needs assistance  Transfer Assistance: Needs assistance  Active : No  Patient's  Info: Pt is able to drive, however  does most the driving. Additional Comments: Family present for evaluation and provided the following history. Patient requires hand held assistance from family or spouse to get up from the chair and to walk. Family stated patient had a stroke in 2018 that affected her left side so she doesn't use that side much. Family stated patient has used a walker but they are fearful of her legs giving out and nothing being there to catch her.        Objective   Vision: Within Functional Limits  Hearing: Within functional limits    Orientation  Overall Orientation Status: Within Normal Limits     Balance  Standing Balance: Contact guard assistance  Functional Mobility  Activity: To/from bathroom  Assist Level: Contact guard assistance  ADL  Feeding: Setup  Grooming: Setup  UE Bathing: Minimal assistance  LE Bathing: Maximum assistance  UE Dressing: Minimal assistance  LE Dressing: Maximum assistance  Toileting: Maximum assistance        Bed mobility  Sit to Supine: Minimal assistance  Transfers  Sit to stand: Contact guard assistance  Stand to sit: Contact guard assistance     Cognition  Overall Cognitive Status: WFL  Perception  Overall Perceptual Status: WFL     Sensation  Overall Sensation Status: WFL        LUE AROM (degrees)  LUE AROM : WFL  Left Hand AROM (degrees)  Left Hand AROM: WFL  RUE AROM (degrees)  RUE AROM : WFL  Right Hand AROM (degrees)  Right Hand AROM: WFL  LUE Strength  Gross LUE Strength: Exceptions to [facility]  L Hand General: 3+/5  RUE Strength  Gross RUE Strength: Exceptions to [facility]  R Hand General: 3+/5                   Plan   Plan  Times per week: 7  Times per day: Daily  Current Treatment Recommendations: Strengthening, Endurance Training, Patient/Caregiver Education & Training, Self-Care / ADL, Balance Training, Functional Mobility Training, Safety Education & Training    G-Code     OutComes Score                                                  AM-PAC Score        AM-PAC Inpatient Daily Activity Raw Score: 17 (03/07/21 1056)  AM-PAC Inpatient ADL T-Scale Score : 37.26 (03/07/21 1056)  ADL Inpatient CMS 0-100% Score: 50.11 (03/07/21 1056)  ADL Inpatient CMS G-Code Modifier : CK (03/07/21 1056)    Goals  Short term goals  Time Frame for Short term goals: 20 Days  Short term goal 1: Pt will perform toilet transfer/toileting with close supervision using LRD with no LOB. Short term goal 2: Pt will perform LBD with min A using AE as needed and LRD. Short term goal 3: Pt will participate in dynamic standing activity of choice with CGA using LRD with no loss of balance. Short term goal 4: Pt will demonstrate 100% competency of BUE HEP to facilitate UE strength required for ADLs. Patient Goals   Patient goals : Pt does not state.        Therapy Time   Individual Concurrent Group Co-treatment   Time In 0944         Time Out 0956         Minutes [address]

## 2021-03-07 NOTE — PROGRESS NOTES
Physical Therapy    Facility/Department: [facility] MED SURG  Initial Assessment    NAME: Gregory Helm  : 1940  MRN: [id number]    Date of Service: 3/7/2021    Discharge Recommendations:  Continue to assess pending progress, Subacute/Skilled Nursing Facility, ECF with PT     Assessment   Body structures, Functions, Activity limitations: Decreased functional mobility ; Decreased ADL status; Decreased strength;Decreased endurance;Decreased balance;Decreased posture  Assessment: PT evaluation completed. Patient completed sit to stand transfers and sit to supine transfer with minimal assistance. Patient ambulated with 2 hand held assistance 5 steps to edge of bed with unsteadiness. PT educated family and patient on therapy working with patient to safely utilize rolling walker to encourge more independence with ADLs with family and patient in agreement. Patient would benefit from continued therapy in order to improve balance, strength and functional mobility  Treatment Diagnosis: diffiutly walking  Decision Making: High Complexity  PT Education: PT Role;Goals; Equipment  REQUIRES PT FOLLOW UP: Yes  Activity Tolerance  Activity Tolerance: Patient limited by fatigue       Patient Diagnosis(es): The primary encounter diagnosis was Dehydration. Diagnoses of Acute cystitis without hematuria and Renal cell carcinoma, unspecified laterality ([address] were also pertinent to this visit. has a past medical history of B12 deficiency, CVA (cerebral vascular accident) ([address] CVD (cerebrovascular disease), and Hypertension. has no past surgical history on file.     Restrictions  Restrictions/Precautions  Restrictions/Precautions: Fall Risk, General Precautions  Vision/Hearing  Vision: Within Functional Limits  Hearing: Within functional limits     Subjective  General  Chart Reviewed: Yes  Patient assessed for rehabilitation services?: Yes  Family / Caregiver Present: Yes  Referring Practitioner: Reji Ramirez MD  Referral Date : 03/06/21  Diagnosis: Dehydration  Subjective  Subjective: patient denied pain and in agreement to get up  Pain Screening  Patient Currently in Pain: Denies          Orientation     Social/Functional History  Social/Functional History  Lives With: Spouse  Type of Home: House  Home Layout: Two level, Able to Live on Main level with bedroom/bathroom  Home Access: Stairs to enter with rails  Home Equipment: Rolling walker  ADL Assistance: Needs assistance  Homemaking Assistance: Needs assistance  Ambulation Assistance: Needs assistance  Transfer Assistance: Needs assistance  Additional Comments: Family present for evaluation and provided the following history. Patient requires hand held assistance from family or spouse to get up from the chair and to walk. Family stated patient had a stroke in 2018 that affected her left side so she doesn't use that side much. Family stated patient has used a walker but they are fearful of her legs giving out and nothing being there to catch her. Objective  AROM RLE (degrees)  RLE AROM: WFL  AROM LLE (degrees)  LLE AROM : WFL  Strength RLE  Comment: grossly 3+/5  Strength LLE  Comment: grossly 3/5        Bed mobility  Sit to Supine: Minimal assistance  Transfers  Sit to Stand: Minimal Assistance  Stand to sit: Minimal Assistance  Ambulation  Ambulation?: Yes  Ambulation 1  Surface: level tile  Device: No Device  Assistance:  Moderate assistance(2 hand held assistance)  Gait Deviations: Slow Damaris;Staggers;Decreased step length;Decreased step height  Distance: 5 steps from chair to edge of bed     Balance  Posture: Fair  Sitting - Static: Fair;+  Sitting - Dynamic: Fair  Standing - Static: Poor  Standing - Dynamic: Poor        Plan   Plan  Times per week: 7 times per week  Times per day: Twice a day(1 time per day on weekends)  Current Treatment Recommendations: Strengthening, Neuromuscular Re-education, Home Exercise Program, ROM, Safety Education & Training, Balance Training, Endurance Training, Patient/Caregiver Education & Training, Functional Mobility Training, Manual Therapy - Joint Manipulation, Equipment Evaluation, Education, & procurement, Transfer Training, Gait Training, Stair training, Positioning  Safety Devices  Type of devices: Bed alarm in place, Call light within reach, Left in bed    AM-PAC Score  AM-PAC Inpatient Mobility without Stair Climbing Raw Score : 14 (03/07/21 1025)  AM-PAC Inpatient without Stair Climbing T-Scale Score : 40.85 (03/07/21 1025)  Mobility Inpatient CMS 0-100% Score: 53.33 (03/07/21 1025)  Mobility Inpatient without Stair CMS G-Code Modifier : CK (03/07/21 1025)       Goals  Short term goals  Time Frame for Short term goals: 20 visits  Short term goal 1: Patient will demonstrate the ability to completed transfers and bed mobility with SBAx1 in order to ease functional mobility  Short term goal 2: Patient will ambulate 75 feet with RW with CGAx1 in order to ease functional mobility  Short term goal 3: Patient will ascend/descend 5 steps with bilateral handrail and CGAx1 in order to safely enter/exit the home  Short term goal 4: Patient will tolerate 35-45' ther-ex in order to increase endurance and ease ADLs       Therapy Time   Individual Concurrent Group Co-treatment   Time In 0949         Time Out 0959         Minutes 10                 Judith Ortega PT, DPT

## 2021-03-07 NOTE — PROGRESS NOTES
Assisted patient to [facility] x1 assist, stand and pivot. Patient tolerated well. Patient returned to bed. Call light, bedside table and personal belongings within reach. Patient denies any other needs. Will continue to monitor.

## 2021-03-07 NOTE — PLAN OF CARE
Problem: Falls - Risk of:  Goal: Will remain free from falls  Description: Will remain free from falls  3/7/2021 0902 by Amaya Hu RN  Outcome: Ongoing     Problem: Falls - Risk of:  Goal: Absence of physical injury  Description: Absence of physical injury  3/7/2021 0902 by Amaya Hu RN  Outcome: Ongoing     Problem: Skin Integrity:  Goal: Will show no infection signs and symptoms  Description: Will show no infection signs and symptoms  3/7/2021 0902 by Amaya Hu RN  Outcome: Ongoing     Problem: Skin Integrity:  Goal: Absence of new skin breakdown  Description: Absence of new skin breakdown  3/7/2021 0902 by Amaya Hu RN  Outcome: Ongoing     Problem: HEMODYNAMIC STATUS  Goal: Patient has stable vital signs and fluid balance  3/7/2021 0902 by Amaya Hu RN  Outcome: Ongoing     Problem: ACTIVITY INTOLERANCE/IMPAIRED MOBILITY  Goal: Mobility/activity is maintained at optimum level for patient  3/7/2021 0902 by Amaya Hu RN  Outcome: Ongoing     Problem: COMMUNICATION IMPAIRMENT  Goal: Ability to express needs and understand communication  3/7/2021 0902 by Amaya Hu RN  Outcome: Ongoing     Problem: Sensory:  Goal: General experience of comfort will improve  Description: General experience of comfort will improve  3/7/2021 0902 by Amaya Hu RN  Outcome: Ongoing     Problem: Urinary Elimination:  Goal: Signs and symptoms of infection will decrease  Description: Signs and symptoms of infection will decrease  3/7/2021 0902 by Amaya Hu RN  Outcome: Ongoing     Problem: Urinary Elimination:  Goal: Complications related to the disease process, condition or treatment will be avoided or minimized  Description: Complications related to the disease process, condition or treatment will be avoided or minimized  3/7/2021 0902 by Amaya Hu RN  Outcome: Ongoing     Problem: Nutrition  Goal: Optimal nutrition therapy  3/7/2021 0902 by Amaya Hu RN Outcome: Ongoing

## 2021-03-07 NOTE — PLAN OF CARE
Problem: Falls - Risk of:  Goal: Will remain free from falls  Description: Will remain free from falls  3/7/2021 0455 by Phuc Osorio RN  Outcome: Ongoing     Problem: Falls - Risk of:  Goal: Absence of physical injury  Description: Absence of physical injury  3/7/2021 0455 by Phuc Osorio RN  Outcome: Ongoing     Problem: Skin Integrity:  Goal: Will show no infection signs and symptoms  Description: Will show no infection signs and symptoms  3/7/2021 0455 by Phuc Osorio RN  Outcome: Ongoing     Problem: Skin Integrity:  Goal: Absence of new skin breakdown  Description: Absence of new skin breakdown  3/7/2021 0455 by Phuc Osorio RN  Outcome: Ongoing     Problem: HEMODYNAMIC STATUS  Goal: Patient has stable vital signs and fluid balance  3/7/2021 0455 by Phuc Osorio RN  Outcome: Ongoing     Problem: ACTIVITY INTOLERANCE/IMPAIRED MOBILITY  Goal: Mobility/activity is maintained at optimum level for patient  3/7/2021 0455 by Phuc Osorio RN  Outcome: Ongoing     Problem: COMMUNICATION IMPAIRMENT  Goal: Ability to express needs and understand communication  3/7/2021 0455 by Phuc Osorio RN  Outcome: Ongoing     Problem: Sensory:  Goal: General experience of comfort will improve  Description: General experience of comfort will improve  3/7/2021 0455 by Phuc Osorio RN  Outcome: Ongoing     Problem: Urinary Elimination:  Goal: Signs and symptoms of infection will decrease  Description: Signs and symptoms of infection will decrease  3/7/2021 0455 by Phuc Osorio RN  Outcome: Ongoing     Problem: Urinary Elimination:  Goal: Complications related to the disease process, condition or treatment will be avoided or minimized  Description: Complications related to the disease process, condition or treatment will be avoided or minimized  3/7/2021 0455 by Phuc Osorio RN  Outcome: Ongoing     Problem: Nutrition  Goal: Optimal nutrition therapy  3/7/2021 0455 by Phuc Osorio RN  Outcome: Ongoing

## 2021-03-08 VITALS
DIASTOLIC BLOOD PRESSURE: 56 MMHG | WEIGHT: 99.21 LBS | RESPIRATION RATE: 26 BRPM | BODY MASS INDEX: 15.57 KG/M2 | HEIGHT: 67 IN | SYSTOLIC BLOOD PRESSURE: 140 MMHG | TEMPERATURE: 98.5 F | OXYGEN SATURATION: 94 % | HEART RATE: 75 BPM

## 2021-03-08 LAB
ABSOLUTE EOS #: <0.03 K/UL (ref 0–0.44)
ABSOLUTE IMMATURE GRANULOCYTE: 0.05 K/UL (ref 0–0.3)
ABSOLUTE LYMPH #: 1.05 K/UL (ref 1.1–3.7)
ABSOLUTE MONO #: 0.44 K/UL (ref 0.1–1.2)
ANION GAP SERPL CALCULATED.3IONS-SCNC: 9 MMOL/L (ref 9–17)
BASOPHILS # BLD: 1 % (ref 0–2)
BASOPHILS ABSOLUTE: 0.03 K/UL (ref 0–0.2)
BUN BLDV-MCNC: 13 MG/DL (ref 8–23)
BUN/CREAT BLD: 21 (ref 9–20)
CALCIUM SERPL-MCNC: 7.8 MG/DL (ref 8.6–10.4)
CHLORIDE BLD-SCNC: 101 MMOL/L (ref 98–107)
CO2: 21 MMOL/L (ref 20–31)
CREAT SERPL-MCNC: 0.62 MG/DL (ref 0.5–0.9)
DIFFERENTIAL TYPE: ABNORMAL
EOSINOPHILS RELATIVE PERCENT: 0 % (ref 1–4)
GFR AFRICAN AMERICAN: >60 ML/MIN
GFR NON-AFRICAN AMERICAN: >60 ML/MIN
GFR SERPL CREATININE-BSD FRML MDRD: ABNORMAL ML/MIN/{1.73_M2}
GFR SERPL CREATININE-BSD FRML MDRD: ABNORMAL ML/MIN/{1.73_M2}
GLUCOSE BLD-MCNC: 94 MG/DL (ref 70–99)
HCT VFR BLD CALC: 26.5 % (ref 36.3–47.1)
HEMOGLOBIN: 7.9 G/DL (ref 11.9–15.1)
IMMATURE GRANULOCYTES: 1 %
LYMPHOCYTES # BLD: 19 % (ref 24–43)
MCH RBC QN AUTO: 23.7 PG (ref 25.2–33.5)
MCHC RBC AUTO-ENTMCNC: 29.8 G/DL (ref 28.4–34.8)
MCV RBC AUTO: 79.3 FL (ref 82.6–102.9)
MONOCYTES # BLD: 8 % (ref 3–12)
NRBC AUTOMATED: 0 PER 100 WBC
PDW BLD-RTO: 18.7 % (ref 11.8–14.4)
PLATELET # BLD: 204 K/UL (ref 138–453)
PLATELET ESTIMATE: ABNORMAL
PMV BLD AUTO: 10 FL (ref 8.1–13.5)
POTASSIUM SERPL-SCNC: 3.7 MMOL/L (ref 3.7–5.3)
RBC # BLD: 3.34 M/UL (ref 3.95–5.11)
RBC # BLD: ABNORMAL 10*6/UL
SEG NEUTROPHILS: 71 % (ref 36–65)
SEGMENTED NEUTROPHILS ABSOLUTE COUNT: 3.9 K/UL (ref 1.5–8.1)
SODIUM BLD-SCNC: 131 MMOL/L (ref 135–144)
WBC # BLD: 5.5 K/UL (ref 3.5–11.3)
WBC # BLD: ABNORMAL 10*3/UL

## 2021-03-08 PROCEDURE — 97116 GAIT TRAINING THERAPY: CPT

## 2021-03-08 PROCEDURE — 2580000003 HC RX 258: Performed by: FAMILY MEDICINE

## 2021-03-08 PROCEDURE — 85025 COMPLETE CBC W/AUTO DIFF WBC: CPT

## 2021-03-08 PROCEDURE — 97110 THERAPEUTIC EXERCISES: CPT

## 2021-03-08 PROCEDURE — 94761 N-INVAS EAR/PLS OXIMETRY MLT: CPT

## 2021-03-08 PROCEDURE — 6370000000 HC RX 637 (ALT 250 FOR IP): Performed by: FAMILY MEDICINE

## 2021-03-08 PROCEDURE — 80048 BASIC METABOLIC PNL TOTAL CA: CPT

## 2021-03-08 PROCEDURE — 36415 COLL VENOUS BLD VENIPUNCTURE: CPT

## 2021-03-08 RX ORDER — CEPHALEXIN 250 MG/1
250 CAPSULE ORAL 4 TIMES DAILY
Qty: 28 CAPSULE | Refills: 0 | Status: SHIPPED | OUTPATIENT
Start: 2021-03-08 | End: 2021-03-15

## 2021-03-08 RX ADMIN — CYANOCOBALAMIN TAB 1000 MCG 500 MCG: 1000 TAB at 08:42

## 2021-03-08 RX ADMIN — LISINOPRIL 10 MG: 10 TABLET ORAL at 08:42

## 2021-03-08 RX ADMIN — PANTOPRAZOLE SODIUM 20 MG: 20 TABLET, DELAYED RELEASE ORAL at 08:42

## 2021-03-08 RX ADMIN — SODIUM CHLORIDE: 9 INJECTION, SOLUTION INTRAVENOUS at 08:44

## 2021-03-08 NOTE — PROGRESS NOTES
lesions  EXT:     no cyanosis, clubbing or edema present    -----------------------------------------------------------------  Diagnostic Data: Reviewed    ASSESSMENT:   Principal Problem:    Acute cystitis without hematuria  Active Problems:    Renal cell carcinoma (HCC)    Dehydration    Anemia due to chemotherapy    Hypokalemia    H/O: CVA (cerebrovascular accident)    Severe malnutrition ([address]  Resolved Problems:    * No resolved hospital problems.  *        PLAN:  · Acute cystitis without hematuria  · Continue IV Rocephin  · Urine culture negative  · Dehydration  · Normal saline at 75 mL's per hour  · Trend labs  · Anemia due to chemotherapy  · CBC dailystable  · Hypokalemia  · Potassium 3.5 on admission  · Recheck BMP today  · High risk medications: None  · Discharge plan: Home today      STEVE Zhang, NP-C

## 2021-03-08 NOTE — PROGRESS NOTES
Reviewed discharge instructions with patient,  and daughter. All questions answered at this time. Patient will be wheeled out to front entrance and leave via private auto at discharge. Daughter would like for patient to have lunch before leaving.

## 2021-03-08 NOTE — PLAN OF CARE
Problem: Falls - Risk of:  Goal: Will remain free from falls  Description: Will remain free from falls  3/7/2021 2040 by Edilberto Snyder RN  Outcome: Ongoing  Note: Fall risk assessment done and patient is a high risk for falls. Alarms on as needed for patient safety. Patient being monitored on a regular basis. No falls noted at this time. Problem: Skin Integrity:  Goal: Will show no infection signs and symptoms  Description: Will show no infection signs and symptoms  3/7/2021 2040 by Edilberto Snyder RN  Outcome: Ongoing  Note: Patient is able to turn self. No new open areas or redness noted. Will continue to assess        Problem: HEMODYNAMIC STATUS  Goal: Patient has stable vital signs and fluid balance  3/7/2021 2040 by Edilberto Snyder RN  Outcome: Ongoing  Note: Vital signs are stable. Problem: ACTIVITY INTOLERANCE/IMPAIRED MOBILITY  Goal: Mobility/activity is maintained at optimum level for patient  3/7/2021 2040 by Edilberto Snyder RN  Outcome: Ongoing     Problem: COMMUNICATION IMPAIRMENT  Goal: Ability to express needs and understand communication  3/7/2021 2040 by Edilberto Snyder RN  Outcome: Ongoing  Note: Patient is able to express needs. Problem: Urinary Elimination:  Goal: Signs and symptoms of infection will decrease  Description: Signs and symptoms of infection will decrease  3/7/2021 2040 by Edilberto Snyder RN  Outcome: Ongoing  Note: Patient is having decreased signs of UTI symptoms.

## 2021-03-08 NOTE — DISCHARGE SUMMARY
Discharge Summary    Derrick Rodriguez  :  1940  MRN:  [id number]    Admit date:  3/5/2021      Discharge date: 3/8/2021     Admitting Physician:  Edward Sr MD    Discharge Diagnoses:    Principal Problem:    Acute cystitis without hematuria  Active Problems:    Renal cell carcinoma ([facility]    Dehydration    Anemia due to chemotherapy    Hypokalemia    H/O: CVA (cerebrovascular accident)    Severe malnutrition ([address]  Resolved Problems:    * No resolved hospital problems. [facility] Course: Derrick Rodriguez is a 79 y.o. female admitted with acute cystitis without hematuria. She resented to the emergency room with complaints of difficulty swallowing. Her family stated that she is continued to have a progression of this over the past 10 days. They were concerned that she was not getting in enough nutrition. She from  has went from 164 pounds down to 100 pounds. She is lost 9 additional pounds since the beginning of . She is currently scheduled for swallow eval on March 15. She is currently receiving treatment for renal cell carcinoma. During her evaluation she was found to have an acute cystitis without hematuria and dehydration. She was admitted and provided IV fluids as well as IV antibiotics. She is tolerating those well. She is eating however she does need assistance from her  to eat. However there is been no further coughing or choking episodes at this time. She will be discharged home today on Keflex for an additional 7 days. Urine culture was negative for growth. Chest x-ray shows mass in the right midlung laterally concerning for metastatic process. No acute process or infection seen.   She is encouraged to follow-up with her oncologist.    Consultants:  none    Procedures: none    Complications: none    Discharge Condition: fair    Exam:  CONSTITUTIONAL:  awake, alert, cooperative, no apparent distress, and appears stated age  EYES:  Lids and lashes normal, pupils equal, round and reactive to light, extra ocular muscles intact, sclera clear, conjunctiva normal  ENT:  Normocephalic, without obvious abnormality, atraumatic, sinuses nontender on palpation, external ears without lesions, oral pharynx with moist mucus membranes, tonsils without erythema or exudates, gums normal and good dentition. NECK:  Supple, symmetrical, trachea midline, no adenopathy, thyroid symmetric, not enlarged and no tenderness, skin normal  HEMATOLOGIC/LYMPHATICS:  no cervical lymphadenopathy and no supraclavicular lymphadenopathy  LUNGS:  No increased work of breathing, good air exchange, clear to auscultation bilaterally, no crackles or wheezing  CARDIOVASCULAR:  Normal apical impulse, regular rate and rhythm, normal S1 and S2, no S3 or S4, and no murmur noted  ABDOMEN:  No scars, normal bowel sounds, soft, non-distended, non-tender, no masses palpated, no hepatosplenomegally  MUSCULOSKELETAL:  There is no redness, warmth, or swelling of the joints. Full range of motion noted. Motor strength is 5 out of 5 all extremities bilaterally.   Tone is normal.  NEUROLOGIC:  Mental Status Exam:  Level of Alertness:   awake  Orientation:   person, place, time  Memory:   normal  SKIN:  no bruising or bleeding, normal skin color, texture, turgor, no redness, warmth, or swelling, no rashes and no lesions  EXT:      no cyanosis, clubbing or edema present      Significant Diagnostic Studies:   Lab Results   Component Value Date    WBC 5.5 03/08/2021    HGB 7.9 (L) 03/08/2021     03/08/2021       Lab Results   Component Value Date    BUN 13 03/08/2021    CREATININE 0.62 03/08/2021     (L) 03/08/2021    K 3.7 03/08/2021    CALCIUM 7.8 (L) 03/08/2021     03/08/2021    CO2 21 03/08/2021    LABGLOM >60 03/08/2021       Lab Results   Component Value Date    WBCUA 50  03/05/2021    RBCUA 0 TO 2 03/05/2021    EPITHUA 10 TO 20 03/05/2021    LEUKOCYTESUR MODERATE (A) 03/05/2021 02/06/2018    LUE weakness 02/06/2018    Essential hypertension 02/06/2018    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 02/06/2018        Discharge Medications:       Cathryne Overcast"   Home Medication Instructions DARIEL:[id number]    Printed on:03/08/21 1041   Medication Information                      cephALEXin (KEFLEX) 250 MG capsule  Take 1 capsule by mouth 4 times daily for 7 days             Cholecalciferol (VITAMIN D3) 125 MCG (5000 UT) TABS  Take 5,000 Units by mouth daily             Cyanocobalamin (VITAMIN B 12) 500 MCG TABS  Take 1 tablet by mouth 2 times daily              hydrOXYzine (ATARAX) 25 MG tablet  Take 25 mg by mouth nightly             lisinopril (PRINIVIL;ZESTRIL) 10 MG tablet  Take 1 tablet by mouth 2 times daily             pantoprazole (PROTONIX) 40 MG tablet  TAKE 1 TABLET BY MOUTH EVERY DAY BEFORE BREAKFAST             prochlorperazine (COMPAZINE) 10 MG tablet  Take 1 tablet by mouth every 6 hours as needed (nausea)                 Patient Instructions:    Activity: activity as tolerated  Diet: regular diet  Wound Care: None  Other: None    Disposition:   Discharge to Home    Follow up:  Patient will be followed by STEVE Mckenzie CNP in 1-2 weeks    CORE MEASURES on Discharge (if applicable)  ACE/ARB in CHF: NA  Statin in [place]: NA  ASA in [place]: NA  Statin in CVA: NA  Antiplatelet in CVA: NA    Total time spent on discharge services: 40 minutes    Including the following activities:  Evaluation and Management of patient  Discussion with patient and/or surrogate about current care plan  Coordination with Case Management and/or   Coordination of care with Consultants (if applicable)   Coordination of care with Receiving Facility Physician (if applicable)  Completion of DME forms (if applicable)  Preparation of Discharge Summary  Preparation of Medication Reconciliation  Preparation of Discharge Prescriptions    Signed:  STEVE Morales, NP-C  3/8/2021, 10:41 AM

## 2021-03-08 NOTE — PROGRESS NOTES
Occupational Therapy  Facility/Department: [facility] MED SURG  Daily Treatment Note  NAME: Jono Ortiz  : 1940  MRN: [id number]    Date of Service: 3/8/2021    Discharge Recommendations:  Subacute/Skilled Nursing Facility       Assessment      OT Education: OT Role;Plan of Care;ADL Adaptive Strategies; Equipment  Patient Education: Pt education on discharge folder including AE, DME, fall prevention. Barriers to Learning: increased fatigue  Activity Tolerance  Activity Tolerance: Patient limited by fatigue  Safety Devices  Type of devices: Left in bed;Call light within reach;Nurse notified         Patient Diagnosis(es): The primary encounter diagnosis was Dehydration. Diagnoses of Acute cystitis without hematuria and Renal cell carcinoma, unspecified laterality ([facility] [address] were also pertinent to this visit. has a past medical history of B12 deficiency, CVA (cerebral vascular accident) ([facility] [address] CVD (cerebrovascular disease), and Hypertension. has no past surgical history on file. Restrictions  Restrictions/Precautions  Restrictions/Precautions: Fall Risk, General Precautions  Required Braces or Orthoses?: No  Subjective   General  Chart Reviewed: Yes  Patient assessed for rehabilitation services?: Yes  Response to previous treatment: Patient with no complaints from previous session  Family / Caregiver Present: Yes( arrives mid tx)  Referring Practitioner: Dr. Bonnie Massey  Diagnosis: Acute Cystitis without hematuria  Subjective  Subjective: Pt reports no pain, increased fatigue. Pt declines getting out of bed this date for ADLs. Agreeable to ther ex EOB  General Comment  Comments: Pt supine in bed upon arrival.  find water spilled on floor, pt states she called for someone to clean up earlier and noone has come.  shows irritation. Cleaned up spill and notified RN that  wanted new fresh cup for pt.   Vital Signs  Patient Currently in Pain: Denies   Orientation  Orientation  Overall Orientation Status: Within Functional Limits  Objective                Bed mobility  Supine to Sit: Minimal assistance  Sit to Supine: Minimal assistance  Scooting: Maximal assistance(required assistance being boosted in bed)                          Cognition  Overall Cognitive Status: WFL                    Type of ROM/Therapeutic Exercise  Type of ROM/Therapeutic Exercise: Free weights  Comment: Therapist instructed pt in BUE ther ex in order to promote strength and activity tolerance for ADL participation. Pt completes 1 x 15 reps x 5 planes of motion with 1# weight seated EOB. Pt requires CGA-Min A throughout sitting due to LOB falling back onto bed not attempting to catch self or self correct. Pt demo increased fatigue throughout. Plan   Plan  Times per week: 7  Times per day: Daily  Current Treatment Recommendations: Strengthening, Endurance Training, Patient/Caregiver Education & Training, Self-Care / ADL, Balance Training, Functional Mobility Training, Safety Education & Training  G-Code     OutComes Score                                                  AM-PAC Score             Goals  Short term goals  Time Frame for Short term goals: 20 Days  Short term goal 1: Pt will perform toilet transfer/toileting with close supervision using LRD with no LOB. Short term goal 2: Pt will perform LBD with min A using AE as needed and LRD. Short term goal 3: Pt will participate in dynamic standing activity of choice with CGA using LRD with no loss of balance. Short term goal 4: Pt will demonstrate 100% competency of BUE HEP to facilitate UE strength required for ADLs. Patient Goals   Patient goals : Pt does not state.        Therapy Time   Individual Concurrent Group Co-treatment   Time In 0726         Time Out 0740         Minutes [address] [facility]

## 2021-03-08 NOTE — PROGRESS NOTES
Patient discharged prior to [place] visit.     [address] [facility] [place]  3/8/2021

## 2021-03-08 NOTE — PROGRESS NOTES
Physical Therapy  Facility/Department: [facility] MED SURG  Daily Treatment Note  NAME: Susan Mustafa  : 1940  MRN: [id number]    Date of Service: 3/8/2021    Discharge Recommendations:  Continue to assess pending progress, Subacute/Skilled Nursing Facility, ECF with PT        Assessment   Treatment Diagnosis: diffiutly walking  Decision Making: High Complexity  PT Education: PT Role;Goals; Equipment  Patient Education: Pt educated on above with fair understanding  REQUIRES PT FOLLOW UP: Yes  Activity Tolerance  Activity Tolerance: Patient limited by fatigue;Patient limited by endurance     Patient Diagnosis(es): The primary encounter diagnosis was Dehydration. Diagnoses of Acute cystitis without hematuria and Renal cell carcinoma, unspecified laterality ([facility] [address] were also pertinent to this visit. has a past medical history of B12 deficiency, CVA (cerebral vascular accident) ([facility] [address] CVD (cerebrovascular disease), and Hypertension. has no past surgical history on file. Restrictions  Restrictions/Precautions  Restrictions/Precautions: Fall Risk, General Precautions  Required Braces or Orthoses?: No  Subjective   General  Chart Reviewed: Yes  Response To Previous Treatment: Patient with no complaints from previous session.   Family / Caregiver Present: Yes(spouse)  Referring Practitioner: Viji Barba MD  Subjective  Subjective: patient denied pain an in agreement to get up          Orientation  Orientation  Overall Orientation Status: Within Functional Limits  Cognition      Objective   Bed mobility  Supine to Sit: Minimal assistance  Scooting: Maximal assistance  Transfers  Sit to Stand: Minimal Assistance  Stand to sit: Minimal Assistance  Comment: Cuing for hand placement  Ambulation  Ambulation?: Yes  Ambulation 1  Surface: level tile  Device: No Device  Assistance: Minimal assistance;Contact guard assistance  Gait Deviations: Slow Damaris;Staggers;Decreased step length;Decreased step height  Distance: 12 feet x 2     Balance  Posture: Fair  Sitting - Static: Fair;+  Sitting - Dynamic: Fair  Standing - Static: Fair;+  Standing - Dynamic: Fair;-  Exercises  Hip Flexion: x15  Hip Abduction: x15  Knee Long Arc Quad: x15  Ankle Pumps: x15  Comments: Above ex completed in sitting position    Goals  Short term goals  Time Frame for Short term goals: 20 visits  Short term goal 1: Patient will demonstrate the ability to completed transfers and bed mobility with SBAx1 in order to ease functional mobility  Short term goal 2: Patient will ambulate 75 feet with RW with CGAx1 in order to ease functional mobility  Short term goal 3: Patient will ascend/descend 5 steps with bilateral handrail and CGAx1 in order to safely enter/exit the home  Short term goal 4: Patient will tolerate 35-45' ther-ex in order to increase endurance and ease ADLs    Plan    Plan  Times per week: 7 times per week  Times per day: Twice a day(1 time per day on weekends)  Current Treatment Recommendations: Strengthening, Neuromuscular Re-education, Home Exercise Program, ROM, Safety Education & Training, Balance Training, Endurance Training, Patient/Caregiver Education & Training, Functional Mobility Training, Manual Therapy - Joint Manipulation, Equipment Evaluation, Education, & procurement, Transfer Training, Gait Training, Stair training, Positioning  Safety Devices  Type of devices: Call light within reach, Left in bed, All fall risk precautions in place, Left in chair, Gait belt, Patient at risk for falls, Nurse notified(pt spouse in room, instructed to call nursing if leaves the room)     Therapy Time   Individual Concurrent Group Co-treatment   Time In 1034         Time Out 1105         Minutes 31                 Martina Kaur PTA

## 2021-03-08 NOTE — PLAN OF CARE
Problem: Falls - Risk of:  Goal: Will remain free from falls  Description: Will remain free from falls  3/8/2021 0918 by Radha Osorio RN  Outcome: Ongoing  3/7/2021 2040 by Ramesh Luke RN  Outcome: Ongoing  Note: Fall risk assessment done and patient is a high risk for falls. Alarms on as needed for patient safety. Patient being monitored on a regular basis. No falls noted at this time. Goal: Absence of physical injury  Description: Absence of physical injury  Outcome: Ongoing     Problem: Skin Integrity:  Goal: Will show no infection signs and symptoms  Description: Will show no infection signs and symptoms  3/8/2021 0918 by Radha Osorio RN  Outcome: Ongoing  3/7/2021 2040 by Ramesh Lkue RN  Outcome: Ongoing  Note: Patient is able to turn self. No new open areas or redness noted. Will continue to assess     Goal: Absence of new skin breakdown  Description: Absence of new skin breakdown  Outcome: Ongoing     Problem: HEMODYNAMIC STATUS  Goal: Patient has stable vital signs and fluid balance  3/8/2021 0918 by Radha Osorio RN  Outcome: Ongoing  3/7/2021 2040 by Ramesh Luke RN  Outcome: Ongoing  Note: Vital signs are stable. Problem: ACTIVITY INTOLERANCE/IMPAIRED MOBILITY  Goal: Mobility/activity is maintained at optimum level for patient  3/8/2021 4622 by Radha Osorio RN  Outcome: Ongoing  3/7/2021 2040 by Ramesh Luke RN  Outcome: Ongoing     Problem: COMMUNICATION IMPAIRMENT  Goal: Ability to express needs and understand communication  3/8/2021 0918 by Radha Osorio RN  Outcome: Ongoing  3/7/2021 2040 by Ramesh Luke RN  Outcome: Ongoing  Note: Patient is able to express needs.      Problem: Sensory:  Goal: General experience of comfort will improve  Description: General experience of comfort will improve  Outcome: Ongoing     Problem: Urinary Elimination:  Goal: Signs and symptoms of infection will decrease  Description: Signs and symptoms of infection will decrease 3/8/2021 0918 by Abelardo Manjarrez RN  Outcome: Ongoing  3/7/2021 2040 by Lindsey Mcnulty RN  Outcome: Ongoing  Note: Patient is having decreased signs of UTI symptoms.    Goal: Complications related to the disease process, condition or treatment will be avoided or minimized  Description: Complications related to the disease process, condition or treatment will be avoided or minimized  Outcome: Ongoing     Problem: Nutrition  Description: Nutrition Problem #1: Severe malnutrition, In context of acute illness or injury   Intervention: Food and/or Nutrient Delivery: Continue Current Diet, Start Oral Nutrition Supplement(family supply, start magic cup TID)  Nutritional Goals: PO > 75% of meals and supplements       Goal: Optimal nutrition therapy  Outcome: Ongoing

## 2021-03-09 PROBLEM — E87.1 HYPONATREMIA: Status: ACTIVE | Noted: 2021-02-05

## 2021-03-12 ENCOUNTER — HOSPITAL ENCOUNTER (OUTPATIENT)
Age: 81
Discharge: HOME | End: 2021-03-12
Payer: MEDICARE

## 2021-03-12 DIAGNOSIS — C64.2 RENAL CELL CARCINOMA OF LEFT KIDNEY (HCC): ICD-10-CM

## 2021-03-12 DIAGNOSIS — D50.9 IRON DEFICIENCY ANEMIA, UNSPECIFIED IRON DEFICIENCY ANEMIA TYPE: ICD-10-CM

## 2021-03-12 LAB
ABSOLUTE EOS #: <0.03 K/UL (ref 0–0.44)
ABSOLUTE IMMATURE GRANULOCYTE: 0.06 K/UL (ref 0–0.3)
ABSOLUTE LYMPH #: 1.16 K/UL (ref 1.1–3.7)
ABSOLUTE MONO #: 0.74 K/UL (ref 0.1–1.2)
ALBUMIN SERPL-MCNC: 1.7 G/DL (ref 3.5–5.2)
ALBUMIN/GLOBULIN RATIO: 0.3 (ref 1–2.5)
ALP BLD-CCNC: 129 U/L (ref 35–104)
ALT SERPL-CCNC: 11 U/L (ref 5–33)
ANION GAP SERPL CALCULATED.3IONS-SCNC: 9 MMOL/L (ref 9–17)
AST SERPL-CCNC: 16 U/L
BASOPHILS # BLD: 0 % (ref 0–2)
BASOPHILS ABSOLUTE: 0.04 K/UL (ref 0–0.2)
BILIRUB SERPL-MCNC: 0.63 MG/DL (ref 0.3–1.2)
BUN BLDV-MCNC: 13 MG/DL (ref 8–23)
BUN/CREAT BLD: 19 (ref 9–20)
CALCIUM SERPL-MCNC: 7.9 MG/DL (ref 8.6–10.4)
CHLORIDE BLD-SCNC: 94 MMOL/L (ref 98–107)
CO2: 24 MMOL/L (ref 20–31)
CREAT SERPL-MCNC: 0.68 MG/DL (ref 0.5–0.9)
DIFFERENTIAL TYPE: ABNORMAL
EOSINOPHILS RELATIVE PERCENT: 0 % (ref 1–4)
GFR AFRICAN AMERICAN: >60 ML/MIN
GFR NON-AFRICAN AMERICAN: >60 ML/MIN
GFR SERPL CREATININE-BSD FRML MDRD: ABNORMAL ML/MIN/{1.73_M2}
GFR SERPL CREATININE-BSD FRML MDRD: ABNORMAL ML/MIN/{1.73_M2}
GLUCOSE BLD-MCNC: 126 MG/DL (ref 70–99)
HCT VFR BLD CALC: 29.9 % (ref 36.3–47.1)
HEMOGLOBIN: 8.9 G/DL (ref 11.9–15.1)
IMMATURE GRANULOCYTES: 1 %
LYMPHOCYTES # BLD: 13 % (ref 24–43)
MCH RBC QN AUTO: 23.6 PG (ref 25.2–33.5)
MCHC RBC AUTO-ENTMCNC: 29.8 G/DL (ref 28.4–34.8)
MCV RBC AUTO: 79.3 FL (ref 82.6–102.9)
MONOCYTES # BLD: 8 % (ref 3–12)
NRBC AUTOMATED: 0 PER 100 WBC
PDW BLD-RTO: 19.1 % (ref 11.8–14.4)
PLATELET # BLD: 219 K/UL (ref 138–453)
PLATELET ESTIMATE: ABNORMAL
PMV BLD AUTO: 10 FL (ref 8.1–13.5)
POTASSIUM SERPL-SCNC: 3.5 MMOL/L (ref 3.7–5.3)
RBC # BLD: 3.77 M/UL (ref 3.95–5.11)
RBC # BLD: ABNORMAL 10*6/UL
SEG NEUTROPHILS: 78 % (ref 36–65)
SEGMENTED NEUTROPHILS ABSOLUTE COUNT: 6.93 K/UL (ref 1.5–8.1)
SODIUM BLD-SCNC: 127 MMOL/L (ref 135–144)
TOTAL PROTEIN: 6.6 G/DL (ref 6.4–8.3)
WBC # BLD: 8.9 K/UL (ref 3.5–11.3)
WBC # BLD: ABNORMAL 10*3/UL

## 2021-03-12 PROCEDURE — 85025 COMPLETE CBC W/AUTO DIFF WBC: CPT

## 2021-03-12 PROCEDURE — 80053 COMPREHEN METABOLIC PANEL: CPT

## 2021-03-12 PROCEDURE — 36415 COLL VENOUS BLD VENIPUNCTURE: CPT

## 2021-03-17 ENCOUNTER — OFFICE VISIT (OUTPATIENT)
Dept: ONCOLOGY | Age: 81
End: 2021-03-17
Payer: MEDICARE

## 2021-03-17 VITALS
DIASTOLIC BLOOD PRESSURE: 52 MMHG | WEIGHT: 107 LBS | TEMPERATURE: 98.6 F | BODY MASS INDEX: 16.79 KG/M2 | HEIGHT: 67 IN | HEART RATE: 91 BPM | RESPIRATION RATE: 18 BRPM | SYSTOLIC BLOOD PRESSURE: 138 MMHG

## 2021-03-17 DIAGNOSIS — C64.2 RENAL CELL CARCINOMA OF LEFT KIDNEY (HCC): Primary | ICD-10-CM

## 2021-03-17 DIAGNOSIS — D63.1 ANEMIA OF CHRONIC RENAL FAILURE, STAGE 3A (HCC): ICD-10-CM

## 2021-03-17 DIAGNOSIS — N18.31 ANEMIA OF CHRONIC RENAL FAILURE, STAGE 3A (HCC): ICD-10-CM

## 2021-03-17 DIAGNOSIS — R31.9 HEMATURIA, UNSPECIFIED TYPE: ICD-10-CM

## 2021-03-17 PROCEDURE — 99214 OFFICE O/P EST MOD 30 MIN: CPT | Performed by: INTERNAL MEDICINE

## 2021-03-17 PROCEDURE — 1036F TOBACCO NON-USER: CPT | Performed by: INTERNAL MEDICINE

## 2021-03-17 PROCEDURE — G8427 DOCREV CUR MEDS BY ELIG CLIN: HCPCS | Performed by: INTERNAL MEDICINE

## 2021-03-17 PROCEDURE — 99211 OFF/OP EST MAY X REQ PHY/QHP: CPT | Performed by: INTERNAL MEDICINE

## 2021-03-17 PROCEDURE — G8419 CALC BMI OUT NRM PARAM NOF/U: HCPCS | Performed by: INTERNAL MEDICINE

## 2021-03-17 PROCEDURE — 1123F ACP DISCUSS/DSCN MKR DOCD: CPT | Performed by: INTERNAL MEDICINE

## 2021-03-17 PROCEDURE — 1111F DSCHRG MED/CURRENT MED MERGE: CPT | Performed by: INTERNAL MEDICINE

## 2021-03-17 PROCEDURE — 1090F PRES/ABSN URINE INCON ASSESS: CPT | Performed by: INTERNAL MEDICINE

## 2021-03-17 PROCEDURE — G8400 PT W/DXA NO RESULTS DOC: HCPCS | Performed by: INTERNAL MEDICINE

## 2021-03-17 PROCEDURE — 4040F PNEUMOC VAC/ADMIN/RCVD: CPT | Performed by: INTERNAL MEDICINE

## 2021-03-17 PROCEDURE — G8484 FLU IMMUNIZE NO ADMIN: HCPCS | Performed by: INTERNAL MEDICINE

## 2021-03-17 NOTE — PROGRESS NOTES
_           Chief Complaint   Patient presents with    Follow-up     micrcytic anemia    Hematuria     Patient  styates there was blood in her yesterday when she voided    Abdominal Pain     Patient  states that she had an emesis last night     DIAGNOSIS:       1. Chronic microcytic anemia due to renal insufficiency and nutritional issues. As well as anemia of malignancy  2. History of CVA with left-sided weakness. Resolving. 3. Further work-up showed left kidney mass, likely renal cell carcinoma  4. Multiple lung lesions not amenable to biopsy and too small. But suspicious for metastatic disease  CURRENT THERAPY:         1. For anemia, started on Aranesp  2. Sent for evaluation for surgical intervention for the renal mass but she was not thought to be a surgical candidate  3. systemic therapy likely with Keytruda plus minus axitinib depending on tolerance    BRIEF CASE HISTORY:      Ms. Migel Yu is a very pleasant 79 y.o. female with history of multiple co morbidities as listed. The patient is referred for iron deficiency anemia. Patient was doing very well over very long duration with no underlying medical problems. She had stroke in February 2018 with left-sided weakness. She has significant improvement in her power and function since then. She was maintained on aspirin and vitamins since that time. The patient's last evaluation with her PCP showed severe anemia. Further work-up showed very low iron level with elevated ferritin. Patient does not have any major symptoms. She has increasing weakness and fatigue since January of this year. She has no headaches or dizziness. No palpitation. No shortness of breath. Patient denies any active bleeding. No melena or hematochezia. No hematemesis. No vaginal bleeding. No hematuria. She had stool guaiac test which was negative.   She was mirtazapine and zoloft [sertraline]. FAMILY HISTORY: Negative for any hematological or oncological conditions. SOCIAL HISTORY:  reports that she has never smoked. She has never used smokeless tobacco. She reports that she does not drink alcohol or use drugs. REVIEW OF SYSTEMS:     · General: Positive for weakness and fatigue. Weight is stable, back pain is controlled, ongoing fevers  · Eyes: No blurred vision, eye pain or double vision. · Ears: No hearing problems or drainage. No tinnitus. · Throat: No sore throat, problems with swallowing or dysphagia. · Respiratory: No cough, sputum or hemoptysis. Fatigue and chest discomfort, shortness of breath  · Cardiovascular: No chest pain, orthopnea or PND. No lower extremity edema. No palpitation. · Gastrointestinal: Poor appetite, nausea, epigastric pain. No diarrhea or bleeding  · Genitourinary: No dysuria, hematuria, frequency or urgency. · Musculoskeletal: No muscle aches or pains. No limitation of movement. No back pain. No gait disturbance, No joint complaints. · Dermatologic: No skin rashes or pruritus. No skin lesions or discolorations. · Psychiatric: No depression, anxiety, or stress or signs of schizophrenia. No change in mood or affect. · Hematologic: No history of bleeding tendency. No bruises or ecchymosis. No history of clotting problems. · Infectious disease: No fever, chills or frequent infections. · Endocrine: No polydipsia or polyuria. No temperature intolerance. · Neurologic: History of stroke with left-sided weakness resolving. · Allergic/Immunologic: No nasal congestion or hives. No repeated infections. PHYSICAL EXAM:  The patient is not in acute distress. Vital signs: Blood pressure (!) 138/52, pulse 91, temperature 98.6 °F (37 °C), temperature source Temporal, resp. rate 18, height 5' 7\" (1.702 m), weight 107 lb (48.5 kg), not currently breastfeeding.      General appearance -ill appearing cachectic lady disease. 2. Respiratory motion and mild dependent atelectasis in both lungs. 3. Coronary artery disease.  Atheromatous plaque and atherosclerotic   calcification of the aorta. Abdomen/pelvis:       1. Findings concerning for a large centrally necrotic renal mass likely renal   cell carcinoma involving the inferior half of the left kidney with mass   effect upon the adjacent anatomy including the small bowel loops.  This   measures 11.5 x 9.5 x 11.1 cm in greatest dimensions.  Collateralization of   vessels in the left-sided perinephric space. 2. Appearance of the urinary bladder can be seen with cystitis.  Please   correlate with urinalysis. 3. Fatty liver. 4. Atherosclerotic calcification of the abdominal aorta and branch   vasculature. 5. Probable adrenal hyperplasia. 6. Old granulomatous disease. 7. 7 mm upper pole left renal cyst.       IMPRESSION:   1. Microcytic anemia multifactorial, likely anemia of malignancy and anemia of renal disease as well as anemia of chronic illness   2. History of CVA with left-sided weakness. Resolving. 3. Left-sided renal cell carcinoma  4. Multiple lung lesions concerning for metastatic disease    PLAN:   The patient is ready to start her third cycle of immunotherapy, as mentioned above, the patient and family are Encouraged by her excellent tolerance to treatment and hoping that she is responding. Discussed recent events with UTI and reviewed the records. She is still on antibiotics. She had hematuria. I will do urinalysis and reflex culture today. If positive we will switch antibiotics. Otherwise patient may need to be reevaluated by urology. We will arrange for CT scan after the third cycle of chemotherapy for evaluation of response to treatment. Depending on tolerance and response, I might consider adding Inlyta to her treatment.                             6 [facility] This note is created with the assistance of a speech recognition program.  While intending to generate a document that actually reflects the content of the visit, the document can still have some errors including those of syntax and sound a like substitutions which may escape proof reading. It such instances, actual meaning can be extrapolated by contextual diversion.

## 2021-03-18 ENCOUNTER — HOSPITAL ENCOUNTER (OUTPATIENT)
Age: 81
Setting detail: SPECIMEN
Discharge: HOME | End: 2021-03-18
Payer: MEDICARE

## 2021-03-18 DIAGNOSIS — R31.9 HEMATURIA, UNSPECIFIED TYPE: ICD-10-CM

## 2021-03-18 LAB
-: ABNORMAL
AMORPHOUS: ABNORMAL
BACTERIA: ABNORMAL
BILIRUBIN URINE: ABNORMAL
CASTS UA: ABNORMAL /LPF
COLOR: ABNORMAL
COMMENT UA: ABNORMAL
CRYSTALS, UA: ABNORMAL /HPF
EPITHELIAL CELLS UA: ABNORMAL /HPF (ref 0–25)
GLUCOSE URINE: NEGATIVE
KETONES, URINE: NEGATIVE
LEUKOCYTE ESTERASE, URINE: ABNORMAL
MUCUS: ABNORMAL
NITRITE, URINE: POSITIVE
OTHER OBSERVATIONS UA: ABNORMAL
PH UA: 6 (ref 5–9)
PROTEIN UA: ABNORMAL
RBC UA: ABNORMAL /HPF (ref 0–2)
RENAL EPITHELIAL, UA: ABNORMAL /HPF
SPECIFIC GRAVITY UA: 1.01 (ref 1.01–1.02)
TRICHOMONAS: ABNORMAL
TURBIDITY: ABNORMAL
URINE HGB: ABNORMAL
UROBILINOGEN, URINE: NORMAL
WBC UA: ABNORMAL /HPF (ref 0–5)
YEAST: ABNORMAL

## 2021-03-18 PROCEDURE — 87077 CULTURE AEROBIC IDENTIFY: CPT

## 2021-03-18 PROCEDURE — 81001 URINALYSIS AUTO W/SCOPE: CPT

## 2021-03-18 PROCEDURE — 87086 URINE CULTURE/COLONY COUNT: CPT

## 2021-03-18 PROCEDURE — 87186 SC STD MICRODIL/AGAR DIL: CPT

## 2021-03-18 RX ORDER — CIPROFLOXACIN 500 MG/1
500 TABLET, FILM COATED ORAL 2 TIMES DAILY
Qty: 14 TABLET | Refills: 0 | Status: SHIPPED | OUTPATIENT
Start: 2021-03-18 | End: 2021-03-23 | Stop reason: SINTOL

## 2021-03-20 DIAGNOSIS — N39.0 URINARY TRACT INFECTION WITHOUT HEMATURIA, SITE UNSPECIFIED: Primary | ICD-10-CM

## 2021-03-20 LAB
CULTURE: ABNORMAL
Lab: ABNORMAL
SPECIMEN DESCRIPTION: ABNORMAL

## 2021-03-20 RX ORDER — SULFAMETHOXAZOLE AND TRIMETHOPRIM 800; 160 MG/1; MG/1
1 TABLET ORAL 2 TIMES DAILY
Qty: 14 TABLET | Refills: 0 | Status: SHIPPED | OUTPATIENT
Start: 2021-03-20 | End: 2021-03-23

## 2021-03-21 DIAGNOSIS — Z16.21 VRE (VANCOMYCIN-RESISTANT ENTEROCOCCI) INFECTION: Primary | ICD-10-CM

## 2021-03-21 DIAGNOSIS — A49.1 VRE (VANCOMYCIN-RESISTANT ENTEROCOCCI) INFECTION: Primary | ICD-10-CM

## 2021-03-21 RX ORDER — LINEZOLID 600 MG/1
600 TABLET, FILM COATED ORAL 2 TIMES DAILY
Qty: 28 TABLET | Refills: 0 | Status: ON HOLD | OUTPATIENT
Start: 2021-03-21 | End: 2021-04-01

## 2021-03-21 NOTE — PROGRESS NOTES
D/w pt   Pt has ampicillin resistant VRE   Will call in zyvox   Pt has aranesp scheduled for tomorrow

## 2021-03-22 ENCOUNTER — HOSPITAL ENCOUNTER (OUTPATIENT)
Dept: INFUSION THERAPY | Age: 81
End: 2021-03-22
Payer: MEDICARE

## 2021-03-23 ENCOUNTER — HOSPITAL ENCOUNTER (OUTPATIENT)
Dept: INFUSION THERAPY | Age: 81
Discharge: HOME | DRG: 177 | End: 2021-03-23
Payer: MEDICARE

## 2021-03-23 VITALS
HEIGHT: 67 IN | SYSTOLIC BLOOD PRESSURE: 135 MMHG | HEART RATE: 75 BPM | DIASTOLIC BLOOD PRESSURE: 61 MMHG | TEMPERATURE: 99.4 F | BODY MASS INDEX: 16.32 KG/M2 | RESPIRATION RATE: 16 BRPM | WEIGHT: 104 LBS

## 2021-03-23 DIAGNOSIS — C64.2 RENAL CELL CARCINOMA OF LEFT KIDNEY (HCC): Primary | ICD-10-CM

## 2021-03-23 DIAGNOSIS — R63.4 WEIGHT LOSS: ICD-10-CM

## 2021-03-23 DIAGNOSIS — D63.1 ANEMIA OF CHRONIC RENAL FAILURE, STAGE 3A (HCC): ICD-10-CM

## 2021-03-23 DIAGNOSIS — N18.31 ANEMIA OF CHRONIC RENAL FAILURE, STAGE 3A (HCC): ICD-10-CM

## 2021-03-23 LAB
ABSOLUTE EOS #: 0 K/UL (ref 0–0.44)
ABSOLUTE IMMATURE GRANULOCYTE: 0.07 K/UL (ref 0–0.3)
ABSOLUTE LYMPH #: 0.75 K/UL (ref 1.1–3.7)
ABSOLUTE MONO #: 0.2 K/UL (ref 0.1–1.2)
ALBUMIN SERPL-MCNC: 2.1 G/DL (ref 3.5–5.2)
ALBUMIN/GLOBULIN RATIO: 0.4 (ref 1–2.5)
ALP BLD-CCNC: 132 U/L (ref 35–104)
ALT SERPL-CCNC: 12 U/L (ref 5–33)
AMYLASE: 16 U/L (ref 28–100)
ANION GAP SERPL CALCULATED.3IONS-SCNC: 10 MMOL/L (ref 9–17)
AST SERPL-CCNC: 21 U/L
BASOPHILS # BLD: 0 % (ref 0–2)
BASOPHILS ABSOLUTE: 0 K/UL (ref 0–0.2)
BILIRUB SERPL-MCNC: 0.98 MG/DL (ref 0.3–1.2)
BUN BLDV-MCNC: 13 MG/DL (ref 8–23)
BUN/CREAT BLD: 19 (ref 9–20)
CALCIUM SERPL-MCNC: 8.2 MG/DL (ref 8.6–10.4)
CHLORIDE BLD-SCNC: 89 MMOL/L (ref 98–107)
CO2: 27 MMOL/L (ref 20–31)
CORTISOL COLLECTION INFO: ABNORMAL
CORTISOL: 28.5 UG/DL (ref 2.7–18.4)
CREAT SERPL-MCNC: 0.7 MG/DL (ref 0.5–0.9)
DIFFERENTIAL TYPE: ABNORMAL
EOSINOPHILS RELATIVE PERCENT: 0 % (ref 1–4)
GFR AFRICAN AMERICAN: >60 ML/MIN
GFR NON-AFRICAN AMERICAN: >60 ML/MIN
GFR SERPL CREATININE-BSD FRML MDRD: ABNORMAL ML/MIN/{1.73_M2}
GFR SERPL CREATININE-BSD FRML MDRD: ABNORMAL ML/MIN/{1.73_M2}
GLUCOSE BLD-MCNC: 105 MG/DL (ref 70–99)
HCT VFR BLD CALC: 31.2 % (ref 36.3–47.1)
HEMOGLOBIN: 9.4 G/DL (ref 11.9–15.1)
IMMATURE GRANULOCYTES: 1 %
LIPASE: 13 U/L (ref 13–60)
LYMPHOCYTES # BLD: 11 % (ref 24–43)
MCH RBC QN AUTO: 23.8 PG (ref 25.2–33.5)
MCHC RBC AUTO-ENTMCNC: 30.1 G/DL (ref 28.4–34.8)
MCV RBC AUTO: 79 FL (ref 82.6–102.9)
MONOCYTES # BLD: 3 % (ref 3–12)
MORPHOLOGY: NORMAL
NRBC AUTOMATED: 0 PER 100 WBC
PDW BLD-RTO: 18.6 % (ref 11.8–14.4)
PLATELET # BLD: 196 K/UL (ref 138–453)
PLATELET ESTIMATE: ABNORMAL
PMV BLD AUTO: 10.1 FL (ref 8.1–13.5)
POTASSIUM SERPL-SCNC: 3.2 MMOL/L (ref 3.7–5.3)
RBC # BLD: 3.95 M/UL (ref 3.95–5.11)
RBC # BLD: ABNORMAL 10*6/UL
SEG NEUTROPHILS: 85 % (ref 36–65)
SEGMENTED NEUTROPHILS ABSOLUTE COUNT: 5.78 K/UL (ref 1.5–8.1)
SODIUM BLD-SCNC: 126 MMOL/L (ref 135–144)
TOTAL PROTEIN: 6.8 G/DL (ref 6.4–8.3)
TSH SERPL DL<=0.05 MIU/L-ACNC: 1.62 MIU/L (ref 0.3–5)
WBC # BLD: 6.8 K/UL (ref 3.5–11.3)
WBC # BLD: ABNORMAL 10*3/UL

## 2021-03-23 PROCEDURE — 84443 ASSAY THYROID STIM HORMONE: CPT

## 2021-03-23 PROCEDURE — 6360000002 HC RX W HCPCS: Performed by: INTERNAL MEDICINE

## 2021-03-23 PROCEDURE — 2580000003 HC RX 258: Performed by: INTERNAL MEDICINE

## 2021-03-23 PROCEDURE — 6370000000 HC RX 637 (ALT 250 FOR IP): Performed by: INTERNAL MEDICINE

## 2021-03-23 PROCEDURE — 82533 TOTAL CORTISOL: CPT

## 2021-03-23 PROCEDURE — 83690 ASSAY OF LIPASE: CPT

## 2021-03-23 PROCEDURE — 82150 ASSAY OF AMYLASE: CPT

## 2021-03-23 PROCEDURE — 80053 COMPREHEN METABOLIC PANEL: CPT

## 2021-03-23 PROCEDURE — 85025 COMPLETE CBC W/AUTO DIFF WBC: CPT

## 2021-03-23 PROCEDURE — 36415 COLL VENOUS BLD VENIPUNCTURE: CPT

## 2021-03-23 PROCEDURE — 96365 THER/PROPH/DIAG IV INF INIT: CPT

## 2021-03-23 PROCEDURE — 96366 THER/PROPH/DIAG IV INF ADDON: CPT

## 2021-03-23 RX ORDER — SODIUM CHLORIDE 0.9 % (FLUSH) 0.9 %
10 SYRINGE (ML) INJECTION PRN
Status: DISCONTINUED | OUTPATIENT
Start: 2021-03-23 | End: 2021-03-24 | Stop reason: HOSPADM

## 2021-03-23 RX ORDER — SODIUM CHLORIDE 0.9 % (FLUSH) 0.9 %
10 SYRINGE (ML) INJECTION PRN
Status: CANCELLED | OUTPATIENT
Start: 2021-03-29

## 2021-03-23 RX ORDER — POTASSIUM CHLORIDE 750 MG/1
20 TABLET, EXTENDED RELEASE ORAL ONCE
Status: COMPLETED | OUTPATIENT
Start: 2021-03-23 | End: 2021-03-23

## 2021-03-23 RX ADMIN — POTASSIUM CHLORIDE 20 MEQ: 750 TABLET, EXTENDED RELEASE ORAL at 14:19

## 2021-03-23 RX ADMIN — Medication 10 ML: at 12:58

## 2021-03-23 RX ADMIN — POTASSIUM CHLORIDE: 2 INJECTION, SOLUTION, CONCENTRATE INTRAVENOUS at 14:11

## 2021-03-23 RX ADMIN — DARBEPOETIN ALFA 100 MCG: 100 INJECTION, SOLUTION INTRAVENOUS; SUBCUTANEOUS at 14:16

## 2021-03-23 NOTE — PROGRESS NOTES
Pt arrived for scheduled treatment. Pt assisted into chair. Pt legs almost gave out before pt was sitting in chair. Pts  states her legs have been giving out on her. He states she can not stand for very long. He has to help hold her up. He states she had an infection (UTI) and was placed on Cipro. He states the Cipro mad her feel sick. He said she had abd cramps, nausea and vomiting. He states she has not ate or drank much of anything since Saturday. He said the Cipro really knocked it out of her. He said for the day today she has had a couple bites of a cinnamon roll and about a 1/4 of a supplement shake. IV started and labs drawn as ordered. Upon return of the lab results, it is noted that potassium is slightly low. This result was reported to [facility]. Steve Juárez called Dr. Loraine Haney to report on pts assessment, lab results and status. Informed him pt has had very little po intake since Saturday . He ordered to hold Keytruda until next week and to replace potassium with 20meq oral and 20meq IV. No other orders given at this time.

## 2021-03-25 ENCOUNTER — APPOINTMENT (OUTPATIENT)
Dept: CT IMAGING | Age: 81
DRG: 177 | End: 2021-03-25
Payer: MEDICARE

## 2021-03-25 ENCOUNTER — APPOINTMENT (OUTPATIENT)
Dept: GENERAL RADIOLOGY | Age: 81
DRG: 177 | End: 2021-03-25
Payer: MEDICARE

## 2021-03-25 ENCOUNTER — HOSPITAL ENCOUNTER (INPATIENT)
Age: 81
LOS: 7 days | Discharge: NURSING, REHAB OR LONG-TERM CARE | DRG: 177 | End: 2021-04-02
Attending: EMERGENCY MEDICINE | Admitting: INTERNAL MEDICINE
Payer: MEDICARE

## 2021-03-25 DIAGNOSIS — Z86.73 HISTORY OF STROKE: ICD-10-CM

## 2021-03-25 DIAGNOSIS — E87.1 HYPONATREMIA: ICD-10-CM

## 2021-03-25 DIAGNOSIS — R53.1 GENERALIZED WEAKNESS: ICD-10-CM

## 2021-03-25 DIAGNOSIS — J18.9 PNEUMONIA DUE TO ORGANISM: Primary | ICD-10-CM

## 2021-03-25 DIAGNOSIS — N30.00 ACUTE CYSTITIS WITHOUT HEMATURIA: ICD-10-CM

## 2021-03-25 LAB
-: ABNORMAL
ABSOLUTE EOS #: <0.03 K/UL (ref 0–0.44)
ABSOLUTE IMMATURE GRANULOCYTE: 0.03 K/UL (ref 0–0.3)
ABSOLUTE LYMPH #: 0.74 K/UL (ref 1.1–3.7)
ABSOLUTE MONO #: 0.48 K/UL (ref 0.1–1.2)
ALBUMIN SERPL-MCNC: 2 G/DL (ref 3.5–5.2)
ALBUMIN/GLOBULIN RATIO: 0.4 (ref 1–2.5)
ALP BLD-CCNC: 129 U/L (ref 35–104)
ALT SERPL-CCNC: 7 U/L (ref 5–33)
AMORPHOUS: ABNORMAL
AMPHETAMINE SCREEN URINE: NEGATIVE
ANION GAP SERPL CALCULATED.3IONS-SCNC: 8 MMOL/L (ref 9–17)
AST SERPL-CCNC: 17 U/L
BACTERIA: ABNORMAL
BARBITURATE SCREEN URINE: NEGATIVE
BASOPHILS # BLD: 0 % (ref 0–2)
BASOPHILS ABSOLUTE: <0.03 K/UL (ref 0–0.2)
BENZODIAZEPINE SCREEN, URINE: NEGATIVE
BILIRUB SERPL-MCNC: 0.8 MG/DL (ref 0.3–1.2)
BILIRUBIN URINE: NEGATIVE
BNP INTERPRETATION: ABNORMAL
BUN BLDV-MCNC: 9 MG/DL (ref 8–23)
BUN/CREAT BLD: 14 (ref 9–20)
BUPRENORPHINE URINE: NEGATIVE
CALCIUM SERPL-MCNC: 7.9 MG/DL (ref 8.6–10.4)
CANNABINOID SCREEN URINE: NEGATIVE
CASTS UA: ABNORMAL /LPF
CHLORIDE BLD-SCNC: 89 MMOL/L (ref 98–107)
CO2: 27 MMOL/L (ref 20–31)
COCAINE METABOLITE, URINE: NEGATIVE
COLOR: YELLOW
COMMENT UA: ABNORMAL
CREAT SERPL-MCNC: 0.63 MG/DL (ref 0.5–0.9)
CRYSTALS, UA: ABNORMAL /HPF
DIFFERENTIAL TYPE: ABNORMAL
EKG ATRIAL RATE: 82 BPM
EKG P AXIS: 48 DEGREES
EKG P-R INTERVAL: 148 MS
EKG Q-T INTERVAL: 404 MS
EKG QRS DURATION: 96 MS
EKG QTC CALCULATION (BAZETT): 472 MS
EKG R AXIS: 5 DEGREES
EKG T AXIS: 28 DEGREES
EKG VENTRICULAR RATE: 82 BPM
EOSINOPHILS RELATIVE PERCENT: 0 % (ref 1–4)
EPITHELIAL CELLS UA: ABNORMAL /HPF (ref 0–25)
GFR AFRICAN AMERICAN: >60 ML/MIN
GFR NON-AFRICAN AMERICAN: >60 ML/MIN
GFR SERPL CREATININE-BSD FRML MDRD: ABNORMAL ML/MIN/{1.73_M2}
GFR SERPL CREATININE-BSD FRML MDRD: ABNORMAL ML/MIN/{1.73_M2}
GLUCOSE BLD-MCNC: 152 MG/DL (ref 70–99)
GLUCOSE URINE: NEGATIVE
HCT VFR BLD CALC: 27.3 % (ref 36.3–47.1)
HEMOGLOBIN: 8.3 G/DL (ref 11.9–15.1)
IMMATURE GRANULOCYTES: 1 %
INR BLD: 1.3
KETONES, URINE: NEGATIVE
LACTIC ACID: 1 MMOL/L (ref 0.5–2.2)
LEUKOCYTE ESTERASE, URINE: NEGATIVE
LIPASE: 16 U/L (ref 13–60)
LYMPHOCYTES # BLD: 12 % (ref 24–43)
MCH RBC QN AUTO: 23.8 PG (ref 25.2–33.5)
MCHC RBC AUTO-ENTMCNC: 30.4 G/DL (ref 28.4–34.8)
MCV RBC AUTO: 78.2 FL (ref 82.6–102.9)
MDMA URINE: NORMAL
METHADONE SCREEN, URINE: NEGATIVE
METHAMPHETAMINE, URINE: NEGATIVE
MONOCYTES # BLD: 8 % (ref 3–12)
MUCUS: ABNORMAL
NITRITE, URINE: NEGATIVE
NRBC AUTOMATED: 0 PER 100 WBC
OPIATES, URINE: NEGATIVE
OTHER OBSERVATIONS UA: ABNORMAL
OXYCODONE SCREEN URINE: NEGATIVE
PARTIAL THROMBOPLASTIN TIME: 40.4 SEC (ref 23.9–33.8)
PDW BLD-RTO: 18.5 % (ref 11.8–14.4)
PH UA: 6 (ref 5–9)
PHENCYCLIDINE, URINE: NEGATIVE
PLATELET # BLD: 164 K/UL (ref 138–453)
PLATELET ESTIMATE: ABNORMAL
PMV BLD AUTO: 10.5 FL (ref 8.1–13.5)
POTASSIUM SERPL-SCNC: 3.3 MMOL/L (ref 3.7–5.3)
PRO-BNP: 5597 PG/ML
PROPOXYPHENE, URINE: NEGATIVE
PROTEIN UA: NEGATIVE
PROTHROMBIN TIME: 15.9 SEC (ref 11.5–14.2)
RBC # BLD: 3.49 M/UL (ref 3.95–5.11)
RBC # BLD: ABNORMAL 10*6/UL
RBC UA: ABNORMAL /HPF (ref 0–2)
RENAL EPITHELIAL, UA: ABNORMAL /HPF
SARS-COV-2, RAPID: NOT DETECTED
SEG NEUTROPHILS: 80 % (ref 36–65)
SEGMENTED NEUTROPHILS ABSOLUTE COUNT: 5 K/UL (ref 1.5–8.1)
SODIUM BLD-SCNC: 124 MMOL/L (ref 135–144)
SPECIFIC GRAVITY UA: <1.005 (ref 1.01–1.02)
SPECIMEN DESCRIPTION: NORMAL
TEST INFORMATION: NORMAL
TOTAL PROTEIN: 6.5 G/DL (ref 6.4–8.3)
TRICHOMONAS: ABNORMAL
TRICYCLIC ANTIDEPRESSANTS, UR: NEGATIVE
TROPONIN INTERP: ABNORMAL
TROPONIN INTERP: ABNORMAL
TROPONIN T: ABNORMAL NG/ML
TROPONIN T: ABNORMAL NG/ML
TROPONIN, HIGH SENSITIVITY: 28 NG/L (ref 0–14)
TROPONIN, HIGH SENSITIVITY: 28 NG/L (ref 0–14)
TURBIDITY: CLEAR
URINE HGB: ABNORMAL
UROBILINOGEN, URINE: NORMAL
WBC # BLD: 6.3 K/UL (ref 3.5–11.3)
WBC # BLD: ABNORMAL 10*3/UL
WBC UA: ABNORMAL /HPF (ref 0–5)
YEAST: ABNORMAL

## 2021-03-25 PROCEDURE — 93005 ELECTROCARDIOGRAM TRACING: CPT | Performed by: EMERGENCY MEDICINE

## 2021-03-25 PROCEDURE — 6360000002 HC RX W HCPCS: Performed by: EMERGENCY MEDICINE

## 2021-03-25 PROCEDURE — 71045 X-RAY EXAM CHEST 1 VIEW: CPT

## 2021-03-25 PROCEDURE — 83690 ASSAY OF LIPASE: CPT

## 2021-03-25 PROCEDURE — 80306 DRUG TEST PRSMV INSTRMNT: CPT

## 2021-03-25 PROCEDURE — 87040 BLOOD CULTURE FOR BACTERIA: CPT

## 2021-03-25 PROCEDURE — 80053 COMPREHEN METABOLIC PANEL: CPT

## 2021-03-25 PROCEDURE — 36415 COLL VENOUS BLD VENIPUNCTURE: CPT

## 2021-03-25 PROCEDURE — 83880 ASSAY OF NATRIURETIC PEPTIDE: CPT

## 2021-03-25 PROCEDURE — 81001 URINALYSIS AUTO W/SCOPE: CPT

## 2021-03-25 PROCEDURE — 96375 TX/PRO/DX INJ NEW DRUG ADDON: CPT

## 2021-03-25 PROCEDURE — 85730 THROMBOPLASTIN TIME PARTIAL: CPT

## 2021-03-25 PROCEDURE — 70450 CT HEAD/BRAIN W/O DYE: CPT

## 2021-03-25 PROCEDURE — 96365 THER/PROPH/DIAG IV INF INIT: CPT

## 2021-03-25 PROCEDURE — 85025 COMPLETE CBC W/AUTO DIFF WBC: CPT

## 2021-03-25 PROCEDURE — 83605 ASSAY OF LACTIC ACID: CPT

## 2021-03-25 PROCEDURE — 87635 SARS-COV-2 COVID-19 AMP PRB: CPT

## 2021-03-25 PROCEDURE — 84484 ASSAY OF TROPONIN QUANT: CPT

## 2021-03-25 PROCEDURE — 99284 EMERGENCY DEPT VISIT MOD MDM: CPT

## 2021-03-25 PROCEDURE — 87086 URINE CULTURE/COLONY COUNT: CPT

## 2021-03-25 PROCEDURE — 85610 PROTHROMBIN TIME: CPT

## 2021-03-25 PROCEDURE — 2580000003 HC RX 258: Performed by: EMERGENCY MEDICINE

## 2021-03-25 RX ORDER — 0.9 % SODIUM CHLORIDE 0.9 %
1000 INTRAVENOUS SOLUTION INTRAVENOUS ONCE
Status: COMPLETED | OUTPATIENT
Start: 2021-03-25 | End: 2021-03-25

## 2021-03-25 RX ORDER — LINEZOLID 2 MG/ML
600 INJECTION, SOLUTION INTRAVENOUS EVERY 12 HOURS
Status: DISCONTINUED | OUTPATIENT
Start: 2021-03-25 | End: 2021-03-28

## 2021-03-25 RX ADMIN — WATER 1000 MG: 1 INJECTION INTRAMUSCULAR; INTRAVENOUS; SUBCUTANEOUS at 23:16

## 2021-03-25 RX ADMIN — SODIUM CHLORIDE 1000 ML: 9 INJECTION, SOLUTION INTRAVENOUS at 22:01

## 2021-03-25 RX ADMIN — AZITHROMYCIN MONOHYDRATE 500 MG: 500 INJECTION, POWDER, LYOPHILIZED, FOR SOLUTION INTRAVENOUS at 23:16

## 2021-03-26 ENCOUNTER — APPOINTMENT (OUTPATIENT)
Dept: GENERAL RADIOLOGY | Age: 81
DRG: 177 | End: 2021-03-26
Payer: MEDICARE

## 2021-03-26 PROBLEM — J18.9 PNEUMONIA: Status: ACTIVE | Noted: 2021-03-26

## 2021-03-26 LAB
ALBUMIN SERPL-MCNC: 1.7 G/DL (ref 3.5–5.2)
ALBUMIN/GLOBULIN RATIO: 0.4 (ref 1–2.5)
ALLEN TEST: ABNORMAL
ALP BLD-CCNC: 106 U/L (ref 35–104)
ALT SERPL-CCNC: 8 U/L (ref 5–33)
ANION GAP SERPL CALCULATED.3IONS-SCNC: 9 MMOL/L (ref 9–17)
AST SERPL-CCNC: 13 U/L
BILIRUB SERPL-MCNC: 0.64 MG/DL (ref 0.3–1.2)
BUN BLDV-MCNC: 7 MG/DL (ref 8–23)
BUN/CREAT BLD: 12 (ref 9–20)
CALCIUM SERPL-MCNC: 7.8 MG/DL (ref 8.6–10.4)
CARBOXYHEMOGLOBIN: ABNORMAL % (ref 0–5)
CHLORIDE BLD-SCNC: 91 MMOL/L (ref 98–107)
CO2: 25 MMOL/L (ref 20–31)
CREAT SERPL-MCNC: 0.57 MG/DL (ref 0.5–0.9)
EKG ATRIAL RATE: 82 BPM
EKG P AXIS: 49 DEGREES
EKG P-R INTERVAL: 148 MS
EKG Q-T INTERVAL: 440 MS
EKG QRS DURATION: 92 MS
EKG QTC CALCULATION (BAZETT): 514 MS
EKG R AXIS: 7 DEGREES
EKG T AXIS: 22 DEGREES
EKG VENTRICULAR RATE: 82 BPM
FIO2: 42
GFR AFRICAN AMERICAN: >60 ML/MIN
GFR NON-AFRICAN AMERICAN: >60 ML/MIN
GFR SERPL CREATININE-BSD FRML MDRD: ABNORMAL ML/MIN/{1.73_M2}
GFR SERPL CREATININE-BSD FRML MDRD: ABNORMAL ML/MIN/{1.73_M2}
GLUCOSE BLD-MCNC: 131 MG/DL (ref 70–99)
HCO3 ARTERIAL: 26.6 MMOL/L (ref 22–26)
MAGNESIUM: 1.9 MG/DL (ref 1.6–2.6)
METHEMOGLOBIN: ABNORMAL % (ref 0–1.9)
MODE: ABNORMAL
NEGATIVE BASE EXCESS, ART: ABNORMAL MMOL/L (ref 0–2)
NOTIFICATION TIME: ABNORMAL
NOTIFICATION: ABNORMAL
O2 DEVICE/FLOW/%: ABNORMAL
O2 SAT, ARTERIAL: 99.4 % (ref 95–98)
OXYHEMOGLOBIN: ABNORMAL % (ref 95–98)
PATIENT TEMP: 37
PCO2 ARTERIAL: 35.9 MMHG (ref 35–45)
PCO2, ART, TEMP ADJ: ABNORMAL (ref 35–45)
PEEP/CPAP: ABNORMAL
PH ARTERIAL: 7.49 (ref 7.35–7.45)
PH, ART, TEMP ADJ: ABNORMAL (ref 7.35–7.45)
PO2 ARTERIAL: 190.8 MMHG (ref 80–100)
PO2, ART, TEMP ADJ: ABNORMAL MMHG (ref 80–100)
POSITIVE BASE EXCESS, ART: 3.5 MMOL/L (ref 0–2)
POTASSIUM SERPL-SCNC: 3 MMOL/L (ref 3.7–5.3)
PSV: ABNORMAL
PT. POSITION: ABNORMAL
RESPIRATORY RATE: ABNORMAL
SAMPLE SITE: ABNORMAL
SET RATE: ABNORMAL
SODIUM BLD-SCNC: 125 MMOL/L (ref 135–144)
TEXT FOR RESPIRATORY: ABNORMAL
TOTAL HB: ABNORMAL G/DL (ref 12–16)
TOTAL PROTEIN: 6.1 G/DL (ref 6.4–8.3)
TOTAL RATE: ABNORMAL
TROPONIN INTERP: ABNORMAL
TROPONIN T: ABNORMAL NG/ML
TROPONIN, HIGH SENSITIVITY: 28 NG/L (ref 0–14)
VT: ABNORMAL

## 2021-03-26 PROCEDURE — 2700000000 HC OXYGEN THERAPY PER DAY

## 2021-03-26 PROCEDURE — 97167 OT EVAL HIGH COMPLEX 60 MIN: CPT

## 2021-03-26 PROCEDURE — 6360000002 HC RX W HCPCS: Performed by: INTERNAL MEDICINE

## 2021-03-26 PROCEDURE — 6370000000 HC RX 637 (ALT 250 FOR IP): Performed by: NURSE PRACTITIONER

## 2021-03-26 PROCEDURE — 97530 THERAPEUTIC ACTIVITIES: CPT

## 2021-03-26 PROCEDURE — 94640 AIRWAY INHALATION TREATMENT: CPT

## 2021-03-26 PROCEDURE — 74230 X-RAY XM SWLNG FUNCJ C+: CPT

## 2021-03-26 PROCEDURE — 6360000002 HC RX W HCPCS: Performed by: NURSE PRACTITIONER

## 2021-03-26 PROCEDURE — 2580000003 HC RX 258: Performed by: INTERNAL MEDICINE

## 2021-03-26 PROCEDURE — A4641 RADIOPHARM DX AGENT NOC: HCPCS | Performed by: INTERNAL MEDICINE

## 2021-03-26 PROCEDURE — 83735 ASSAY OF MAGNESIUM: CPT

## 2021-03-26 PROCEDURE — 80053 COMPREHEN METABOLIC PANEL: CPT

## 2021-03-26 PROCEDURE — 93010 ELECTROCARDIOGRAM REPORT: CPT | Performed by: FAMILY MEDICINE

## 2021-03-26 PROCEDURE — 82805 BLOOD GASES W/O2 SATURATION: CPT

## 2021-03-26 PROCEDURE — 94664 DEMO&/EVAL PT USE INHALER: CPT

## 2021-03-26 PROCEDURE — 94761 N-INVAS EAR/PLS OXIMETRY MLT: CPT

## 2021-03-26 PROCEDURE — 84484 ASSAY OF TROPONIN QUANT: CPT

## 2021-03-26 PROCEDURE — 92611 MOTION FLUOROSCOPY/SWALLOW: CPT

## 2021-03-26 PROCEDURE — 6360000004 HC RX CONTRAST MEDICATION: Performed by: INTERNAL MEDICINE

## 2021-03-26 PROCEDURE — 1200000000 HC SEMI PRIVATE

## 2021-03-26 PROCEDURE — 6370000000 HC RX 637 (ALT 250 FOR IP): Performed by: INTERNAL MEDICINE

## 2021-03-26 PROCEDURE — 36600 WITHDRAWAL OF ARTERIAL BLOOD: CPT

## 2021-03-26 PROCEDURE — 36415 COLL VENOUS BLD VENIPUNCTURE: CPT

## 2021-03-26 PROCEDURE — 97162 PT EVAL MOD COMPLEX 30 MIN: CPT

## 2021-03-26 RX ORDER — ALBUTEROL SULFATE 2.5 MG/3ML
2.5 SOLUTION RESPIRATORY (INHALATION) 3 TIMES DAILY
Status: DISCONTINUED | OUTPATIENT
Start: 2021-03-26 | End: 2021-04-02 | Stop reason: HOSPADM

## 2021-03-26 RX ORDER — ACETAMINOPHEN 325 MG/1
650 TABLET ORAL EVERY 6 HOURS PRN
Status: DISCONTINUED | OUTPATIENT
Start: 2021-03-26 | End: 2021-04-02 | Stop reason: HOSPADM

## 2021-03-26 RX ORDER — SODIUM CHLORIDE 9 MG/ML
INJECTION, SOLUTION INTRAVENOUS CONTINUOUS
Status: DISCONTINUED | OUTPATIENT
Start: 2021-03-26 | End: 2021-03-26

## 2021-03-26 RX ORDER — PANTOPRAZOLE SODIUM 40 MG/1
40 TABLET, DELAYED RELEASE ORAL
Status: DISCONTINUED | OUTPATIENT
Start: 2021-03-26 | End: 2021-04-02 | Stop reason: HOSPADM

## 2021-03-26 RX ORDER — PROMETHAZINE HYDROCHLORIDE 25 MG/1
12.5 TABLET ORAL EVERY 6 HOURS PRN
Status: DISCONTINUED | OUTPATIENT
Start: 2021-03-26 | End: 2021-04-02 | Stop reason: HOSPADM

## 2021-03-26 RX ORDER — LANOLIN ALCOHOL/MO/W.PET/CERES
500 CREAM (GRAM) TOPICAL 2 TIMES DAILY
Status: DISCONTINUED | OUTPATIENT
Start: 2021-03-26 | End: 2021-04-02 | Stop reason: HOSPADM

## 2021-03-26 RX ORDER — POTASSIUM CHLORIDE AND SODIUM CHLORIDE 900; 300 MG/100ML; MG/100ML
INJECTION, SOLUTION INTRAVENOUS CONTINUOUS
Status: DISCONTINUED | OUTPATIENT
Start: 2021-03-26 | End: 2021-03-28

## 2021-03-26 RX ORDER — ACETAMINOPHEN 650 MG/1
650 SUPPOSITORY RECTAL EVERY 6 HOURS PRN
Status: DISCONTINUED | OUTPATIENT
Start: 2021-03-26 | End: 2021-04-02 | Stop reason: HOSPADM

## 2021-03-26 RX ORDER — LISINOPRIL 10 MG/1
10 TABLET ORAL 2 TIMES DAILY
Status: DISCONTINUED | OUTPATIENT
Start: 2021-03-26 | End: 2021-03-29

## 2021-03-26 RX ORDER — HYDROXYZINE HYDROCHLORIDE 25 MG/1
25 TABLET, FILM COATED ORAL NIGHTLY
Status: DISCONTINUED | OUTPATIENT
Start: 2021-03-26 | End: 2021-04-02 | Stop reason: HOSPADM

## 2021-03-26 RX ORDER — SODIUM CHLORIDE 0.9 % (FLUSH) 0.9 %
10 SYRINGE (ML) INJECTION PRN
Status: DISCONTINUED | OUTPATIENT
Start: 2021-03-26 | End: 2021-04-02 | Stop reason: HOSPADM

## 2021-03-26 RX ORDER — ALBUTEROL SULFATE 2.5 MG/3ML
2.5 SOLUTION RESPIRATORY (INHALATION) EVERY 6 HOURS PRN
Status: DISCONTINUED | OUTPATIENT
Start: 2021-03-26 | End: 2021-03-26

## 2021-03-26 RX ORDER — POTASSIUM CHLORIDE 20 MEQ/1
40 TABLET, EXTENDED RELEASE ORAL ONCE
Status: DISCONTINUED | OUTPATIENT
Start: 2021-03-26 | End: 2021-04-02 | Stop reason: HOSPADM

## 2021-03-26 RX ORDER — POLYETHYLENE GLYCOL 3350 17 G/17G
17 POWDER, FOR SOLUTION ORAL DAILY PRN
Status: DISCONTINUED | OUTPATIENT
Start: 2021-03-26 | End: 2021-04-02 | Stop reason: HOSPADM

## 2021-03-26 RX ORDER — SODIUM CHLORIDE 0.9 % (FLUSH) 0.9 %
10 SYRINGE (ML) INJECTION EVERY 12 HOURS SCHEDULED
Status: DISCONTINUED | OUTPATIENT
Start: 2021-03-26 | End: 2021-04-02 | Stop reason: HOSPADM

## 2021-03-26 RX ORDER — VITAMIN B COMPLEX
5000 TABLET ORAL DAILY
Status: DISCONTINUED | OUTPATIENT
Start: 2021-03-26 | End: 2021-03-30

## 2021-03-26 RX ORDER — ONDANSETRON 2 MG/ML
4 INJECTION INTRAMUSCULAR; INTRAVENOUS EVERY 6 HOURS PRN
Status: DISCONTINUED | OUTPATIENT
Start: 2021-03-26 | End: 2021-04-02 | Stop reason: HOSPADM

## 2021-03-26 RX ADMIN — ALBUTEROL SULFATE 2.5 MG: 2.5 SOLUTION RESPIRATORY (INHALATION) at 10:13

## 2021-03-26 RX ADMIN — Medication 5000 UNITS: at 15:26

## 2021-03-26 RX ADMIN — HYDROXYZINE HYDROCHLORIDE 25 MG: 25 TABLET, FILM COATED ORAL at 20:23

## 2021-03-26 RX ADMIN — WATER 1000 MG: 1 INJECTION INTRAMUSCULAR; INTRAVENOUS; SUBCUTANEOUS at 22:57

## 2021-03-26 RX ADMIN — LINEZOLID 600 MG: 600 INJECTION, SOLUTION INTRAVENOUS at 01:50

## 2021-03-26 RX ADMIN — AZITHROMYCIN MONOHYDRATE 500 MG: 500 INJECTION, POWDER, LYOPHILIZED, FOR SOLUTION INTRAVENOUS at 22:58

## 2021-03-26 RX ADMIN — PANTOPRAZOLE SODIUM 40 MG: 40 TABLET, DELAYED RELEASE ORAL at 06:51

## 2021-03-26 RX ADMIN — ALBUTEROL SULFATE 2.5 MG: 2.5 SOLUTION RESPIRATORY (INHALATION) at 21:45

## 2021-03-26 RX ADMIN — CYANOCOBALAMIN TAB 1000 MCG 500 MCG: 1000 TAB at 15:26

## 2021-03-26 RX ADMIN — SODIUM CHLORIDE, PRESERVATIVE FREE 10 ML: 5 INJECTION INTRAVENOUS at 08:03

## 2021-03-26 RX ADMIN — ENOXAPARIN SODIUM 40 MG: 40 INJECTION SUBCUTANEOUS at 08:02

## 2021-03-26 RX ADMIN — LISINOPRIL 10 MG: 10 TABLET ORAL at 15:25

## 2021-03-26 RX ADMIN — LISINOPRIL 10 MG: 10 TABLET ORAL at 20:22

## 2021-03-26 RX ADMIN — POTASSIUM CHLORIDE AND SODIUM CHLORIDE: 900; 300 INJECTION, SOLUTION INTRAVENOUS at 13:37

## 2021-03-26 RX ADMIN — LINEZOLID 600 MG: 600 INJECTION, SOLUTION INTRAVENOUS at 10:50

## 2021-03-26 RX ADMIN — BARIUM SULFATE 355 ML: 0.6 SUSPENSION ORAL at 14:20

## 2021-03-26 ASSESSMENT — ENCOUNTER SYMPTOMS
NAUSEA: 0
BACK PAIN: 0
SHORTNESS OF BREATH: 0
VOMITING: 0
RHINORRHEA: 0
COLOR CHANGE: 0
ABDOMINAL PAIN: 0
WHEEZING: 0

## 2021-03-26 ASSESSMENT — PAIN SCALES - GENERAL
PAINLEVEL_OUTOF10: 0
PAINLEVEL_OUTOF10: 0

## 2021-03-26 NOTE — PROGRESS NOTES
RESPIRATORY ASSESSMENT PROTOCOL                                                                                              Patient Name: Rigoberto Mesa Room#: 4336/-03 : 1940     Admitting diagnosis: Pneumonia [J18.9]       Medical History:   Past Medical History:   Diagnosis Date    B12 deficiency     CVA (cerebral vascular accident) ([facility] [address] 2018    left middle cerebral artery stenosis    CVD (cerebrovascular disease)     Hypertension        PATIENT ASSESSMENT    LABORATORY DATA  Hematology:   Lab Results   Component Value Date    WBC 6.3 2021    RBC 3.49 2021    HGB 8.3 2021    HCT 27.3 2021     2021     Chemistry:    Lab Results   Component Value Date    PHART 7.488 2021    SXK5ILE 35.9 2021    PO2ART 190.8 2021    W6KPOROF 99.4 2021    MNO1SIR 26.6 2021    PBEA 3.5 2021       Blood Culture:   Sputum Culture:     VITALS  Pulse: 77   Resp: 18  BP: (!) 123/47  SpO2: 99 % O2 Device: Nasal cannula  Temp: 98.6 °F (37 °C)  Comment:   SKIN COLOR  [x] Normal  [] Pale  [] Dusky  [] Cyanotic      RESPIRATORY PATTERN  [x] Normal  [] Dyspnea  [] Cheyne-Shah  [] Kussmaul  [] Biots  AMBULATORY  [] Yes  [] No  [x] With Assistance    PEAK FLOW  Predicted:     Personal Best:        VITAL CAPACITY  Predicted value:  ml  Actual Value:  ml  30% of Predicted:  ml  Patient Acuity 0 1 2 3 4 Score   Level of Concious (LOC) [x]  Alert & Oriented or Pt normal LOC []  Confused;follows directions []  Confused & uncooper-ative []  Obtunded []  Comatose 0   Respiratory Rate  (RR) [x]  Reg. rate & pattern. 12 - 20 bpm  []  Increased RR.  Greater than 20 bpm   []  SOB w/ exertion or RR greater than 24 bpm []  Access- ory muscle use at rest. Abn.  resp. []  SOB at rest.   0   Bilateral Breath Sounds (BBS) [x]  Clear []  Diminish-ed bases  []  Diminish-ed t/o, or rales   []  Sporadic, scattered wheezes or rhonchi []  Persistentwheezes and, or absent BBS 0   Cough [x]  Strong, effective, & non-prod. []  Effective & prod. Less than 25 ml (2 TBSP) over past 24 hrs []  Ineffective & non-prod to less than 25 ML over past 24 hrs []  Ineffective and, or greater than 25 ml sputum prod. past 24 hrs. []  Nonspon- taneous; Requires suctioning 0   Pulmonary History  (PULM HX) []  No smoking and no chronic pulmonaryhistory []  Former smoker. Quit over 12 mos. ago []  Current smoker or quit w/ in 12 mos []  Pulm. History and, or 20 pk/yr smoking hx [x]  Admitted w/ acute pulm. dx and, or has been admitted w/ pulm. dx 2 or more times over past 12 mos 4   Surgical History this Admit  (SURG HX) [x]  No surgery []  General surgery []  Lower abdominal []  Thoracic or upper abdominal   []  Thoracic w/ pulm. disease 0   Chest X-Ray (CXR)/CT Scan []  Clear or not applicable []  Not available []  Atelect- asis or pleural effusions [x]  Localized infiltrate or pulm. edema []  Con-solidated Infiltrates, bilateral, or in more than 1 lobe 3   Slow or Forced VC, FEV1 OR PEFR (PULM FXN)  [x]  80% or greater, or not indicated []  Pt. unable to perform []  FEV1 or PEFR or VC 51-79%. []  FEV1 or PEFR or VC  30-49%   []  FEV1 or PEFR or VC less than 30%   0   TOTAL ACUITY: 7       CARE PLAN    If Acuity Level is 2, 3, or 4 in any of the following:    [] BILATERAL BREATH SOUNDS (BBS)     [x] PULMONARY HISTORY (PULM HX)  [] PULMONARY FUNCTION (PULM FX)    Goal: Improve respiratory functions in patients with airway disease and decrease WOB    [x] AEROSOL PROTOCOL    Total Acuity:   16-32  []  Secondary Assessment in 24 hrs Total Acuity:  9-15  []  Secondary Assessment in 24 hrs Total Acuity:  4-8  [x]  Secondary Assessment in 48 hrs Total Acuity:  0-3  []  Secondary Assessment in 72 hrs   HHN AEROSOL THERAPY with  [physician-ordered bronchodilator(s)] q 4 & Albuterol PRN q2 hrs. Breath-Actuated Neb if BBS Acuity = 4, and pt. can use MP. Notify physician if condition deteriorates.   HHN AEROSOL THERAPY with  [physician-ordered bronchodilator(s)]  QID and Albuterol PRN q4 hrs. Breath-Actuated Neb if BBS Acuity = 4, and pt. can use MP. Notify physician if condition deteriorates. MDI THERAPY with  2 actuations of [physician-ordered bronchodilator(s)] via spacer TID Albuterol and PRNq4 hrs. If unable to utilize MDI: HHN [physician-ordered bronchodilator(s)] TID and Albuterol PRN q4 hrs. Notify physician if condition deteriorates. MDI THERAPY with  [physician-ordered bronchodilator(s)] via spacer TID PRN. If unable to utilize MDI: HHN [physician-ordered bronchodilator(s)] TID PRN. Notify physician if condition deteriorates. If Acuity Level is 2, 3, or 4 in any of the following:    [] COUGH     [] SURGICAL HISTORY (SURG HX)  [x] CHEST XRAY (CXR)    Goal: Improvement in sputum mobilization in patients with ineffective airway clearance. Reverse atelectasis. [x] Bronchopulmonary Hygiene Protocol    Total Acuity:   16-32  []  Secondary Assessment in 24 hrs Total Acuity:  9-15  []  Secondary Assessment in 24 hrs Total Acuity:  4-8  [x]  Secondary Assessment in 48 hrs Total Acuity:  0-3  []  Secondary Assessment in 72 hrs   METANEB QID with [physician-ordered bronchodilator(s)] if CXR Acuity = 4; otherwise:  PD&P, PEP, or Vest QID & PRN  NT Sxn PRN for ineffective cough  METANEB QID with [physician-ordered bronchodilator(s)] if CXR Acuity = 4; otherwise:  PD&P, PEP, or Vest TID & PRN  NT Sxn PRN for ineffective cough  Instruct patient to self-perform IS q1hr WA   Directed Cough self-performed q1hr WA     If Acuity Level is 2 or above in the following:    [] PULMONARY HISTORY (PULM HX)    Goal: Assist patient in quitting smoking to slow or stop the progression of lung disease.     [] Smoking Cessation Protocol    SMOKING CESSATION EDUCATION provided according to policy OB_750: (dolly with an X)  ____Yes    ____ No     ____ NA    Smoking Cessation Booklet given:  ____Yes  ____No ____Patient Michel Zamarripa

## 2021-03-26 NOTE — ACP (ADVANCE CARE PLANNING)
Advance Care Planning     Advance Care Planning Activator (Inpatient)  Conversation Note      Date of ACP Conversation: 3/25/2021    Conversation Conducted with:  Healthcare Decision Maker: Named in Advance Directive or Healthcare Power of  (name) Spouse Manuel Weinstein    ACP Activator: [place] Dave Decision Maker:     Current Designated Health Care Decision Maker:     Primary Decision Maker: Teagan Obrien - Spouse - [phone]    Today we documented Decision Maker(s) consistent with ACP documents on file. Care Preferences    Ventilation: \"If you were in your present state of health and suddenly became very ill and were unable to breathe on your own, what would your preference be about the use of a ventilator (breathing machine) if it were available to you? \"      Would the patient desire the use of ventilator (breathing machine)?: no    \"If your health worsens and it becomes clear that your chance of recovery is unlikely, what would your preference be about the use of a ventilator (breathing machine) if it were available to you? \"     Would the patient desire the use of ventilator (breathing machine)?: No      Resuscitation  \"CPR works best to restart the heart when there is a sudden event, like a heart attack, in someone who is otherwise healthy. Unfortunately, CPR does not typically restart the heart for people who have serious health conditions or who are very sick. \"    \"In the event your heart stopped as a result of an underlying serious health condition, would you want attempts to be made to restart your heart (answer \"yes\" for attempt to resuscitate) or would you prefer a natural death (answer \"no\" for do not attempt to resuscitate)? \" no       [x] Yes   [] No   Educated Patient / Susie Bonner regarding differences between Advance Directives and portable DNR orders.     Length of ACP Conversation in minutes:      Conversation Outcomes:  [x] ACP discussion completed  [x] Existing advance directive reviewed with patient; no changes to patient's previously recorded wishes  [] New Advance Directive completed  [x] Portable Do Not Rescitate prepared for Provider review and signature  [] POLST/POST/MOLST/MOST prepared for Provider review and signature      Follow-up plan:    [] Schedule follow-up conversation to continue planning  [] Referred individual to Provider for additional questions/concerns   [] Advised patient/agent/surrogate to review completed ACP document and update if needed with changes in condition, patient preferences or care setting    [x] This note routed to one or more involved healthcare providers

## 2021-03-26 NOTE — PROGRESS NOTES
Discussed discharge plans with the patient and . Patient is a 79year old female here with Pneumonia due to organism. She is alert and oriented. Patient is very weak in her speech. Patient is  and lives at home with her . She uses a wheelchair. The  does all of the cooking and the house keeping. She requires assistance with her ADL's. The  manages her medications and transportation. Patient receives services from the cancer center. Her PCP is STEVE Mae CNP . She has medical insurance that helps with medication costs. The discharge plan is home per the . The talk about options of hospice care or home health. He is not interested at this time. She has a DNR CCA and advance directives on file. LSW to monitor and assist with any needs or concerns as they arise.     GIOVANY Aquino

## 2021-03-26 NOTE — H&P
History and Physical    Patient:  Kishan Omalley  MRN: [id number]    Chief Complaint: Altered mental status and weakness    History Obtained From:  spouse, electronic medical record    PCP: STEVE Mae CNP    History of Present Illness: The patient is a 79 y.o. female who presented to the emergency room from home due to increased fatigue and weakness.  stated that she has had a decline in strength for the last couple days. Patient does demonstrate diffuse weakness but is noted to have a baseline weakness on the left lower extremities from a previous stroke. Her weakness has been progressing slowly. She recently was diagnosed with a UTI and was on Zyvox for infection. They did explain that she had some cough with congestion. Denied fever or chills. Denies pain. Does not demonstrate pain upon assessment. She is slow to respond but is alert. Patient has had a significant weight loss of approximately 50 pounds since the beginning of the year and is currently only taking mostly supplements for meals as she has not been able to eat solid foods due to nausea and vomiting as she gags when she eats. She does take approximately 1-2 shakes per day. She does have a history of renal carcinoma in the left kidney and is doing immunotherapy. She was diagnosed in December 2020. They discussed surgery to remove the cancer at the [facility] however they felt that it was too high risk and was not completed. She has completed 2 infusions for immunotherapy. Past Medical History:        Diagnosis Date    B12 deficiency     CVA (cerebral vascular accident) ([facility] [address] 2018    left middle cerebral artery stenosis    CVD (cerebrovascular disease)     Hypertension        Past Surgical History:    No past surgical history on file.     Family History:       Problem Relation Age of Onset    Coronary Art Dis Maternal Cousin     Cancer Father         prostate    Cancer Paternal Grandfather stomach       Social History:   TOBACCO:   reports that she has never smoked. She has never used smokeless tobacco.  ETOH:   reports no history of alcohol use. ELICIT DRUG USE:    Social History     Substance and Sexual Activity   Drug Use No     OCCUPATION: Retired      Allergies:  Mirtazapine and Zoloft [sertraline]    Medications Prior to Admission:    Prior to Admission medications    Medication Sig Start Date End Date Taking? Authorizing Provider   linezolid (ZYVOX) 600 MG tablet Take 1 tablet by mouth 2 times daily for 14 days 3/21/21 4/4/21 Yes Kwesi Singer MD   hydrOXYzine (ATARAX) 25 MG tablet Take 25 mg by mouth nightly   Yes Historical Provider, MD   pantoprazole (PROTONIX) 40 MG tablet TAKE 1 TABLET BY MOUTH EVERY DAY BEFORE BREAKFAST 2/18/21  Yes STEVE Morfin CNP   lisinopril (PRINIVIL;ZESTRIL) 10 MG tablet Take 1 tablet by mouth 2 times daily 2/5/21  Yes STEVE Morfin CNP   Cholecalciferol (VITAMIN D3) 125 MCG (5000 UT) TABS Take 5,000 Units by mouth daily   Yes Historical Provider, MD   Cyanocobalamin (VITAMIN B 12) 500 MCG TABS Take 1 tablet by mouth 2 times daily    Yes Historical Provider, MD       Review of Systems:  Constitutional:negative  for fevers, and negative for chills.   Eyes: negative for visual disturbance   ENT: negative for sore throat, negative nasal congestion, and negative for earache  Respiratory: negative for shortness of breath, negative for cough, and negative for wheezing  Cardiovascular: negative for chest pain, negative for palpitations, and negative for syncope  Gastrointestinal: negative for abdominal pain, negative for nausea,negative for vomiting, negative for diarrhea, negative for constipation, and negative for hematochezia or melena  Genitourinary: negative for dysuria, negative for urinary urgency, negative for urinary frequency, and negative for hematuria  Skin: negative for skin rash, and negative for skin lesions  Neurological: negative for unilateral weakness, numbness or tingling. Generalized weakness    Physical Exam:    Vitals:   Temp: 97.8 °F (36.6 °C)  BP: (!) 146/57  Resp: 18  Pulse: 71  SpO2: 98 %  24HR INTAKE/OUTPUT:      Intake/Output Summary (Last 24 hours) at 3/26/2021 1224  Last data filed at 3/26/2021 0650  Gross per 24 hour   Intake 50 ml   Output    Net 50 ml       Exam:  GEN:  in no acute distress, alert and frail-appearing  EYES: No gross abnormalities. , PERRL and EOMI  NECK: normal, supple, no lymphadenopathy,  no carotid bruits  PULM: clear to auscultation bilaterally- no wheezes, rales or rhonchi, normal air movement, no respiratory distress, diminished left base  COR: regular rate & rhythm, no murmurs, no gallops, S1 normal and S2 normal  ABD:  soft, non-tender, non-distended, normal bowel sounds, no masses or organomegaly  EXT:   no cyanosis, clubbing or edema present    NEURO: positive findings: Slow to respond, dysphagia, RICHEY  SKIN:  no rashes or significant lesions  -----------------------------------------------------------------  Diagnostic Data:   Lab Results   Component Value Date    WBC 6.3 03/25/2021    HGB 8.3 (L) 03/25/2021     03/25/2021       Lab Results   Component Value Date    BUN 7 (L) 03/26/2021    CREATININE 0.57 03/26/2021     (L) 03/26/2021    K 3.0 (L) 03/26/2021    CALCIUM 7.8 (L) 03/26/2021    CL 91 (L) 03/26/2021    CO2 25 03/26/2021    LABGLOM >60 03/26/2021       Lab Results   Component Value Date    WBCUA 0 TO 2 03/25/2021    RBCUA 0 TO 2 03/25/2021    EPITHUA 0 TO 2 03/25/2021    LEUKOCYTESUR NEGATIVE 03/25/2021    SPECGRAV <1.005 (L) 03/25/2021    GLUCOSEU NEGATIVE 03/25/2021    KETUA NEGATIVE 03/25/2021    PROTEINU NEGATIVE 03/25/2021    HGBUR 1+ (A) 03/25/2021    CASTUA NOT REPORTED 03/25/2021    CRYSTUA NOT REPORTED 03/25/2021    BACTERIA TRACE (A) 03/25/2021    YEAST NOT REPORTED 03/25/2021       Lab Results   Component Value Date    TROPONINT NOT REPORTED 03/26/2021 size.  There is elevation left hemidiaphragm. Small left pleural effusion with left basilar opacity. Faint right infrahilar opacity. No pneumothorax. Right pulmonary nodule measures 1.6 cm (previously 1.4 cm). Bibasilar opacity and left pleural effusion, concerning for multifocal pneumonia. CT head without contrast   Final Result   [   Old lacunar infarction in the right basal ganglia      Mild central and cortical cerebral atrophy. Mild chronic deep white matter ischemic changes      No acute intracranial abnormalities are noted. XR CHEST PORTABLE   Final Result   Bibasilar opacity and left pleural effusion, concerning for multifocal   pneumonia. Assessment:    Principal Problem:    Pneumonia  Active Problems:    Essential hypertension    Anemia of chronic renal failure, stage 3 (moderate)    Hypokalemia    Severe malnutrition (HCC)    Hyponatremia  Resolved Problems:    * No resolved hospital problems.  *      Patient Active Problem List    Diagnosis Date Noted    Pneumonia 03/26/2021    Hypokalemia 03/06/2021    H/O: CVA (cerebrovascular accident) 03/06/2021    Severe malnutrition ([address] 03/06/2021    Renal cell carcinoma ([address] 03/05/2021    Acute cystitis without hematuria 03/05/2021    Dehydration 03/05/2021    Anemia due to chemotherapy 03/05/2021    Hyponatremia 02/05/2021    Renal cell carcinoma of left kidney ([address] 02/04/2021    Anemia of chronic renal failure, stage 3 (moderate) 11/25/2020    Microcytic anemia 10/31/2020    Elevated ferritin 10/31/2020    Cerebral vascular disease 07/18/2018    Neuropathy 07/18/2018    Acute ischemic right middle cerebral artery (MCA) stroke (HCC)     Cerebrovascular accident (CVA) due to thrombosis of precerebral artery ([address] 02/06/2018    Hypertensive urgency 02/06/2018    LUE weakness 02/06/2018    Essential hypertension 02/06/2018    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 02/06/2018       Plan:     · This patient requires inpatient admission because of pneumonia  · Factors affecting the medical complexity of this patient include weakness, dehydration, anemia, severe malnutrition  · Estimated length of stay is 3 days  · Pneumoniaconcern for aspiration  · IV Rocephin/Zithromax  · Appreciate speech therapy for swallow eval  · Suction as needed  · Dehydration  · Normal saline at 75 mL's per hour  · Trend labs  · Anemia  · Continue vitamin B12  · Daily CBC  · Severe malnutrition  · Appreciate dietary consult  · Appreciate speech therapy  · Weakness  · PT/OT  · Hypokalemia  · We will add 40 mEq of potassium to IV fluids  · Hyponatremic   · 1500 mL fluid restriction  · Start normal saline at 75 mL's per hour  · High risk medication monitoring: None    I did have a conversation with the  and had discussion regarding her current condition, weight loss, decline in ability to eat and have sufficient nutrition, disease process and end-of-life wishes. At this time he agrees with a DNR CC arrest he states he is not ready to give up on her yet and is not ready for hospice. I do feel overall this patient would be a great hospice candidate as I feel that she is trending towards end-of-life. We did discuss quality of life. CORE MEASURES  DVT prophylaxis: Lovenox  Decubitus ulcer present on admission: No  CODE STATUS: DNR-CCA  Nutrition Status: poor  Physical therapy: Yes   Old Charts reviewed: Yes  EKG Reviewed:  Yes  Advance Directive Addressed: Yes    STEVE Nguyen, NP-C  3/26/2021, 12:24 PM

## 2021-03-26 NOTE — ED PROVIDER NOTES
[facility] AT THE [facility] MED SURG  Emergency Department Encounter  EmergencyMedicine Attending     Pt Javier Bravo  MRN: [id number]  Birthdate 1940  Date of evaluation: 3/25/21  PCP:  STEVE Desai CNP    CHIEF COMPLAINT       Chief Complaint   Patient presents with    Altered Mental Status     family stating pt is not acting right       HISTORY OF PRESENT ILLNESS  (Location/Symptom, Timing/Onset, Context/Setting, Quality, Duration, Modifying Factors, Severity.)      Donovan Mccabe is a 79 y.o. female who presents with generalized fatigue and weakness. The symptoms have been ongoing since yesterday. Last known well was yesterday. Patient is diffusely weak, does have baseline weakness of the left lower extremity from a previous stroke however at this time family believes that the weakness is generalized and diffuse with also some worsening of her left. Weakness has been slowly progressive and got much worse since yesterday. Patient did recently get diagnosed with a urinary infection as well, is on Zyvox for the infection. Some cough, no congestion runny nose, no chest pain, no fevers or chills, no abdominal pain. Denies any pain at all. Patient is slow in her responses but otherwise alert. The significant other says that usually patient is able to ambulate with assistance however today he had to pick her up which she struggled with as she was not able to ambulate much at all. PAST MEDICAL / SURGICAL / SOCIAL / FAMILY HISTORY      has a past medical history of B12 deficiency, CVA (cerebral vascular accident) ([address] CVD (cerebrovascular disease), and Hypertension. has no past surgical history on file.     Social History     Socioeconomic History    Marital status:      Spouse name: Not on file    Number of children: Not on file    Years of education: Not on file    Highest education level: Not on file   Occupational History    Not on file   Social Needs    Financial resource strain: Not on file    Food insecurity     Worry: Not on file     Inability: Not on file    Transportation needs     Medical: Not on file     Non-medical: Not on file   Tobacco Use    Smoking status: Never Smoker    Smokeless tobacco: Never Used   Substance and Sexual Activity    Alcohol use: No    Drug use: No    Sexual activity: Not on file   Lifestyle    Physical activity     Days per week: Not on file     Minutes per session: Not on file    Stress: Not on file   Relationships    Social connections     Talks on phone: Not on file     Gets together: Not on file     Attends Scientologist service: Not on file     Active member of club or organization: Not on file     Attends meetings of clubs or organizations: Not on file     Relationship status: Not on file    Intimate partner violence     Fear of current or ex partner: Not on file     Emotionally abused: Not on file     Physically abused: Not on file     Forced sexual activity: Not on file   Other Topics Concern    Not on file   Social History Narrative    Not on file       Family History   Problem Relation Age of Onset    Coronary Art Dis Maternal Cousin     Cancer Father         prostate    Cancer Paternal Grandfather         stomach       Allergies:  Mirtazapine and Zoloft [sertraline]    Home Medications:  Prior to Admission medications    Medication Sig Start Date End Date Taking?  Authorizing Provider   linezolid (ZYVOX) 600 MG tablet Take 1 tablet by mouth 2 times daily for 14 days 3/21/21 4/4/21 Yes Mary Crawford MD   hydrOXYzine (ATARAX) 25 MG tablet Take 25 mg by mouth nightly   Yes Historical Provider, MD   pantoprazole (PROTONIX) 40 MG tablet TAKE 1 TABLET BY MOUTH EVERY DAY BEFORE BREAKFAST 2/18/21  Yes STEVE Aponte - CNP   lisinopril (PRINIVIL;ZESTRIL) 10 MG tablet Take 1 tablet by mouth 2 times daily 2/5/21  Yes STEVE Aponte CNP   Cholecalciferol (VITAMIN D3) 125 MCG (5000 UT) TABS Take 5,000 Units Cranial Nerves: No cranial nerve deficit. Motor: Weakness present. Comments: Mostly generalized weakness however left upper and left lower extremity has significant asymmetry as far as weakness goes on exam, family says that she had a previous stroke and has previous history of weakness on that side. Otherwise no obvious cranial nerve deficits, no new focal neurological deficits. Psychiatric:         Mood and Affect: Mood normal.         DIFFERENTIAL  DIAGNOSIS     PLAN (LABS / IMAGING / EKG):  Orders Placed This Encounter   Procedures    Urine Culture    COVID-19, Rapid    Culture, Blood 1    Culture, Blood 1    XR CHEST PORTABLE    CT head without contrast    CBC Auto Differential    Lipase    Troponin    Brain Natriuretic Peptide    Protime-INR    APTT    Urinalysis, reflex to microscopic    Lactic Acid    Comprehensive Metabolic Panel    Urine Drug Screen    Microscopic Urinalysis    Troponin    CBC    Comprehensive Metabolic Panel w/ Reflex to MG    DIET GENERAL; No Added Salt (3-4 GM)    Vital signs per unit routine    Notify physician    Up as tolerated    Turn or assist with turn approximately every 2 hours if patient is unable to turn self. Remind patient to turn if necessary.     Assess skin per unit guidelines    Maintain HOB at the lowest elevation consistent with medical plan of care    Use lift equipment for lifting patient    Maintain heels off of bed at all times    Full Code    Inpatient consult to Case Management    Initiate Oxygen Therapy Protocol    Pulse Oximetry Spot Check    EKG 12 Lead    EKG 12 Lead    PATIENT STATUS (FROM ED OR OR/PROCEDURAL) Inpatient       MEDICATIONS ORDERED:  Orders Placed This Encounter   Medications    0.9 % sodium chloride bolus    cefTRIAXone (ROCEPHIN) 1,000 mg in sterile water 10 mL IV syringe     Order Specific Question:   Antimicrobial Indications     Answer:   Pneumonia (CAP)    azithromycin (Vinie Forte) 500 mg in D5W 250ml addavial     Order Specific Question:   Antimicrobial Indications     Answer:   Pneumonia (CAP)    linezolid (ZYVOX) IVPB 600 mg     Order Specific Question:   Antimicrobial Indications     Answer:   Urinary Tract Infection    Vitamin D (CHOLECALCIFEROL) tablet 5,000 Units    vitamin B-12 (CYANOCOBALAMIN) tablet 500 mcg    hydrOXYzine (ATARAX) tablet 25 mg    lisinopril (PRINIVIL;ZESTRIL) tablet 10 mg    pantoprazole (PROTONIX) tablet 40 mg    azithromycin (ZITHROMAX) 500 mg in D5W 250ml addavial     Order Specific Question:   Antimicrobial Indications     Answer:   Pneumonia (CAP)    cefTRIAXone (ROCEPHIN) 1,000 mg in sterile water 10 mL IV syringe     Order Specific Question:   Antimicrobial Indications     Answer:   Pneumonia (CAP)    sodium chloride flush 0.9 % injection 10 mL    sodium chloride flush 0.9 % injection 10 mL    enoxaparin (LOVENOX) injection 40 mg    OR Linked Order Group     promethazine (PHENERGAN) tablet 12.5 mg     ondansetron (ZOFRAN) injection 4 mg    polyethylene glycol (GLYCOLAX) packet 17 g    OR Linked Order Group     acetaminophen (TYLENOL) tablet 650 mg     acetaminophen (TYLENOL) suppository 650 mg       DDX: Pneumonia versus UTI versus sepsis versus atypical ACS versus TIA vs stroke    DIAGNOSTIC RESULTS / EMERGENCY DEPARTMENT COURSE / MDM   :  Results for orders placed or performed during the hospital encounter of 03/25/21   COVID-19, Rapid    Specimen: Nasopharyngeal Swab   Result Value Ref Range    Specimen Description . NASOPHARYNGEAL SWAB     SARS-CoV-2, Rapid Not Detected Not Detected   CBC Auto Differential   Result Value Ref Range    WBC 6.3 3.5 - 11.3 k/uL    RBC 3.49 (L) 3.95 - 5.11 m/uL    Hemoglobin 8.3 (L) 11.9 - 15.1 g/dL    Hematocrit 27.3 (L) 36.3 - 47.1 %    MCV 78.2 (L) 82.6 - 102.9 fL    MCH 23.8 (L) 25.2 - 33.5 pg    MCHC 30.4 28.4 - 34.8 g/dL    RDW 18.5 (H) 11.8 - 14.4 %    Platelets 431 610 - 357 k/uL    MPV 10.5 8.1 - 13.5 fL    NRBC Automated 0.0 0.0 per 100 WBC    Differential Type NOT REPORTED     WBC Morphology NOT REPORTED     RBC Morphology NOT REPORTED     Platelet Estimate NOT REPORTED     Seg Neutrophils 80 (H) 36 - 65 %    Lymphocytes 12 (L) 24 - 43 %    Monocytes 8 3 - 12 %    Eosinophils % 0 (L) 1 - 4 %    Basophils 0 0 - 2 %    Immature Granulocytes 1 (H) 0 %    Segs Absolute 5.00 1.50 - 8.10 k/uL    Absolute Lymph # 0.74 (L) 1.10 - 3.70 k/uL    Absolute Mono # 0.48 0.10 - 1.20 k/uL    Absolute Eos # <0.03 0.00 - 0.44 k/uL    Basophils Absolute <0.03 0.00 - 0.20 k/uL    Absolute Immature Granulocyte 0.03 0.00 - 0.30 k/uL   Lipase   Result Value Ref Range    Lipase 16 13 - 60 U/L   Troponin   Result Value Ref Range    Troponin, High Sensitivity 28 (H) 0 - 14 ng/L    Troponin T NOT REPORTED <0.03 ng/mL    Troponin Interp NOT REPORTED    Troponin   Result Value Ref Range    Troponin, High Sensitivity 28 (H) 0 - 14 ng/L    Troponin T NOT REPORTED <0.03 ng/mL    Troponin Interp NOT REPORTED    Brain Natriuretic Peptide   Result Value Ref Range    Pro-BNP 5,597 (H) <300 pg/mL    BNP Interpretation Pro-BNP Reference Range:    Protime-INR   Result Value Ref Range    Protime 15.9 (H) 11.5 - 14.2 sec    INR 1.3    APTT   Result Value Ref Range    PTT 40.4 (H) 23.9 - 33.8 sec   Urinalysis, reflex to microscopic   Result Value Ref Range    Color, UA YELLOW YELLOW    Turbidity UA CLEAR CLEAR    Glucose, Ur NEGATIVE NEGATIVE    Bilirubin Urine NEGATIVE NEGATIVE    Ketones, Urine NEGATIVE NEGATIVE    Specific Gravity, UA <1.005 (L) 1.010 - 1.020    Urine Hgb 1+ (A) NEGATIVE    pH, UA 6.0 5.0 - 9.0    Protein, UA NEGATIVE NEGATIVE    Urobilinogen, Urine Normal Normal    Nitrite, Urine NEGATIVE NEGATIVE    Leukocyte Esterase, Urine NEGATIVE NEGATIVE    Urinalysis Comments NOT REPORTED    Lactic Acid   Result Value Ref Range    Lactic Acid 1.0 0.5 - 2.2 mmol/L   Comprehensive Metabolic Panel   Result Value Ref Range    Glucose 152 (H) 70 - 99 mg/dL    BUN 9 8 - 23 mg/dL    CREATININE 0.63 0.50 - 0.90 mg/dL    Bun/Cre Ratio 14 9 - 20    Calcium 7.9 (L) 8.6 - 10.4 mg/dL    Sodium 124 (L) 135 - 144 mmol/L    Potassium 3.3 (L) 3.7 - 5.3 mmol/L    Chloride 89 (L) 98 - 107 mmol/L    CO2 27 20 - 31 mmol/L    Anion Gap 8 (L) 9 - 17 mmol/L    Alkaline Phosphatase 129 (H) 35 - 104 U/L    ALT 7 5 - 33 U/L    AST 17 <32 U/L    Total Bilirubin 0.80 0.3 - 1.2 mg/dL    Total Protein 6.5 6.4 - 8.3 g/dL    Albumin 2.0 (L) 3.5 - 5.2 g/dL    Albumin/Globulin Ratio 0.4 (L) 1.0 - 2.5    GFR Non-African American >60 >60 mL/min    GFR African American >60 >60 mL/min    GFR Comment          GFR Staging         Urine Drug Screen   Result Value Ref Range    Amphetamine Screen, Ur NEGATIVE NEGATIVE    Barbiturate Screen, Ur NEGATIVE NEGATIVE    Benzodiazepine Screen, Urine NEGATIVE NEGATIVE    Cocaine Metabolite, Urine NEGATIVE NEGATIVE    Methadone Screen, Urine NEGATIVE NEGATIVE    Opiates, Urine NEGATIVE NEGATIVE    Phencyclidine, Urine NEGATIVE NEGATIVE    Propoxyphene, Urine NEGATIVE NEGATIVE    Cannabinoid Scrn, Ur NEGATIVE NEGATIVE    Oxycodone Screen, Ur NEGATIVE NEGATIVE    Methamphetamine, Urine NEGATIVE NEGATIVE    Tricyclic Antidepressants, Urine NEGATIVE NEGATIVE    MDMA, Urine NOT REPORTED NEGATIVE    Buprenorphine Urine NEGATIVE NEGATIVE    Test Information NOT REPORTED    Microscopic Urinalysis   Result Value Ref Range    -          WBC, UA 0 TO 2 0 - 5 /HPF    RBC, UA 0 TO 2 0 - 2 /HPF    Casts UA NOT REPORTED /LPF    Crystals, UA NOT REPORTED None /HPF    Epithelial Cells UA 0 TO 2 0 - 25 /HPF    Renal Epithelial, UA NOT REPORTED 0 /HPF    Bacteria, UA TRACE (A) None    Mucus, UA TRACE (A) None    Trichomonas, UA NOT REPORTED None    Amorphous, UA NOT REPORTED None    Other Observations UA NOT REPORTED NOT REQ.     Yeast, UA NOT REPORTED None   Troponin   Result Value Ref Range    Troponin, High Sensitivity 28 (H) 0 - 14 ng/L    Troponin T NOT REPORTED <0.03 ng/mL    Troponin Interp NOT REPORTED    EKG 12 Lead   Result Value Ref Range    Ventricular Rate 82 BPM    Atrial Rate 82 BPM    P-R Interval 148 ms    QRS Duration 96 ms    Q-T Interval 404 ms    QTc Calculation (Bazett) 472 ms    P Axis 48 degrees    R Axis 5 degrees    T Axis 28 degrees   EKG 12 Lead   Result Value Ref Range    Ventricular Rate 82 BPM    Atrial Rate 82 BPM    P-R Interval 148 ms    QRS Duration 92 ms    Q-T Interval 440 ms    QTc Calculation (Bazett) 514 ms    P Axis 49 degrees    R Axis 7 degrees    T Axis 22 degrees       IMPRESSION: 61-year-old female who presents to the emergency department secondary to diffuse fatigue, generalized weakness, worse weakness on the left, last known well yesterday, outside of the TPA window, however previous history of stroke as well with previous weakness on the left that may potentially not have been much different than before. CT head did not show any acute or subacute findings of strokes. Mostly just chronic findings. Chest x-ray confirmed pneumonia, urine also with infection which is requiring Zyvox. Given the urinary infection, Zyvox was started, and given the pneumonia that is multifocal, started on Rocephin and azithromycin. 2 troponins are stable. Some hyponatremia as well. Plan to admit for inability to ambulate, hyperlipidemia, pneumonia and urinary infection. RADIOLOGY:    Xr Chest (2 Vw)    Result Date: 3/3/2021  EXAMINATION: TWO XRAY VIEWS OF THE CHEST 3/3/2021 2:34 pm COMPARISON: CT chest, abdomen and pelvis December 24, 2020. Frontal view of the chest February 6, 2018. HISTORY: ORDERING SYSTEM PROVIDED HISTORY: Dysphagia, unspecified type TECHNOLOGIST PROVIDED HISTORY: Cough Renal neoplasia. FINDINGS: There is a mass in the right mid lung field measuring approximately 14 mm diameter. Several hilar and perihilar old granulomatous calcifications. The heart is within upper limits of normal for size.   No evidence of pneumothorax or Portable    Result Date: 3/25/2021  EXAMINATION: ONE XRAY VIEW OF THE CHEST 3/25/2021 9:42 pm COMPARISON: 03/03/2021 HISTORY: ORDERING SYSTEM PROVIDED HISTORY: R/O PNA TECHNOLOGIST PROVIDED HISTORY: R/O PNA FINDINGS: Cardiomediastinal silhouette is unchanged in size. There is elevation left hemidiaphragm. Small left pleural effusion with left basilar opacity. Faint right infrahilar opacity. No pneumothorax. Right pulmonary nodule measures 1.6 cm (previously 1.4 cm). Bibasilar opacity and left pleural effusion, concerning for multifocal pneumonia. Fluoroscopy Modified Barium Swallow With Video    Result Date: 3/5/2021  EXAMINATION: MODIFIED BARIUM SWALLOW WAS PERFORMED IN CONJUNCTION WITH SPEECH PATHOLOGY SERVICES TECHNIQUE: Fluoroscopic evaluation of the swallowing mechanism was performed with multiple consistency of barium product. FLUOROSCOPY DOSE AND TYPE OR TIME AND EXPOSURES: 1.6 minutes. 2.23 mGy. COMPARISON: None HISTORY: ORDERING SYSTEM PROVIDED HISTORY: Difficulty swallowing and losing weight. Patient is a cancer patient TECHNOLOGIST PROVIDED HISTORY: Difficulty swallowing and losing weight. Patient is a cancer patient FINDINGS: Minimal transient penetration with thin consistency. No aspiration or deep penetration. No significant residuals. No aspiration or significant residuals. Please see separate speech pathology report for full discussion of findings and recommendations. EKG    EKG Interpretation    Interpreted by me    Rhythm: normal sinus   Rate: normal  Axis: normal  Ectopy: none  Conduction: qtc 514  ST Segments: no acute change  T Waves: no acute change  Q Waves: none    Clinical Impression: Non specific    All EKG's are interpreted by the Emergency Department Physician who either signs or Co-signs this chart in the absence of a cardiologist.    EMERGENCY DEPARTMENT COURSE:    Discussed with Dr. Junior Ennis, plan to admit.     PROCEDURES:  None    CONSULTS:  IP CONSULT TO CASE MANAGEMENT    CRITICAL CARE:  None    FINAL IMPRESSION      1. Pneumonia due to organism    2. Acute cystitis without hematuria    3. Generalized weakness    4. History of stroke    5. Hyponatremia          DISPOSITION / PLAN     DISPOSITION Admitted 03/26/2021 12:42:26 AM      PATIENT REFERRED TO:  No follow-up provider specified.     DISCHARGE MEDICATIONS:  Current Discharge Medication List          Kesha Thacker MD  Emergency Medicine Attending    (Please note that portions of thisnote were completed with a voice recognition program.  Efforts were made to edit the dictations but occasionally words are mis-transcribed.)        Kesha Thacker MD  03/26/21 7201

## 2021-03-26 NOTE — PROGRESS NOTES
Writer checked again on patient at this time. SpO2 remains 95-97% on room air, patient continuing to cough occasionally but verbalizes feeling better. Will continue to monitor patient.

## 2021-03-26 NOTE — PROGRESS NOTES
Writer to patients bedside at this time due to alarm sounding. SpO2 88% on room air. Patient encouraged to take deep breaths, SpO2 remains 88-89% on room air. Patient placed on 2 L via nasal cannula, SpO2 improved to 90-91% on 2 L. Will alert Moustapha Greer CNP.

## 2021-03-26 NOTE — PROGRESS NOTES
Writer at patients bedside at this time to administer morning medications. While writer is at bedside, patient having difficulty swallowing and began choking on orange juice. Patient sat straight up in bed and encouraged to cough. Suction hooked up but patient is fighting suction. Patient continuing to cough productively. SpO2 checked, patient is 95% on room air. Patient verbalizes feeling better after coughing. Patient left sitting up in bed, pulse ox left in place. Pansy Sas CNP made aware, swallow study ordered. Morning medications to be held at this time due to dysphagia. Will continue to monitor patient.

## 2021-03-26 NOTE — PROGRESS NOTES
Occupational Therapy   Occupational Therapy Initial Assessment  Date: 3/26/2021   Patient Name: Lily Rosen  MRN: [id number]     : 1940    Date of Service: 3/26/2021    Discharge Recommendations:  Continue to assess pending progress, Subacute/Skilled Nursing Facility       Assessment   Performance deficits / Impairments: Decreased functional mobility ; Decreased ADL status; Decreased endurance;Decreased ROM; Decreased coordination;Decreased strength;Decreased balance;Decreased posture  Assessment: Patient is a 79year old female admitted for pnemonia. Patient lives with her , per his report patient has significantly declined within the past month. Patient's  provides maximum assist for all ADLs at this time. Patient demonstrated significant weakness overall, requiring max 2A for sit to stand transfers and bed mobility. Recommend OT services to address these deficits to return patient to [facility]. Treatment Diagnosis: generalized weakness  Prognosis: Fair  Decision Making: Medium Complexity  OT Education: OT Role;Plan of Care  REQUIRES OT FOLLOW UP: Yes  Activity Tolerance  Activity Tolerance: Patient limited by fatigue  Activity Tolerance: Patient demonstrated significant weakness, unable to push through BUE to assist with sit to stand t/f. Safety Devices  Safety Devices in place: Yes  Type of devices: All fall risk precautions in place; Left in bed;Call light within reach           Patient Diagnosis(es): The primary encounter diagnosis was Pneumonia due to organism. Diagnoses of Acute cystitis without hematuria, Generalized weakness, History of stroke, and Hyponatremia were also pertinent to this visit. has a past medical history of B12 deficiency, CVA (cerebral vascular accident) ([facility] [address] CVD (cerebrovascular disease), and Hypertension. has no past surgical history on file. Treatment Diagnosis: generalized weakness      Restrictions       Subjective   General  Chart Reviewed:  Yes Family / Caregiver Present: Yes  Referring Practitioner: Aditya Arcos MD  Diagnosis: Pnemonia  Patient Currently in Pain: Denies  Pain Assessment  Pain Assessment: 0-10  Pain Level: 0  Vital Signs  Temp: 98.6 °F (37 °C)  Temp Source: Temporal  Pulse: 77  Heart Rate Source: Apical;Monitor  Resp: 18  BP: (!) 123/47  BP Location: Right upper arm  MAP (mmHg): 70  Patient Position: Semi fowlers  Level of Consciousness: Alert (0)  MEWS Score: 1  Patient Currently in Pain: Denies  Oxygen Therapy  SpO2: 99 %  Pulse Oximeter Device Mode: Continuous  Pulse Oximeter Device Location: Finger  O2 Device: Nasal cannula  O2 Flow Rate (L/min): 2 L/min  Social/Functional History  Social/Functional History  Lives With: Spouse  Type of Home: House  Home Layout: Able to Live on Main level with bedroom/bathroom  Home Access: Stairs to enter with rails  Entrance Stairs - Number of Steps: 5  Entrance Stairs - Rails: Right  Bathroom Shower/Tub: Tub/Shower unit(sponge bathes)  Bathroom Toilet: Handicap height  Bathroom Equipment: Grab bars in shower  Home Equipment: (unable a walk in the last two days)       Objective                 ADL  Feeding: Setup  Grooming: Dependent/Total  UE Bathing: Dependent/Total  LE Bathing: Dependent/Total  UE Dressing: Dependent/Total  LE Dressing: Dependent/Total  Toileting: Dependent/Total        Bed mobility  Supine to Sit: 2 Person assistance  Scooting: Minimal assistance  Transfers  Stand Step Transfers: 2 Person assistance  Sit to stand: Maximum assistance;2 Person assistance         Plan   Plan  Times per week: 7  Times per day: Daily  Current Treatment Recommendations: Safety Education & Training, Patient/Caregiver Education & Training, Self-Care / ADL, Balance Training, Functional Mobility Training, Endurance Training, Strengthening, ROM    AM-PAC Score        AM-PAC Inpatient Daily Activity Raw Score: 8 (03/26/21 1501)  AM-PAC Inpatient ADL T-Scale Score : 22.86 (03/26/21 1501)  ADL Inpatient CMS 0-100% Score: 85.69 (03/26/21 1501)  ADL Inpatient CMS G-Code Modifier : CM (03/26/21 1501)    Goals  Short term goals  Time Frame for Short term goals: 20 visits  Short term goal 1: Patient will complete transfers with mod A to increase independence with functional mobility. Short term goal 2: Patient will complete bed mobility with min A to return to PLOF. Short term goal 3: Patient will tolerate 5 minute self-care activity sitting EOB with min A to increase independence with self-care.        Therapy Time   Individual Concurrent Group Co-treatment   Time In [address]         Time Out 1445         Minutes [address]

## 2021-03-26 NOTE — PROGRESS NOTES
Caitlin FELDMAN and [place], palliative care, at patients bedside at this time. SpO2 remains 89-90% on 3 L via nasal cannula. Respiratory to see patient.

## 2021-03-26 NOTE — PROGRESS NOTES
Patient arrived to floor per cart from ED. Patient complains of weakness so is unable to ambulate to [facility] bed. Patient was moved over x2 assist to bed. Patient is A&O at this time. [employer] completed with patient's assistance. Assessment completed as charted. Bed alarm engaged for patient's safety. Patient was oriented to room and call lilt. Patient denies any needs at this time.

## 2021-03-26 NOTE — CONSULTS
normal or reduced; LOC full/confusion  ___40%  Mainly in bed; Extensive disease; Mainly assist; intake normal or reduced; LOC full/confusion   _x__30%  Bed Bound; Extensive disease; Total care; intake reduced; LOC full/confusion  ___20%  Bed Bound; Extensive disease; Total care; intake minimal; Drowsy/coma  ___10%  Bed Bound; Extensive disease; Total care; Mouth care only; Drowsy/coma  ___0       Death    Readmission Risk:  21%    Notes: Pavan Ramey is resting in bed for our visit. Her  Britney Qureshi is at the bedside. Pavan Ramey has limited response at this time. During conversation she will open her eyes but makes no effort to joining the conversation and does not answer questions when asked. Britney Qureshi tells me that they live at home alone. Their daughter that lives in [place] stayed with them all of January and February but had to return to [place] for her job. Britney Qureshi states that prior to two days ago Roselyn Adkins was able to walk with assistance and feed herself. The last 2 days he has had to provide all of her care. Britney Qureshi cooks, cleans, provides transportation, and manages her medications. States that his son and daughter in law, who live locally provide as much assistance as they are able. We discuss Rut diagnosis of renal cell carcinoma in her left kidney. States that she was diagnosed December 24 th of 2020. Since then they discussed possible surgery to remove the cancer at the [address]. Britney Qureshi states that during their appointment the surgeon said that she was too high risk to go through with the surgery. She was seen at [facility] here in [place] and immunotherapy was started \"because its not as strong as chemo. \" She has completed 2 infusions. She was there 3/22/2021 for another infusion but was unable to receive it as her \"electrolytes were all off. \" Support provided. Britney Quresih had a discussion with the [facility] prior to my arrival. They discussed code status and treatment options.  Britney Qureshi was agreeable to a DNR-CCA at this time. Layton Gordon states that [facility] has lost 50 pounds since the beginning of the year. \"She just cant eat solid food. Sometimes she gets nauseated and vomits of she just gags on it so we have been doing those protein shakes. \" States that he is able to assist her with drinking 1-2 shakes per day. She coughed while drinking fluids this morning and is currently on 5 L NC. She will have a speech therapy eval later today. We discuss their feelings on Hospice care options. Layton Sees states that his father had hospice. \"He stopped eating while he was in hospice too. \" Support provided. Layton Sees states that he feels hospice would be \"giving up\" and he is not prepared to do that. Support provided and discuss that its not giving up, just a change in goals from curative treatment to providing comfort. Discussed with  who will visit later today. We discuss advanced directives. Marie Lozano has a [address] on file from 2/8/2018 which remains active. Layton Gordon is her sole decision maker. Layton Gordon denies further needs. Marie Lozano is repositioned in bed at this time. Will continue to follow and support through out this admission.      Palliative Care Plan:  Education/support to family  Providing support for coping/adaptation/distress of family  Discussing meaning/purpose   Fear of death or dying  Caregiver support/education  Continue with current plan of care  Code status clarified: [facility]  Provided information about hospice  Palliative Care Goals:  provide comfort care/support/palliation/relieve suffering, remain at home, strengthening relationships, preserve independence/autonomy/control and support for family/caregiver  Visit focus:  Routine meeting  Functional decline  Discuss goals of care  Treatment options/plans  Listen to patient/family concerns  Assess family understanding, concerns, and coping  Discuss chronic critical illness  Discuss discharge planning  Interdiscplinary collaboration  Address patient/family distress  Build trust  Provide emotional support to the family  Elicit patient's goals and values (through substituted judgment of a surrogate decision maker), and use these to establish or modify goals of care     [address] Ilda Nurse Coordinator  3/26/2021 10:30 AM

## 2021-03-26 NOTE — PLAN OF CARE
Problem: Falls - Risk of:  Goal: Will remain free from falls  Description: Will remain free from falls  Outcome: Ongoing  Goal: Absence of physical injury  Description: Absence of physical injury  Outcome: Ongoing     Problem: Skin Integrity:  Goal: Will show no infection signs and symptoms  Description: Will show no infection signs and symptoms  Outcome: Ongoing  Goal: Absence of new skin breakdown  Description: Absence of new skin breakdown  Outcome: Ongoing     Problem: Infection:  Goal: Will remain free from infection  Description: Will remain free from infection  Outcome: Ongoing     Problem: Safety:  Goal: Free from accidental physical injury  Description: Free from accidental physical injury  Outcome: Ongoing  Goal: Free from intentional harm  Description: Free from intentional harm  Outcome: Ongoing     Problem: Daily Care:  Goal: Daily care needs are met  Description: Daily care needs are met  Outcome: Ongoing     Problem: Pain:  Goal: Patient's pain/discomfort is manageable  Description: Patient's pain/discomfort is manageable  Outcome: Ongoing     Problem: Skin Integrity:  Goal: Skin integrity will stabilize  Description: Skin integrity will stabilize  Outcome: Ongoing     Problem: Discharge Planning:  Goal: Patients continuum of care needs are met  Description: Patients continuum of care needs are met  Outcome: Ongoing     Problem: Discharge Planning:  Goal: Discharged to appropriate level of care  Description: Discharged to appropriate level of care  Outcome: Ongoing  Goal: Participates in care planning  Description: Participates in care planning  Outcome: Ongoing     Problem: Airway Clearance - Ineffective:  Goal: Clear lung sounds  Description: Clear lung sounds  Outcome: Ongoing  Goal: Ability to maintain a clear airway will improve  Description: Ability to maintain a clear airway will improve  Outcome: Ongoing     Problem: Fluid Volume - Deficit:  Goal: Achieves intake and output within specified parameters  Description: Achieves intake and output within specified parameters  Outcome: Ongoing     Problem: Gas Exchange - Impaired:  Goal: Levels of oxygenation will improve  Description: Levels of oxygenation will improve  Outcome: Ongoing     Problem: Hyperthermia:  Goal: Ability to maintain a body temperature in the normal range will improve  Description: Ability to maintain a body temperature in the normal range will improve  Outcome: Ongoing     Problem: Tobacco Use:  Goal: Will participate in inpatient tobacco-use cessation counseling  Description: Will participate in inpatient tobacco-use cessation counseling  Outcome: Ongoing

## 2021-03-26 NOTE — PROGRESS NOTES
Comprehensive Nutrition Assessment    Type and Reason for Visit:  Initial, Positive Nutrition Screen    Nutrition Recommendations/Plan:  Encourage oral intakes    Nutrition Assessment:  Chronic moderate malnutrition r/t inadequate nutrient intakes, AEB mild fat and muscle losses (limited areas viewable) and low PO intakes chronically. Very limited verbal responses from patient (one word answers). Mostly using \"drinks\" at home. .. Delmon Green \"vanilla\". Is on a f/r, so will utilize highest concentration supplements for keiry/protein (med pass and magic cup). Noted low vit D, on tx. At risk for 3rd spacing r/t low albumin. Low bun likely reflective of suboptimal exogenous protein delivery. Malnutrition Assessment:  Malnutrition Status: Moderate malnutrition    Context:  Chronic Illness     Findings of the 6 clinical characteristics of malnutrition:  Energy Intake:  7 - 75% or less estimated energy requirements for 1 month or longer  Weight Loss:  No significant weight loss     Body Fat Loss:  1 - Mild body fat loss Buccal region   Muscle Mass Loss:  1 - Mild muscle mass loss Temples (temporalis)  Fluid Accumulation:  No significant fluid accumulation     Strength:  Not Performed    Estimated Daily Nutrient Needs:  Energy (kcal):  3629-5824(02-89); Weight Used for Energy Requirements:  Current     Protein (g):  62-72(1.3-1.5); Weight Used for Protein Requirements:  Current        Fluid (ml/day):  1700; Method Used for Fluid Requirements:  1 ml/kcal      Nutrition Related Findings:  underweight and malnourished appearing      Wounds:  None       Current Nutrition Therapies:    DIET GENERAL; No Added Salt (3-4 GM);  Daily Fluid Restriction: 1500 ml  Dietary Nutrition Supplements: High Calorie Oral Supplement  Dietary Nutrition Supplements: Frozen Oral Supplement    Anthropometric Measures:  · Height: 5' 5\" (165.1 cm)  · Current Body Weight: 105 lb 13.1 oz (48 kg)   · Admission Body Weight: 105 lb 13.1 oz (48 kg)    · Usual Body Weight: 112 lb (50.8 kg)(in December. ..weight  since)     · Ideal Body Weight: 125 lbs; % Ideal Body Weight 84.7 %   · BMI: 17.6  · Adjusted Body Weight:  ; No Adjustment   · BMI Categories: Underweight (BMI less than 18.5)       Nutrition Diagnosis:   · Moderate malnutrition, In context of chronic illness related to inadequate protein-energy intake as evidenced by mild muscle loss, mild loss of subcutaneous fat, poor intake prior to admission    Lab Results   Component Value Date     (L) 03/26/2021    K 3.0 (L) 03/26/2021    CL 91 (L) 03/26/2021    CO2 25 03/26/2021    BUN 7 (L) 03/26/2021    CREATININE 0.57 03/26/2021    GLUCOSE 131 (H) 03/26/2021    CALCIUM 7.8 (L) 03/26/2021    PROT 6.1 (L) 03/26/2021    LABALBU 1.7 (L) 03/26/2021    BILITOT 0.64 03/26/2021    ALKPHOS 106 (H) 03/26/2021    AST 13 03/26/2021    ALT 8 03/26/2021    LABGLOM >60 03/26/2021    GFRAA >60 03/26/2021     Lab Results   Component Value Date    LABA1C 6.2 (H) 10/16/2020     Lab Results   Component Value Date     10/16/2020     Lab Results   Component Value Date    VITD25 22.1 (L) 10/16/2020     Nutrition Interventions:   Food and/or Nutrient Delivery:  Continue Current Diet, Start Oral Nutrition Supplement  Nutrition Education/Counseling:  No recommendation at this time   Coordination of Nutrition Care:  Continue to monitor while inpatient    Goals:  PO >75% meals and supplements       Nutrition Monitoring and Evaluation:   Behavioral-Environmental Outcomes:  Beliefs and Attitutes   Food/Nutrient Intake Outcomes:  Food and Nutrient Intake, Supplement Intake  Physical Signs/Symptoms Outcomes:  Biochemical Data, Weight     Discharge Planning:     Too soon to determine     Electronically signed by Jose Shaffer RD, RENNY on 3/26/21 at 8:02 AM EDT    Contact: [phone]

## 2021-03-26 NOTE — PROGRESS NOTES
Patient resting quietly in bed at this time, SpO2 98% via continuous pulse ox on 2.5 L O2. Will continue to monitor.

## 2021-03-26 NOTE — PROCEDURES
INSTRUMENTAL SWALLOW REPORT  MODIFIED BARIUM SWALLOW    NAME: Dayan Kearney   : 1940  MRN: [id number]       Date of Eval: 3/26/2021        Referring Diagnosis(es): Referring Diagnosis: Dysphagia    Past Medical History:  has a past medical history of B12 deficiency, CVA (cerebral vascular accident) ([address] CVD (cerebrovascular disease), and Hypertension. Past Surgical History:  has no past surgical history on file. Current Diet Solid Consistency: Regular  Current Diet Liquid Consistency: Thin       Type of Study: Repeat MBS       Recent CXR/CT of Chest: Date 3/25/21   Bibasilar opacity and left pleural effusion, concerning for multifocal   pneumonia.             Patient Complaints/Reason for Referral:  Dayan Kearney was referred for a MBS to assess the efficiency of his/her swallow function, assess for aspiration, and to make recommendations regarding safe dietary consistencies, effective compensatory strategies, and safe eating environment. Onset of problem:   Pt was seen on 3/5 for MBS resulting in regular and thin diet rec. Pt came to hospital on 3/25 with altered mental status and was found to have pneumonia. Pt had coughing with OJ this morning at [facility] [facility] and MBS was ordered. Pt continues to eat minimal po at home but will drink Santana shakes per . She continues to have some emesis and regurgitation per  report. Pt continue sto state food sounds bad and tastes bad. Behavior/Cognition/Vision/Hearing:  Behavior/Cognition: Alert; Cooperative  Vision: Within Functional Limits(WFl for MBS)  Hearing: Within functional limits(WFL for MBS)    Impressions:     Patient Position: Lateral     Consistencies Administered: Nectar straw; Thin straw;Pudding cup;Dysphagia Minced and Moist (Dysphagia II)    Compensatory Swallowing Strategies Attempted:  Alternate solids and liquids;Eat/Feed slowly;Upright as possible for all oral intake;Small bites/sips     Oral Phase: minimal change in oral phase since last MBS. mild oral phase Dysphagia characterized by slow mastication of soft solid trials and premature entry of partial bolus into valleculae before swallow trigger on all trials (thin, nectar, puree and soft). Pt demonstrated functional straw use, no oral stasis and no pocketing. There was noted functional A-P transit of thin, nectar, soft and puree bolus trials however at times it was slow but effective. With premature entry of foods, mixed consistencies are likely to give pt difficulty and should be avoided    Pharyngeal: pt presents with WFL pharyngeal phase of swallow. Pt was noted to have trace penetration with large sequential drinks of thin liquids intermittently but with smaller drinks pt tolerated without any trace penetration. Pt has mild generalized weakness but demonstrated no aspiration, no stasis and a functional pharyngeal phse of swallow. Dysphagia Outcome Severity Scale: Level 6: Within functional limits/Modified independence  Penetration-Aspiration Scale (PAS): 2 - Material enters the airway, remains above the vocal folds, and is ejected from the airway(same score as previous MBS)    Recommended Diet:  Solid consistency: Dental Soft  Liquid consistency: Thin  Liquid administration via: (per pt request)    Medication administration: Meds in puree    Safe Swallow Protocol:  Supervision: Distant  Compensatory Swallowing Strategies: Alternate solids and liquids;Upright as possible for all oral intake;Remain upright for 30-45 minutes after meals;Small bites/sips    Recommendations/Treatment  Requires SLP Intervention: Yes(1 time f/u)     D/C Recommendations:  To be determined       Recommended Exercises:    Therapeutic Interventions: Diet tolerance monitoring;Patient/Family education    Referral To: Dietician([facility] ontinue with dietician for calorie monitoring and suggestions due to  solid intake)    Education: Images and recommendations were reviewed with pt and  following this exam.   Patient Education: extensive education completed with pt, ede and nurse re: results of MBS and recs for pills in puree, small meals/bites of food throughout the day and working with dietary for ideas for calories. Discussed esophagram is emesis and regurgitation continue  Patient Education Response: Verbalizes understanding    Prognosis  Prognosis for safe diet advancement: fair  Barriers to reach goals: age;other (comment)(decreased desire for PO intake)  Duration/Frequency of Treatment  Duration/Frequency of Treatment: 1 time follow up      Goals:    Long Term:   Timeframe for Long-term Goals: 1 week  Goal 1: tolerate current diet of soft and thin without overt s/s aspiration  Short Term:     Goal 1: tolerate thin liquids x10 without s/s aspiration  Goal 2: tolerate soft solids without s/s aspiration  Goal 3: tolerate meds in puree without overt s/s aspiration (observed or per nurse report)       Dysphagia Goals: The patient will tolerate recommended diet without observed clinical signs of aspiration    Oral Preparation / Oral Phase  Oral Phase: Impaired  Oral Phase - Major Contributing Deficits  Poor Mastication: Reg solid; Soft solid(slow)  Premature Bolus Loss to Pharynx: Soft solid;Pudding cup; Thin straw;Nectar straw    Pharyngeal Phase  Pharyngeal Phase: Impaired  Pharyngeal Phase - Major Contributing Deficits  Premature Spillage to Valleculae: Soft solid; Thin straw;Nectar straw;Pudding cup  Shallow Penetration During: Thin straw(only large consecutive drinks as noted on last MBS)    Esophageal Phase  Esophageal Screen: WFL    Pain   Patient Currently in Pain: Denies  Pain Level: 0      Therapy Time:   Individual   Time In 0200   Time Out 0235   Minutes 35     Pt presented for MBS from hospital room post choking incident this  Morning on OJ resulting in altered oxygen levels. Pt reports she has been coughing more at home and has continued to have poor PO intake. Pt reports she does not think food sounds good or tastes good. She will continue to drink some shakes at home. Pt also reported regurgitation at home as well. MBS was not changed since 3/5/2021. mild oral phase Dysphagia characterized by slow mastication of soft solid trials and premature entry of partial bolus into valleculae before swallow trigger on all trials (thin, nectar, puree and soft). Pt demonstrated functional straw use, no oral stasis and no pocketing. There was noted functional A-P transit of thin, nectar, soft and puree bolus trials however at times it was slow but effective. With premature entry of foods, mixed consistencies are likely to give pt difficulty and should be avoided pt presents with [facility] pharyngeal phase of swallow. Pt was noted to have trace penetration with large sequential drinks of thin liquids intermittently but with smaller drinks pt tolerated without any trace penetration. Pt has mild generalized weakness but demonstrated no aspiration, no stasis and a functional pharyngeal phse of swallow. At this time the following rec were made to decrease change of aspiration:  1. 1 time ST follow up for diet monitoring  2. Upright with all meals  3. meds in puree and 1-2 small pills at a time  4. Encourage small meals/bites throughout the day  5. Eat only when awake and willing to engage  6.  Keep bites and drink small     SLP rec esophagram if regurgitation continues to be present     Dianne Peña M.S.,CCC-SLP   3/26/2021, 3:46 PM

## 2021-03-27 LAB
ANION GAP SERPL CALCULATED.3IONS-SCNC: 7 MMOL/L (ref 9–17)
BUN BLDV-MCNC: 6 MG/DL (ref 8–23)
BUN/CREAT BLD: 11 (ref 9–20)
CALCIUM SERPL-MCNC: 7.8 MG/DL (ref 8.6–10.4)
CHLORIDE BLD-SCNC: 94 MMOL/L (ref 98–107)
CO2: 24 MMOL/L (ref 20–31)
CREAT SERPL-MCNC: 0.53 MG/DL (ref 0.5–0.9)
GFR AFRICAN AMERICAN: >60 ML/MIN
GFR NON-AFRICAN AMERICAN: >60 ML/MIN
GFR SERPL CREATININE-BSD FRML MDRD: ABNORMAL ML/MIN/{1.73_M2}
GFR SERPL CREATININE-BSD FRML MDRD: ABNORMAL ML/MIN/{1.73_M2}
GLUCOSE BLD-MCNC: 107 MG/DL (ref 70–99)
HCT VFR BLD CALC: 21.2 % (ref 36.3–47.1)
HCT VFR BLD CALC: 25.1 % (ref 36.3–47.1)
HEMOGLOBIN: 6.6 G/DL (ref 11.9–15.1)
HEMOGLOBIN: 7.8 G/DL (ref 11.9–15.1)
MCH RBC QN AUTO: 24.4 PG (ref 25.2–33.5)
MCHC RBC AUTO-ENTMCNC: 31.1 G/DL (ref 28.4–34.8)
MCV RBC AUTO: 78.5 FL (ref 82.6–102.9)
NRBC AUTOMATED: 0 PER 100 WBC
PDW BLD-RTO: 18.6 % (ref 11.8–14.4)
PLATELET # BLD: ABNORMAL K/UL (ref 138–453)
PLATELET, FLUORESCENCE: 120 K/UL (ref 138–453)
PLATELET, IMMATURE FRACTION: 3.7 % (ref 1.1–10.3)
PMV BLD AUTO: ABNORMAL FL (ref 8.1–13.5)
POTASSIUM SERPL-SCNC: 3.8 MMOL/L (ref 3.7–5.3)
RBC # BLD: 2.7 M/UL (ref 3.95–5.11)
SODIUM BLD-SCNC: 125 MMOL/L (ref 135–144)
WBC # BLD: 5.4 K/UL (ref 3.5–11.3)

## 2021-03-27 PROCEDURE — 85018 HEMOGLOBIN: CPT

## 2021-03-27 PROCEDURE — 80048 BASIC METABOLIC PNL TOTAL CA: CPT

## 2021-03-27 PROCEDURE — 6360000002 HC RX W HCPCS: Performed by: INTERNAL MEDICINE

## 2021-03-27 PROCEDURE — 85027 COMPLETE CBC AUTOMATED: CPT

## 2021-03-27 PROCEDURE — 94640 AIRWAY INHALATION TREATMENT: CPT

## 2021-03-27 PROCEDURE — 86900 BLOOD TYPING SEROLOGIC ABO: CPT

## 2021-03-27 PROCEDURE — P9016 RBC LEUKOCYTES REDUCED: HCPCS

## 2021-03-27 PROCEDURE — 36430 TRANSFUSION BLD/BLD COMPNT: CPT

## 2021-03-27 PROCEDURE — 86901 BLOOD TYPING SEROLOGIC RH(D): CPT

## 2021-03-27 PROCEDURE — 6370000000 HC RX 637 (ALT 250 FOR IP): Performed by: INTERNAL MEDICINE

## 2021-03-27 PROCEDURE — 94761 N-INVAS EAR/PLS OXIMETRY MLT: CPT

## 2021-03-27 PROCEDURE — 85014 HEMATOCRIT: CPT

## 2021-03-27 PROCEDURE — 36415 COLL VENOUS BLD VENIPUNCTURE: CPT

## 2021-03-27 PROCEDURE — 92526 ORAL FUNCTION THERAPY: CPT

## 2021-03-27 PROCEDURE — 2700000000 HC OXYGEN THERAPY PER DAY

## 2021-03-27 PROCEDURE — 1200000000 HC SEMI PRIVATE

## 2021-03-27 PROCEDURE — 2580000003 HC RX 258: Performed by: INTERNAL MEDICINE

## 2021-03-27 PROCEDURE — 6360000002 HC RX W HCPCS: Performed by: NURSE PRACTITIONER

## 2021-03-27 PROCEDURE — 86850 RBC ANTIBODY SCREEN: CPT

## 2021-03-27 PROCEDURE — 85055 RETICULATED PLATELET ASSAY: CPT

## 2021-03-27 RX ORDER — SODIUM CHLORIDE 9 MG/ML
INJECTION, SOLUTION INTRAVENOUS PRN
Status: DISCONTINUED | OUTPATIENT
Start: 2021-03-27 | End: 2021-04-02 | Stop reason: HOSPADM

## 2021-03-27 RX ADMIN — HYDROXYZINE HYDROCHLORIDE 25 MG: 25 TABLET, FILM COATED ORAL at 20:37

## 2021-03-27 RX ADMIN — LINEZOLID 600 MG: 600 INJECTION, SOLUTION INTRAVENOUS at 12:37

## 2021-03-27 RX ADMIN — CYANOCOBALAMIN TAB 1000 MCG 500 MCG: 1000 TAB at 09:11

## 2021-03-27 RX ADMIN — LINEZOLID 600 MG: 600 INJECTION, SOLUTION INTRAVENOUS at 00:17

## 2021-03-27 RX ADMIN — LISINOPRIL 10 MG: 10 TABLET ORAL at 09:10

## 2021-03-27 RX ADMIN — PANTOPRAZOLE SODIUM 40 MG: 40 TABLET, DELAYED RELEASE ORAL at 09:15

## 2021-03-27 RX ADMIN — LISINOPRIL 10 MG: 10 TABLET ORAL at 20:37

## 2021-03-27 RX ADMIN — CYANOCOBALAMIN TAB 1000 MCG 500 MCG: 1000 TAB at 18:00

## 2021-03-27 RX ADMIN — ALBUTEROL SULFATE 2.5 MG: 2.5 SOLUTION RESPIRATORY (INHALATION) at 08:33

## 2021-03-27 RX ADMIN — ALBUTEROL SULFATE 2.5 MG: 2.5 SOLUTION RESPIRATORY (INHALATION) at 20:21

## 2021-03-27 RX ADMIN — POTASSIUM CHLORIDE AND SODIUM CHLORIDE: 900; 300 INJECTION, SOLUTION INTRAVENOUS at 19:00

## 2021-03-27 RX ADMIN — POTASSIUM CHLORIDE AND SODIUM CHLORIDE: 900; 300 INJECTION, SOLUTION INTRAVENOUS at 04:49

## 2021-03-27 RX ADMIN — ALBUTEROL SULFATE 2.5 MG: 2.5 SOLUTION RESPIRATORY (INHALATION) at 15:51

## 2021-03-27 RX ADMIN — WATER 1000 MG: 1 INJECTION INTRAMUSCULAR; INTRAVENOUS; SUBCUTANEOUS at 23:01

## 2021-03-27 RX ADMIN — AZITHROMYCIN MONOHYDRATE 500 MG: 500 INJECTION, POWDER, LYOPHILIZED, FOR SOLUTION INTRAVENOUS at 23:01

## 2021-03-27 RX ADMIN — Medication 5000 UNITS: at 09:11

## 2021-03-27 RX ADMIN — SODIUM CHLORIDE, PRESERVATIVE FREE 10 ML: 5 INJECTION INTRAVENOUS at 20:37

## 2021-03-27 ASSESSMENT — PAIN SCALES - GENERAL
PAINLEVEL_OUTOF10: 0
PAINLEVEL_OUTOF10: 0

## 2021-03-27 NOTE — PLAN OF CARE
this time.      Problem: Skin Integrity:  Goal: Skin integrity will stabilize  Description: Skin integrity will stabilize  Outcome: Ongoing  Note: Zinc paste applied to reddened area on coccyx     Problem: Airway Clearance - Ineffective:  Goal: Clear lung sounds  Description: Clear lung sounds  Outcome: Ongoing  Note: Lung sounds clear and diminished at this time   Goal: Ability to maintain a clear airway will improve  Description: Ability to maintain a clear airway will improve  Outcome: Ongoing  Note: Airway remains patent on 1L per nasal cannula     Problem: Fluid Volume - Deficit:  Goal: Achieves intake and output within specified parameters  Description: Achieves intake and output within specified parameters  Outcome: Ongoing  Note: Patient oral intake is very poor     Problem: Gas Exchange - Impaired:  Goal: Levels of oxygenation will improve  Description: Levels of oxygenation will improve  Outcome: Ongoing  Note: Patient 94% on 1L per nasal cannula     Problem: Hyperthermia:  Goal: Ability to maintain a body temperature in the normal range will improve  Description: Ability to maintain a body temperature in the normal range will improve  Outcome: Ongoing  Note: Patient afebrile at this time     Problem: Nutrition  Goal: Optimal nutrition therapy  Outcome: Ongoing  Note: Patient oral intake very poor

## 2021-03-27 NOTE — PROGRESS NOTES
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  ONSET DATE: 3/25/21        Date of Treatment: 3/27/2021  Evaluating Therapist: Yaz Sharma    Current Diet level:   Regular and thin      Primary Complaint       Pain:  Pain Assessment  Pain Assessment: 0-10  Pain Level: 0  Patient's Stated Pain Goal: No pain    Reason for Referral  Art Shepherd was referred for a bedside swallow evaluation to assess the efficiency of her swallow function, identify signs and symptoms of aspiration and make recommendations regarding safe dietary consistencies, effective compensatory strategies, and safe eating environment. Impression           Treatment Plan  Requires SLP Intervention: No  Duration/Frequency of Treatment: No follow up needed at this time, if any other concerns family or nursing can contact doctor to involve speech again for care/therapy. D/C Recommendations:  To be determined  Referral To: Dietician    Recommended Diet and Intervention        Recommended Form of Meds: Crushed in puree as able     Therapeutic Interventions: Diet tolerance monitoring;Patient/Family education    Compensatory Swallowing Strategies  Compensatory Swallowing Strategies: Alternate solids and liquids;Upright as possible for all oral intake;Remain upright for 30-45 minutes after meals;Small bites/sips    Treatment/Goals  Short-term Goals  Timeframe for Short-term Goals: 1 time follow up  Goal 1: tolerate thin liquids x10 without s/s aspiration  Goal 2: tolerate soft solids without s/s aspiration  Goal 3: tolerate meds in puree without overt s/s aspiration (observed or per nurse report)  Long-term Goals  Timeframe for Long-term Goals: 1 week  Goal 1: tolerate current diet of soft and thin without overt s/s aspiration  Dysphagia Goals: The patient will tolerate recommended diet without observed clinical signs of aspiration    General  Chart Reviewed: Yes  Comments: Pt is noted to have low iron levels and will be getting a transfusion soon. Subjective  Subjective: Pt was tired and in bed and  was in room also  Behavior/Cognition: Alert; Cooperative           Vision/Hearing  Vision  Vision: Within Functional Limits  Hearing  Hearing: Within functional limits    Oral Motor Deficits       Oral Phase Dysfunction        Indicators of Pharyngeal Phase Dysfunction        Prognosis  Prognosis  Prognosis for safe diet advancement: fair  Barriers to reach goals: age;other (comment)  Barriers/Prognosis Comment: Pt is weak and does not want to eat much  Individuals consulted  Consulted and agree with results and recommendations: Patient; Family member;RN  Family member consulted:     Education  Patient Education: extensive education completed with pt,  and nurse re: results of MBS and recs for pills in puree, small meals/bites of food throughout the day and working with dietary for ideas for calories.   Patient Education Response: Verbalizes understanding             Therapy Time  SLP Individual Minutes  Time In: 0945  Time Out: 1000  Minutes: 15      Pt was seen in room upright in bed,  was present. Pt appeared weak and tired but was able to communicate wants and needs and to answer questions. Pt reported being full due to eating ice cream and  said that pt had a couple of bites of oatmeal and a little juice to drink.  also reported had brought ensure but pt would not drink it. Pt  and I discussed a little more about swallow study that was completed. Pt did not want to try any food items for SLP. Pt did drink two sips of water from a straw and while there was a delay to swallow, there was no coughing or wet vocal quality noted. I explained that speech will not continue to see pt at this time and pt and  were ok with this recommendation. Pt should continue to be monitored with dietary for calories input.       Meri Suazo, CECILIA  3/27/2021 11:53 AM

## 2021-03-27 NOTE — PROGRESS NOTES
Patient medications given to her crushed and with vanilla ice cream per pt  request as Stacey Richards doesn't like the applesauce and the pudding has been sitting out. \" Informed pt  we have snack packs available and pt  stated \"she doesn't like those\". Patient took medications in ice cream with no problem, however she declined to finish the ice cream. Offered patient a sip from her vanilla milkshake, and patient refused. Patient asked writer to throw the extra milkshake as well as everything on her tray away.

## 2021-03-27 NOTE — PROGRESS NOTES
At bedside with patient, hemoglobin drawn, family at bedside. Assessment done, vitals obtained, whiteboard updated. Patient denies having pain and has no further needs at this time. Cleaned up patient room of trash and food tray prior to leaving room. Encouraged patient and family to press call light if any needs arise.

## 2021-03-27 NOTE — PROGRESS NOTES
Pelon Pacheco M.D. Internal Medicine Progress Note     SUBJECTIVE:    Patient seen for f/u of Pneumonia. She is very weak and fatigued. She denies pain. she has been afebrile. Denies n/v/d but just doesn't have much appetite. ROS:   Constitutional: negative  for fevers, and negative for chills. Respiratory: negative for shortness of breath, negative for cough, and negative for wheezing  Cardiovascular: negative for chest pain, and negative for palpitations  Gastrointestinal: negative for abdominal pain, negative for nausea,negative for vomiting, negative for diarrhea, and negative for constipation     All other systems were reviewed with the patient and are negative unless otherwise stated in HPI    OBJECTIVE:  Vitals:   Temp: 98.2 °F (36.8 °C)  BP: (!) 133/59  Resp: 16  Pulse: 78  SpO2: 98 % on supplemental O2    24HR INTAKE/OUTPUT:      Intake/Output Summary (Last 24 hours) at 3/27/2021 1141  Last data filed at 3/27/2021 1050  Gross per 24 hour   Intake 100 ml   Output 400 ml   Net -300 ml     -----------------------------------------------------------------  Exam:  GEN:    Awake, alert and oriented x3. EYES:  EOMI, pupils equal   NECK: Supple. No lymphadenopathy. No carotid bruit  CVS:    regular rate and rhythm, no audible murmur  PULM:  CTA, no wheezes, rales or rhonchi, no acute respiratory distress  ABD:    Bowels sounds normal.  Abdomen is soft. No distention. no tenderness to palpation. EXT:   no edema bilaterally . No calf tenderness. NEURO: Moves all extremities. Motor and sensory are grossly intact  SKIN:  No rashes.   No skin lesions.    -----------------------------------------------------------------  Diagnostic Data:    · All available data reviewed  Lab Results   Component Value Date    WBC 5.4 03/27/2021    HGB 6.6 (LL) 03/27/2021    MCV 78.5 (L) 03/27/2021    PLT See Reflexed IPF Result 03/27/2021      Lab Results   Component Value Date    GLUCOSE 107 (H) 03/27/2021    BUN

## 2021-03-27 NOTE — PROGRESS NOTES
[facility]  Inpatient/Observation/Outpatient Rehabilitation    Date: 3/27/2021  Patient Name: Mary Beth Brito       [x] Inpatient Acute/Observation       []  Outpatient  : 1940       [] Pt no showed for scheduled appointment    [] Pt refused/declined therapy at this time due to:           [x] Pt cancelled due to:  [] No Reason Given   [] Sick/ill   [] Other: Held therapy this date d/t critical low Hgb level of 6.6. Will attempt evaluation at our earliest opportunity.        Daniela Batista, PT, DPT Date: 3/27/2021

## 2021-03-27 NOTE — PROGRESS NOTES
Lab called with critical lab. .. hgb 6.6.  Nurse Genesis notified as well, she will call Dr. Gwendolyn Lincoln to notify

## 2021-03-27 NOTE — PROGRESS NOTES
Number of Steps: 5  Entrance Stairs - Rails: Right  Bathroom Shower/Tub: Tub/Shower unit(sponge bathes)  Bathroom Toilet: Handicap height  Bathroom Equipment: Grab bars in shower  Home Equipment: (unable a walk in the last two days)  ADL Assistance: Needs assistance  Homemaking Assistance: Needs assistance  Ambulation Assistance: Needs assistance  Transfer Assistance: Needs assistance  Additional Comments: Pt's  states they walk HHA at home     Cognition   Cognition  Overall Cognitive Status: WFL    Objective  AROM RLE (degrees)  RLE AROM: WFL  AROM LLE (degrees)  LLE AROM : WFL     Strength RLE  Comment: grossly 4-/5  Strength LLE  Comment: grossly 4-/5     Bed mobility  Supine to Sit: Moderate assistance;2 Person assistance  Scooting: Moderate assistance     Transfers  Sit to Stand: Moderate Assistance;2 Person Assistance  Stand to sit: Moderate Assistance;2 Person Assistance     Ambulation  Ambulation?: Yes  Ambulation 1  Device: No Device  Assistance: Moderate assistance;2 Person assistance  Distance: 2-3 steps WC to bed; 2-3 steps sides stepping to head of bed     Balance  Sitting - Static: Fair  Sitting - Dynamic: Fair  Standing - Static: Fair  Standing - Dynamic: Fair;Poor      Plan   Plan  Times per week: 7 x week  Times per day: Twice a day(daily on weekends)  Current Treatment Recommendations: Strengthening, Neuromuscular Re-education, Home Exercise Program, Safety Education & Training, Balance Training, Endurance Training, Patient/Caregiver Education & Training, Functional Mobility Training, Transfer Training, Gait Training, Stair training  Safety Devices  Type of devices:  All fall risk precautions in place      AM-PAC Score  AM-PAC Inpatient Mobility without Stair Climbing Raw Score : 10 (03/27/21 0742)  AM-PAC Inpatient without Stair Climbing T-Scale Score : 34.07 (03/27/21 0742)  Mobility Inpatient CMS 0-100% Score: 71.66 (03/27/21 5481)  Mobility Inpatient without Stair CMS G-Code Modifier : CL (03/27/21 2978)       Goals  Short term goals  Time Frame for Short term goals: 14 days  Short term goal 1: Pt to ambulate 30ft HHA with no LOB. Short term goal 2: Pt to tolerate 20-30mins ther ex/act for improved strength and endurance with ADL's. Short term goal 3: Pt to demonstrate good sitting balance. Short term goal 4: Pt to perform bed mob and transfers with min A +1.   Patient Goals   Patient goals : none stated       Therapy Time   Individual Concurrent Group Co-treatment   Time In 1425         Time Out 1450         Minutes 25                 Joie Birch, PT, DPT, CMPT

## 2021-03-27 NOTE — PLAN OF CARE
Problem: Falls - Risk of:  Goal: Will remain free from falls  Description: Will remain free from falls  Outcome: Ongoing  Goal: Absence of physical injury  Description: Absence of physical injury  Outcome: Ongoing     Problem: Skin Integrity:  Goal: Will show no infection signs and symptoms  Description: Will show no infection signs and symptoms  Outcome: Ongoing  Goal: Absence of new skin breakdown  Description: Absence of new skin breakdown  Outcome: Ongoing     Problem: Infection:  Goal: Will remain free from infection  Description: Will remain free from infection  Outcome: Ongoing     Problem: Safety:  Goal: Free from accidental physical injury  Description: Free from accidental physical injury  Outcome: Ongoing  Goal: Free from intentional harm  Description: Free from intentional harm  Outcome: Ongoing     Problem: Daily Care:  Goal: Daily care needs are met  Description: Daily care needs are met  Outcome: Ongoing     Problem: Pain:  Goal: Patient's pain/discomfort is manageable  Description: Patient's pain/discomfort is manageable  Outcome: Ongoing     Problem: Skin Integrity:  Goal: Skin integrity will stabilize  Description: Skin integrity will stabilize  Outcome: Ongoing     Problem: Discharge Planning:  Goal: Patients continuum of care needs are met  Description: Patients continuum of care needs are met  Outcome: Ongoing     Problem: Discharge Planning:  Goal: Discharged to appropriate level of care  Description: Discharged to appropriate level of care  Outcome: Ongoing  Goal: Participates in care planning  Description: Participates in care planning  Outcome: Ongoing     Problem: Airway Clearance - Ineffective:  Goal: Clear lung sounds  Description: Clear lung sounds  Outcome: Ongoing  Goal: Ability to maintain a clear airway will improve  Description: Ability to maintain a clear airway will improve  Outcome: Ongoing     Problem: Fluid Volume - Deficit:  Goal: Achieves intake and output within specified parameters  Description: Achieves intake and output within specified parameters  Outcome: Ongoing     Problem: Gas Exchange - Impaired:  Goal: Levels of oxygenation will improve  Description: Levels of oxygenation will improve  Outcome: Ongoing     Problem: Hyperthermia:  Goal: Ability to maintain a body temperature in the normal range will improve  Description: Ability to maintain a body temperature in the normal range will improve  Outcome: Ongoing     Problem: Tobacco Use:  Goal: Will participate in inpatient tobacco-use cessation counseling  Description: Will participate in inpatient tobacco-use cessation counseling  Outcome: Ongoing     Problem: Nutrition  Goal: Optimal nutrition therapy  Outcome: Ongoing     Problem: Musculor/Skeletal Functional Status  Goal: Highest potential functional level  Outcome: Ongoing  Goal: Absence of falls  Outcome: Ongoing

## 2021-03-27 NOTE — PROGRESS NOTES
IV blood transfusion in progress. Patient stable at this time. Will continue to monitor vital signs and symptoms.

## 2021-03-28 ENCOUNTER — APPOINTMENT (OUTPATIENT)
Dept: GENERAL RADIOLOGY | Age: 81
DRG: 177 | End: 2021-03-28
Payer: MEDICARE

## 2021-03-28 LAB
ANION GAP SERPL CALCULATED.3IONS-SCNC: 9 MMOL/L
BNP INTERPRETATION: ABNORMAL
BUN BLDV-MCNC: 7 MG/DL (ref 8–23)
BUN/CREAT BLD: 14 (ref 9–20)
CALCIUM SERPL-MCNC: 7.8 MG/DL (ref 8.6–10.4)
CHLORIDE BLD-SCNC: 94 MMOL/L (ref 98–107)
CO2: 21 MMOL/L (ref 20–31)
CREAT SERPL-MCNC: 0.5 MG/DL (ref 0.5–0.9)
CULTURE: NO GROWTH
EKG ATRIAL RATE: 250 BPM
EKG Q-T INTERVAL: 282 MS
EKG QRS DURATION: 82 MS
EKG QTC CALCULATION (BAZETT): 446 MS
EKG R AXIS: 5 DEGREES
EKG T AXIS: -126 DEGREES
EKG VENTRICULAR RATE: 151 BPM
GFR AFRICAN AMERICAN: >60 ML/MIN
GFR NON-AFRICAN AMERICAN: >60 ML/MIN
GFR SERPL CREATININE-BSD FRML MDRD: ABNORMAL ML/MIN/{1.73_M2}
GFR SERPL CREATININE-BSD FRML MDRD: ABNORMAL ML/MIN/{1.73_M2}
GLUCOSE BLD-MCNC: 105 MG/DL (ref 70–99)
HCT VFR BLD CALC: 26.7 % (ref 36.3–47.1)
HEMOGLOBIN: 7.9 G/DL (ref 11.9–15.1)
Lab: NORMAL
MCH RBC QN AUTO: 24.2 PG (ref 25.2–33.5)
MCHC RBC AUTO-ENTMCNC: 29.6 G/DL (ref 28.4–34.8)
MCV RBC AUTO: 81.9 FL (ref 82.6–102.9)
NRBC AUTOMATED: 0 PER 100 WBC
OSMOLALITY URINE: 459 MOSM/KG (ref 80–1300)
PDW BLD-RTO: 18.4 % (ref 11.8–14.4)
PLATELET # BLD: 102 K/UL (ref 138–453)
PMV BLD AUTO: 9.9 FL (ref 8.1–13.5)
POTASSIUM SERPL-SCNC: 4.6 MMOL/L (ref 3.7–5.3)
PRO-BNP: 7113 PG/ML
RBC # BLD: 3.26 M/UL (ref 3.95–5.11)
SODIUM BLD-SCNC: 123 MMOL/L (ref 135–144)
SODIUM BLD-SCNC: 124 MMOL/L (ref 135–144)
SODIUM BLD-SCNC: 124 MMOL/L (ref 135–144)
SODIUM,UR: 128 MMOL/L
SPECIMEN DESCRIPTION: NORMAL
TROPONIN INTERP: ABNORMAL
TROPONIN T: ABNORMAL NG/ML
TROPONIN, HIGH SENSITIVITY: 33 NG/L (ref 0–14)
TROPONIN, HIGH SENSITIVITY: 34 NG/L (ref 0–14)
TROPONIN, HIGH SENSITIVITY: 35 NG/L (ref 0–14)
TSH SERPL DL<=0.05 MIU/L-ACNC: 2.24 MIU/L (ref 0.3–5)
WBC # BLD: 7.9 K/UL (ref 3.5–11.3)

## 2021-03-28 PROCEDURE — 84295 ASSAY OF SERUM SODIUM: CPT

## 2021-03-28 PROCEDURE — 6360000002 HC RX W HCPCS: Performed by: INTERNAL MEDICINE

## 2021-03-28 PROCEDURE — 83935 ASSAY OF URINE OSMOLALITY: CPT

## 2021-03-28 PROCEDURE — 6360000002 HC RX W HCPCS: Performed by: NURSE PRACTITIONER

## 2021-03-28 PROCEDURE — 94640 AIRWAY INHALATION TREATMENT: CPT

## 2021-03-28 PROCEDURE — 99222 1ST HOSP IP/OBS MODERATE 55: CPT | Performed by: INTERNAL MEDICINE

## 2021-03-28 PROCEDURE — 94761 N-INVAS EAR/PLS OXIMETRY MLT: CPT

## 2021-03-28 PROCEDURE — 84439 ASSAY OF FREE THYROXINE: CPT

## 2021-03-28 PROCEDURE — 85027 COMPLETE CBC AUTOMATED: CPT

## 2021-03-28 PROCEDURE — 93010 ELECTROCARDIOGRAM REPORT: CPT | Performed by: FAMILY MEDICINE

## 2021-03-28 PROCEDURE — 36415 COLL VENOUS BLD VENIPUNCTURE: CPT

## 2021-03-28 PROCEDURE — 2580000003 HC RX 258: Performed by: INTERNAL MEDICINE

## 2021-03-28 PROCEDURE — 84484 ASSAY OF TROPONIN QUANT: CPT

## 2021-03-28 PROCEDURE — 83880 ASSAY OF NATRIURETIC PEPTIDE: CPT

## 2021-03-28 PROCEDURE — 93005 ELECTROCARDIOGRAM TRACING: CPT | Performed by: INTERNAL MEDICINE

## 2021-03-28 PROCEDURE — 2000000000 HC ICU R&B

## 2021-03-28 PROCEDURE — 2500000003 HC RX 250 WO HCPCS: Performed by: INTERNAL MEDICINE

## 2021-03-28 PROCEDURE — 51702 INSERT TEMP BLADDER CATH: CPT

## 2021-03-28 PROCEDURE — 71045 X-RAY EXAM CHEST 1 VIEW: CPT

## 2021-03-28 PROCEDURE — 84300 ASSAY OF URINE SODIUM: CPT

## 2021-03-28 PROCEDURE — 84443 ASSAY THYROID STIM HORMONE: CPT

## 2021-03-28 PROCEDURE — 80048 BASIC METABOLIC PNL TOTAL CA: CPT

## 2021-03-28 PROCEDURE — 6370000000 HC RX 637 (ALT 250 FOR IP): Performed by: INTERNAL MEDICINE

## 2021-03-28 RX ORDER — BUMETANIDE 0.25 MG/ML
0.5 INJECTION, SOLUTION INTRAMUSCULAR; INTRAVENOUS ONCE
Status: COMPLETED | OUTPATIENT
Start: 2021-03-28 | End: 2021-03-28

## 2021-03-28 RX ORDER — DEXTROSE MONOHYDRATE 50 MG/ML
INJECTION, SOLUTION INTRAVENOUS ONCE
Status: DISCONTINUED | OUTPATIENT
Start: 2021-03-28 | End: 2021-03-28

## 2021-03-28 RX ORDER — LORAZEPAM 2 MG/ML
0.5 INJECTION INTRAMUSCULAR ONCE
Status: COMPLETED | OUTPATIENT
Start: 2021-03-28 | End: 2021-03-28

## 2021-03-28 RX ORDER — METOPROLOL TARTRATE 5 MG/5ML
10 INJECTION INTRAVENOUS ONCE
Status: COMPLETED | OUTPATIENT
Start: 2021-03-28 | End: 2021-03-28

## 2021-03-28 RX ORDER — 3% SODIUM CHLORIDE 3 G/100ML
100 INJECTION, SOLUTION INTRAVENOUS ONCE
Status: COMPLETED | OUTPATIENT
Start: 2021-03-28 | End: 2021-03-28

## 2021-03-28 RX ORDER — SODIUM CHLORIDE 9 MG/ML
INJECTION, SOLUTION INTRAVENOUS CONTINUOUS
Status: DISCONTINUED | OUTPATIENT
Start: 2021-03-28 | End: 2021-03-29

## 2021-03-28 RX ADMIN — ALBUTEROL SULFATE 2.5 MG: 2.5 SOLUTION RESPIRATORY (INHALATION) at 09:54

## 2021-03-28 RX ADMIN — BUMETANIDE 0.5 MG: 0.25 INJECTION INTRAMUSCULAR; INTRAVENOUS at 16:09

## 2021-03-28 RX ADMIN — ALBUTEROL SULFATE 2.5 MG: 2.5 SOLUTION RESPIRATORY (INHALATION) at 16:30

## 2021-03-28 RX ADMIN — SODIUM CHLORIDE, PRESERVATIVE FREE 10 ML: 5 INJECTION INTRAVENOUS at 08:58

## 2021-03-28 RX ADMIN — WATER 1000 MG: 1 INJECTION INTRAMUSCULAR; INTRAVENOUS; SUBCUTANEOUS at 23:44

## 2021-03-28 RX ADMIN — ALBUTEROL SULFATE 2.5 MG: 2.5 SOLUTION RESPIRATORY (INHALATION) at 19:54

## 2021-03-28 RX ADMIN — AZITHROMYCIN MONOHYDRATE 500 MG: 500 INJECTION, POWDER, LYOPHILIZED, FOR SOLUTION INTRAVENOUS at 23:45

## 2021-03-28 RX ADMIN — LORAZEPAM 0.5 MG: 2 INJECTION INTRAMUSCULAR; INTRAVENOUS at 10:47

## 2021-03-28 RX ADMIN — SODIUM CHLORIDE: 9 INJECTION, SOLUTION INTRAVENOUS at 20:29

## 2021-03-28 RX ADMIN — LINEZOLID 600 MG: 600 INJECTION, SOLUTION INTRAVENOUS at 11:14

## 2021-03-28 RX ADMIN — CYANOCOBALAMIN TAB 1000 MCG 500 MCG: 1000 TAB at 08:47

## 2021-03-28 RX ADMIN — METOPROLOL TARTRATE 10 MG: 5 INJECTION INTRAVENOUS at 10:46

## 2021-03-28 RX ADMIN — LISINOPRIL 10 MG: 10 TABLET ORAL at 08:46

## 2021-03-28 RX ADMIN — SODIUM CHLORIDE 100 ML/HR: 3 INJECTION, SOLUTION INTRAVENOUS at 13:59

## 2021-03-28 RX ADMIN — POTASSIUM CHLORIDE AND SODIUM CHLORIDE: 900; 300 INJECTION, SOLUTION INTRAVENOUS at 11:44

## 2021-03-28 RX ADMIN — Medication 5000 UNITS: at 08:47

## 2021-03-28 RX ADMIN — LINEZOLID 600 MG: 600 INJECTION, SOLUTION INTRAVENOUS at 00:17

## 2021-03-28 RX ADMIN — PANTOPRAZOLE SODIUM 40 MG: 40 TABLET, DELAYED RELEASE ORAL at 08:57

## 2021-03-28 NOTE — PROGRESS NOTES
Patient became short of breath and went into fast heart rate-EKG done and showed Atrial fibrillation confirmed by Dr Adam Beauchamp. Blood pressure 191/102 HR:135 Oxygen stable at this time but oxygen supplemental applied to help with comfort.

## 2021-03-28 NOTE — PROGRESS NOTES
Marven Fothergill M.D. ICU Progress Note 3/28/21    SUBJECTIVE:    I was asked to come see pt acutely due to acute respiratory distress, tachycardia and hypertension. Upon entering room, pt appears to be in acute distress,  at bedside holding her hand. EKG in process (reveals atrial fib with RVR). 's. Denies chest pain or diaphoresis. C/o SOB and palpitations / racing heart rate. ROS:   Constitutional: negative  for fevers, and negative for chills. Respiratory: positive for shortness of breath, negative for cough, and positive for wheezing  Cardiovascular: negative for chest pain, and positive for palpitations  Gastrointestinal: negative for abdominal pain, negative for nausea,negative for vomiting, negative for diarrhea, and negative for constipation    All other systems were reviewed with the patient and are negative unless otherwise stated in HPI. OBJECTIVE:    Vitals:   Temp: 98.3 °F (36.8 °C)  Temp range:    Temp  Av.3 °F (36.8 °C)  Min: 97.3 °F (36.3 °C)  Max: 99.7 °F (37.6 °C)    BP: (!) 191/102  BP Range:      Systolic (90ECJ), SRD:339 , Min:133 , CD      Diastolic (01YXP), AUQ:88, Min:56, Max:102    Pulse: (!) 159  Pulse Range:    Pulse  Av.9  Min: 70  Max: 159    Resp: 16  Resp Range:   Resp  Av  Min: 16  Max: 16    SpO2: 96 % on supplemental O2  SpO2 range:   SpO2  Av.2 %  Min: 93 %  Max: 99 %    24HR INTAKE/OUTPUT:      Intake/Output Summary (Last 24 hours) at 3/28/2021 1040  Last data filed at 3/27/2021 2037  Gross per 24 hour   Intake 3338.68 ml   Output 500 ml   Net 2838.68 ml     -----------------------------------------------------------------  Exam:  GEN:    Awake, alert and oriented x3. EYES:  EOMI, pupils equal   NECK: Supple. No lymphadenopathy. No carotid bruit  CVS:    irregularly irregular, tachy.  no audible murmur  PULM:  diminished with bilateral expiratory wheezing, moderate acute respiratory distress  ABD:    Bowels sounds normal. Abdomen is soft. No distention. no tenderness to palpation. EXT:   no edema bilaterally . No calf tenderness. NEURO: Moves all extremities. Motor and sensory are grossly intact  SKIN:  No rashes. No skin lesions.     -----------------------------------------------------------------  Diagnostic Data:    · All lines, drips, IV sites and medications reviewed  · All available data reviewed  Lab Results   Component Value Date    WBC 7.9 03/28/2021    HGB 7.9 (L) 03/28/2021    MCV 81.9 (L) 03/28/2021     (L) 03/28/2021     Lab Results   Component Value Date    SEGS 80 (H) 03/25/2021    LYMPHOPCT 12 (L) 03/25/2021    MONOPCT 8 03/25/2021    EOSRELPCT 0 (L) 03/25/2021    BASOPCT 0 03/25/2021    NEUTROABS 5.00 03/25/2021      Lab Results   Component Value Date    GLUCOSE 105 (H) 03/28/2021    BUN 7 (L) 03/28/2021    CREATININE 0.50 03/28/2021     (LL) 03/28/2021    K 4.6 03/28/2021    CALCIUM 7.8 (L) 03/28/2021    CL 87 (L) 03/28/2021    CO2 21 03/28/2021     Lab Results   Component Value Date    LACTA 1.0 03/25/2021       FL MODIFIED BARIUM SWALLOW W VIDEO   Final Result   No evidence for aspiration. Please see separate speech pathology report for full discussion of findings   and recommendations. CT head without contrast   Final Result   [   Old lacunar infarction in the right basal ganglia      Mild central and cortical cerebral atrophy. Mild chronic deep white matter ischemic changes      No acute intracranial abnormalities are noted. XR CHEST PORTABLE   Final Result   Bibasilar opacity and left pleural effusion, concerning for multifocal   pneumonia. ASSESSMENT / PLAN:  · Acute respiratory distress  ? Likely due to new onset atrial fibrillation  ? Transfer to ICU  ? supplemental O2  ? Stat port CXR  ? Stat Troponin, BNP  · New onset atrial fibrillation with RVR  ? ICU transfer   ? Lopressor 10 mg IV x 1 now  ? Diltiazem drip  ?  Cardiology consult  · Pneumoniaconcern for aspiration  ? IV Rocephin/Zithromax  ? Appreciate speech therapy for swallow eval  ? Suction as needed  · Dehydration  ? IVF's  ? Monitor renal function / lytes  · Anemia   ? Hb dropped from 8.3 to 6.6  ? S/p 1 u PRBC transfusion yesterday - repeat Hb 7.9  ? Check stool quaiac  ? Continue vitamin B12  ? Daily CBC  · Generalized weakness  ? PT/OT  · Hypokalemia  ? Improved with replacement  · Hyponatremia   ? 1500 mL fluid restriction  ? Nephrology consult initiated  · Nutrition status: moderate malnutrition: dietary consult initiated during hositalization  · DVT prophylaxis: Lovenox  · High risk medications: Diltiazem drip   · Total critical care time caring for this patient with life threatening, unstable organ failure, including direct patient contact, management of life support systems, review of data including imaging and labs, discussions with other team members and physicians at least 40 minutes so far today, excluding separately billable procedures.     Kate Marina M.D.  3/28/2021  10:40 AM

## 2021-03-28 NOTE — PROGRESS NOTES
Just spoke to staff RN  The serum sodium of 114 mEq/l this morning was a lab error and the corrected sodium was actually 124 mEq/l  Will resume 0.9 saline at 40 ml/hr  Discontinue dextroxe bolus  Change serum sodium check to every 6 hrs from every 4 hrs   Discussed with staff

## 2021-03-28 NOTE — CONSULTS
Nephrology Consult Note  Patient's Name: Karlos Lozada  12:47 PM  3/28/2021    Nephrologist: Makeda Vaughan  This is a telehealth video visit due to ongoing pandemic. Reason for Consult: Severe hyponatremia  Requesting Physician:  Heather Montoya MD  PCP: STEVE Naylor - [place]    Chief Complaint: Fatigue. Weakness. Poor appetite. Poor response. Assessment  1. Hyponatremia severe and likely due to combination of  poor oral intake with dehydration and likely compounded by pneumonia as well as pembrolizumab which can cause hyponatremia. 2. Lethargic, weakness with poor response may be due to severe hyponatremia  3. Pneumonia versus congestive heart failure? 4. Left renal cell carcinoma treated with pembrolizumab  5. Elevated troponin level of uncertain significant  6. Malnutrition as evidenced by low BUN level and weight loss  7. Hypocalcemia likely will correct if corrected for low serum albumin level  8. Weight loss may be due to combination of poor oral intake as well as ongoing neoplastic process. 9. Anemia likely of chronic disease  10. Thrombocytopenia may be due to recent treatment with linezolid known to cause thrombocytopenia    Plan    1. I discussed my thought processes at length with the patient's  via telehealth video. 2. He understood. 3. I addressed his questions. 4. Patient is too ill to have any meaningful discussion with me. 5. Agree with transfer to critical care block. 6. 3% saline to be given over 2 hours since she is symptomatic with hyponatremia. 7. Repeat serum sodium level this evening and every 4 hours  8. Random urine sodium  9. Urine osmolality  10. Serum osmolality  11. Thyroid studies  12. Hold 0.9% sodium infusion for now  13. Bumetanide 0.5 mg IV once after the infusion of 3% is completed since  chest x-ray is also suggestive of possible congestive heart failure  14. This was discussed with staff  15.  We will give about 24 hours to see how she responds to the treatment  16. If response is unsatisfactory we will consider transfer to [place]  17. This was discussed with the  who is in agreement  18. Need very close monitoring      History Obtained From: , staff and electronic medical record  History of Present Ilness:    Allen Mccain is a 79 y.o. female with history of a left renal cell carcinoma being treated at [address] at [facility], cerebrovascular accident, hypertension among other things who was admitted to [address] [place] [address] about 3 days ago because of  weakness, lethargy, poor oral intake and poor response. She was noted to be hyponatremic with serum sodium of 124 mEq/L. Admitted for evaluation and management. She has been receiving 0.9 saline since then with no improvement in serum sodium. Today it is 114 mEq/L. As a result we were asked to see her for evaluation and management. Patient was seen via video telehealth initiated by the staff RN. The discussion was mostly with the  as patient is not able to have any meaningful discussion with me. However according to the admission record, there was no chest pain or shortness of breath. No fever or chills. No abdominal pain. No headache. She had become progressively weaker and weaker with poor oral intake and about 50 pounds weight loss since 2021. She also did have some nausea with some vomiting. Review of her record reveals a serum sodium has ranged between 124 mEq/L to 128 mEq/L going back to October 2020. There was a question of cough. Past Medical History:   Diagnosis Date    B12 deficiency     CVA (cerebral vascular accident) ([facility] [address] 2018    left middle cerebral artery stenosis    CVD (cerebrovascular disease)     Hypertension        No past surgical history on file.     Family History   Problem Relation Age of Onset    Coronary Art Dis Maternal Cousin     Cancer Father         prostate    Cancer Paternal Grandfather         stomach        reports that she has never smoked. She has never used smokeless tobacco. She reports that she does not drink alcohol or use drugs. Allergies:  Mirtazapine and Zoloft [sertraline]    Current Medications:    0.9 % sodium chloride infusion, PRN  Vitamin D (CHOLECALCIFEROL) tablet 5,000 Units, Daily  vitamin B-12 (CYANOCOBALAMIN) tablet 500 mcg, BID  hydrOXYzine (ATARAX) tablet 25 mg, Nightly  lisinopril (PRINIVIL;ZESTRIL) tablet 10 mg, BID  pantoprazole (PROTONIX) tablet 40 mg, QAM AC  azithromycin (ZITHROMAX) 500 mg in D5W 250ml addavial, Q24H  cefTRIAXone (ROCEPHIN) 1,000 mg in sterile water 10 mL IV syringe, Q24H  sodium chloride flush 0.9 % injection 10 mL, 2 times per day  sodium chloride flush 0.9 % injection 10 mL, PRN  enoxaparin (LOVENOX) injection 40 mg, Daily  promethazine (PHENERGAN) tablet 12.5 mg, Q6H PRN    Or  ondansetron (ZOFRAN) injection 4 mg, Q6H PRN  polyethylene glycol (GLYCOLAX) packet 17 g, Daily PRN  acetaminophen (TYLENOL) tablet 650 mg, Q6H PRN    Or  acetaminophen (TYLENOL) suppository 650 mg, Q6H PRN  potassium chloride (KLOR-CON M) extended release tablet 40 mEq, Once  0.9% NaCl with KCl 40 mEq infusion, Continuous  albuterol (PROVENTIL) nebulizer solution 2.5 mg, TID  linezolid (ZYVOX) IVPB 600 mg, Q12H        Review of Systems:   Pertinent positives stated above in HPI. All other systems were reviewed and were negative. ROS: As in HPI    Physical exam:   Constitutional: Ill looking elderly lady in a bed answering questions but otherwise did not fully participate in the interview. Vitals:   Vitals:    03/28/21 1230   BP: (!) 118/98   Pulse: 70   Resp:    Temp:    SpO2:        Skin: No rash per staff  Heent:   Throat is clear.   Oral mucosa appears moist  Neck: No prominent thyroid gland  Cardiovascular: Sinus rhythm per staff  Respiratory: Clear with no wheezes,rhonchi or rales per staff  Abdomen: Not examined  Ext: No lower extremity edema per staff and patient's   Psychiatric: Deferred  CNS: Lethargic    Data:   Labs:  Lab Results   Component Value Date     (LL) 03/28/2021     (L) 03/27/2021     (L) 03/26/2021    K 4.6 03/28/2021    K 3.8 03/27/2021    K 3.0 (L) 03/26/2021    CL 87 (L) 03/28/2021    CO2 21 03/28/2021    CO2 24 03/27/2021    CO2 25 03/26/2021    CREATININE 0.50 03/28/2021    CREATININE 0.53 03/27/2021    CREATININE 0.57 03/26/2021    BUN 7 (L) 03/28/2021    BUN 6 (L) 03/27/2021    BUN 7 (L) 03/26/2021    GLUCOSE 105 (H) 03/28/2021    GLUCOSE 107 (H) 03/27/2021    GLUCOSE 131 (H) 03/26/2021    WBC 7.9 03/28/2021    WBC 5.4 03/27/2021    WBC 6.3 03/25/2021    HGB 7.9 (L) 03/28/2021    HGB 7.8 (L) 03/27/2021    HGB 6.6 (LL) 03/27/2021    HCT 26.7 (L) 03/28/2021    HCT 25.1 (L) 03/27/2021    HCT 21.2 (L) 03/27/2021    MCV 81.9 (L) 03/28/2021     (L) 03/28/2021     {Labs reviewed    Imaging:  CXR results: Report and images reviewed        Thank you Dr. Sandy Echavarria, APRN - CNP for allowing us to participate in care of Kishan Omalley   **This report has been created using voice recognition software. It maycontain minor  errors which are inherent in voice recognition technology. **    Electronically signed by Celestine Culver MD on 3/28/2021 at 12:47 PM

## 2021-03-28 NOTE — PROGRESS NOTES
Called Critical lab value to Dr Mathieu Solares patient with sodium at 114-Dr Jd Otero is coming in shortly and will decide on treatment then.

## 2021-03-28 NOTE — PROGRESS NOTES
Patient room cleared of trash and dirty linens at this time. Patient also refused offer of bed bath at this time.

## 2021-03-28 NOTE — PROGRESS NOTES
[facility] SPECIALTY [facility]  OCCUPATIONAL THERAPY  No Visit Note    [x] ICU    [] Acute   Patient: Art Shepherd  Room: I305/I305-01      Art Shepherd not seen on 3/28/2021 at 2:17 PM due to pt transferred to ICU and new OT orders needed.            Signature: NICK Hartley

## 2021-03-28 NOTE — PROGRESS NOTES
Resting comfortably in bed.  Nephrology called for past medical history and update on patient-update given and he will call back when he gets to the hospital to do a video visit with patient and family

## 2021-03-28 NOTE — PLAN OF CARE
Problem: Falls - Risk of:  Goal: Will remain free from falls  Description: Will remain free from falls  3/28/2021 0113 by Karen Jiménez RN  Outcome: Ongoing  Note: Patient is alert and oriented and has demonstrated the use of using the call light for assistance before getting up. Education has been provided to defer the use of the bed alarm and/or restraint free alarm as the patient has shown competency in calling for assistance and verbalizing the risk. Problem: Falls - Risk of:  Goal: Absence of physical injury  Description: Absence of physical injury  3/28/2021 0113 by Karen Jiménez RN  Outcome: Ongoing  Note: Patient free from physical injury at this time. Problem: Skin Integrity:  Goal: Will show no infection signs and symptoms  Description: Will show no infection signs and symptoms  3/28/2021 0113 by Karen Jiménez RN  Outcome: Ongoing     Problem: Skin Integrity:  Goal: Absence of new skin breakdown  Description: Absence of new skin breakdown  3/28/2021 0113 by Karen Jiménez RN  Outcome: Ongoing  Note: Skin assessment performed, no new breakdown noted at this time. Patient skin is dry and intact. Will continue to monitor for further redness or breakdown. Problem: Infection:  Goal: Will remain free from infection  Description: Will remain free from infection  3/28/2021 0113 by Karen Jiménez RN  Outcome: Ongoing  Note: Patient receives zyvox, zosyn, and zithromax for pneumonia infection management     Problem: Safety:  Goal: Free from accidental physical injury  Description: Free from accidental physical injury  3/28/2021 0113 by Karen Jiménez RN  Outcome: Ongoing  Note: Bed is in lowest position, wheels locked, side rails up x2, non skid socks applied, bed alarm on, call light within reach to help prevent accidental physical injury.       Problem: Safety:  Goal: Free from intentional harm  Description: Free from intentional harm  3/28/2021 0113 by Karen Jiménez RN Deficit:  Goal: Achieves intake and output within specified parameters  Description: Achieves intake and output within specified parameters  3/28/2021 0113 by Trisha Mittal RN  Outcome: Ongoing  Note: Patient has poor oral intake. Encourage sips of water or bites of ice cream, but patient refuses everything except what is necessary to swallow medications. Problem: Gas Exchange - Impaired:  Goal: Levels of oxygenation will improve  Description: Levels of oxygenation will improve  3/28/2021 0113 by Trisha Mittal RN  Outcome: Ongoing  Note: Spo2 is WNL on room air at this time. Problem: Hyperthermia:  Goal: Ability to maintain a body temperature in the normal range will improve  Description: Ability to maintain a body temperature in the normal range will improve  3/28/2021 0113 by Trisha Mittal RN  Outcome: Ongoing  Note: Patient temporal temperature is WNL. Will continue to monitor.       Problem: Tobacco Use:  Goal: Will participate in inpatient tobacco-use cessation counseling  Description: Will participate in inpatient tobacco-use cessation counseling  3/28/2021 0113 by Trisha Mittal RN  Outcome: Ongoing     Problem: Nutrition  Goal: Optimal nutrition therapy  3/28/2021 0113 by Trisha Mittal RN  Outcome: Ongoing     Problem: Musculor/Skeletal Functional Status  Goal: Highest potential functional level  3/28/2021 0113 by Trisha Mittal RN  Outcome: Ongoing     Problem: Musculor/Skeletal Functional Status  Goal: Absence of falls  3/28/2021 0113 by Trisha Mittal RN  Outcome: Ongoing  Note: patient

## 2021-03-28 NOTE — PROGRESS NOTES
[facility]  Inpatient/Observation/Outpatient Rehabilitation    Date: 3/28/2021  Patient Name: Yady Morrison       [x] Inpatient Acute/Observation       []  Outpatient  : 1940       Pt held this date d/t transferred to ICU. Pt will need new orders and reassessment to resume physical therapy when medically appropriate.        [place], [place] Date: 3/28/2021

## 2021-03-28 NOTE — PROGRESS NOTES
Writer called by patient  due to being short of breath patient stated \" I cant breath please sit me up\". Writer inclined patient with no change of breathing status. Administered breathing medication and writer noticed Patient was soiled at this time. Writer changed patient as she continued to complain of shortness of breath and also heart rate in the 130's. RN, and Dr. Kathrine Greer aware.

## 2021-03-29 ENCOUNTER — HOSPITAL ENCOUNTER (OUTPATIENT)
Dept: NON INVASIVE DIAGNOSTICS | Age: 81
Discharge: HOME | End: 2021-03-29
Payer: MEDICARE

## 2021-03-29 LAB
ABSOLUTE EOS #: <0.03 K/UL (ref 0–0.44)
ABSOLUTE IMMATURE GRANULOCYTE: 0.08 K/UL (ref 0–0.3)
ABSOLUTE LYMPH #: 1.31 K/UL (ref 1.1–3.7)
ABSOLUTE MONO #: 0.51 K/UL (ref 0.1–1.2)
ANION GAP SERPL CALCULATED.3IONS-SCNC: 7 MMOL/L (ref 9–17)
BASOPHILS # BLD: 0 % (ref 0–2)
BASOPHILS ABSOLUTE: <0.03 K/UL (ref 0–0.2)
BUN BLDV-MCNC: 8 MG/DL (ref 8–23)
BUN/CREAT BLD: 13 (ref 9–20)
CALCIUM SERPL-MCNC: 8.1 MG/DL (ref 8.6–10.4)
CHLORIDE BLD-SCNC: 94 MMOL/L (ref 98–107)
CO2: 22 MMOL/L (ref 20–31)
CREAT SERPL-MCNC: 0.62 MG/DL (ref 0.5–0.9)
DATE, STOOL #1: NORMAL
DATE, STOOL #2: NORMAL
DATE, STOOL #3: NORMAL
DIFFERENTIAL TYPE: ABNORMAL
EOSINOPHILS RELATIVE PERCENT: 0 % (ref 1–4)
GFR AFRICAN AMERICAN: >60 ML/MIN
GFR NON-AFRICAN AMERICAN: >60 ML/MIN
GFR SERPL CREATININE-BSD FRML MDRD: ABNORMAL ML/MIN/{1.73_M2}
GFR SERPL CREATININE-BSD FRML MDRD: ABNORMAL ML/MIN/{1.73_M2}
GLUCOSE BLD-MCNC: 116 MG/DL (ref 70–99)
HCT VFR BLD CALC: 26.5 % (ref 36.3–47.1)
HEMOCCULT SP1 STL QL: NEGATIVE
HEMOCCULT SP2 STL QL: NORMAL
HEMOCCULT SP3 STL QL: NORMAL
HEMOGLOBIN: 8.1 G/DL (ref 11.9–15.1)
IMMATURE GRANULOCYTES: 1 %
LV EF: 55 %
LVEF MODALITY: NORMAL
LYMPHOCYTES # BLD: 15 % (ref 24–43)
MCH RBC QN AUTO: 24.7 PG (ref 25.2–33.5)
MCHC RBC AUTO-ENTMCNC: 30.6 G/DL (ref 28.4–34.8)
MCV RBC AUTO: 80.8 FL (ref 82.6–102.9)
MONOCYTES # BLD: 6 % (ref 3–12)
NRBC AUTOMATED: 0 PER 100 WBC
OSMOLALITY URINE: 495 MOSM/KG (ref 80–1300)
PDW BLD-RTO: 18.7 % (ref 11.8–14.4)
PLATELET # BLD: ABNORMAL K/UL (ref 138–453)
PLATELET ESTIMATE: ABNORMAL
PLATELET, FLUORESCENCE: 82 K/UL (ref 138–453)
PLATELET, IMMATURE FRACTION: 3.8 % (ref 1.1–10.3)
PMV BLD AUTO: ABNORMAL FL (ref 8.1–13.5)
POTASSIUM SERPL-SCNC: 4.2 MMOL/L (ref 3.7–5.3)
RBC # BLD: 3.28 M/UL (ref 3.95–5.11)
RBC # BLD: ABNORMAL 10*6/UL
SEG NEUTROPHILS: 78 % (ref 36–65)
SEGMENTED NEUTROPHILS ABSOLUTE COUNT: 6.74 K/UL (ref 1.5–8.1)
SODIUM BLD-SCNC: 123 MMOL/L (ref 135–144)
SODIUM BLD-SCNC: 123 MMOL/L (ref 135–144)
SODIUM BLD-SCNC: 125 MMOL/L (ref 135–144)
SODIUM BLD-SCNC: 126 MMOL/L (ref 135–144)
SODIUM,UR: 145 MMOL/L
TIME, STOOL #1: NORMAL
TIME, STOOL #2: NORMAL
TIME, STOOL #3: NORMAL
TROPONIN INTERP: ABNORMAL
TROPONIN T: ABNORMAL NG/ML
TROPONIN, HIGH SENSITIVITY: 32 NG/L (ref 0–14)
URIC ACID: 1.8 MG/DL (ref 2.4–5.7)
WBC # BLD: 8.7 K/UL (ref 3.5–11.3)
WBC # BLD: ABNORMAL 10*3/UL

## 2021-03-29 PROCEDURE — 2580000003 HC RX 258: Performed by: NURSE PRACTITIONER

## 2021-03-29 PROCEDURE — 2580000003 HC RX 258: Performed by: INTERNAL MEDICINE

## 2021-03-29 PROCEDURE — 97530 THERAPEUTIC ACTIVITIES: CPT

## 2021-03-29 PROCEDURE — 6360000002 HC RX W HCPCS: Performed by: NURSE PRACTITIONER

## 2021-03-29 PROCEDURE — 82272 OCCULT BLD FECES 1-3 TESTS: CPT

## 2021-03-29 PROCEDURE — 99222 1ST HOSP IP/OBS MODERATE 55: CPT | Performed by: FAMILY MEDICINE

## 2021-03-29 PROCEDURE — 6360000002 HC RX W HCPCS: Performed by: INTERNAL MEDICINE

## 2021-03-29 PROCEDURE — 6370000000 HC RX 637 (ALT 250 FOR IP): Performed by: INTERNAL MEDICINE

## 2021-03-29 PROCEDURE — 97164 PT RE-EVAL EST PLAN CARE: CPT

## 2021-03-29 PROCEDURE — 83935 ASSAY OF URINE OSMOLALITY: CPT

## 2021-03-29 PROCEDURE — 85025 COMPLETE CBC W/AUTO DIFF WBC: CPT

## 2021-03-29 PROCEDURE — 97162 PT EVAL MOD COMPLEX 30 MIN: CPT

## 2021-03-29 PROCEDURE — 94664 DEMO&/EVAL PT USE INHALER: CPT

## 2021-03-29 PROCEDURE — 85055 RETICULATED PLATELET ASSAY: CPT

## 2021-03-29 PROCEDURE — 84295 ASSAY OF SERUM SODIUM: CPT

## 2021-03-29 PROCEDURE — 94640 AIRWAY INHALATION TREATMENT: CPT

## 2021-03-29 PROCEDURE — 93306 TTE W/DOPPLER COMPLETE: CPT

## 2021-03-29 PROCEDURE — 97167 OT EVAL HIGH COMPLEX 60 MIN: CPT

## 2021-03-29 PROCEDURE — 36415 COLL VENOUS BLD VENIPUNCTURE: CPT

## 2021-03-29 PROCEDURE — 6370000000 HC RX 637 (ALT 250 FOR IP): Performed by: NURSE PRACTITIONER

## 2021-03-29 PROCEDURE — 80048 BASIC METABOLIC PNL TOTAL CA: CPT

## 2021-03-29 PROCEDURE — 84300 ASSAY OF URINE SODIUM: CPT

## 2021-03-29 PROCEDURE — 1200000000 HC SEMI PRIVATE

## 2021-03-29 PROCEDURE — 94761 N-INVAS EAR/PLS OXIMETRY MLT: CPT

## 2021-03-29 PROCEDURE — 99232 SBSQ HOSP IP/OBS MODERATE 35: CPT | Performed by: INTERNAL MEDICINE

## 2021-03-29 PROCEDURE — 84484 ASSAY OF TROPONIN QUANT: CPT

## 2021-03-29 PROCEDURE — 84550 ASSAY OF BLOOD/URIC ACID: CPT

## 2021-03-29 RX ORDER — LISINOPRIL 5 MG/1
5 TABLET ORAL 2 TIMES DAILY
Status: DISCONTINUED | OUTPATIENT
Start: 2021-03-29 | End: 2021-03-30

## 2021-03-29 RX ORDER — SODIUM CHLORIDE 9 MG/ML
INJECTION, SOLUTION INTRAVENOUS CONTINUOUS
Status: DISCONTINUED | OUTPATIENT
Start: 2021-03-29 | End: 2021-03-29

## 2021-03-29 RX ORDER — SODIUM CHLORIDE 1000 MG
2 TABLET, SOLUBLE MISCELLANEOUS
Status: DISCONTINUED | OUTPATIENT
Start: 2021-03-29 | End: 2021-04-02 | Stop reason: HOSPADM

## 2021-03-29 RX ADMIN — METOPROLOL TARTRATE 25 MG: 25 TABLET, FILM COATED ORAL at 08:30

## 2021-03-29 RX ADMIN — METOPROLOL TARTRATE 25 MG: 25 TABLET, FILM COATED ORAL at 20:52

## 2021-03-29 RX ADMIN — ALBUTEROL SULFATE 2.5 MG: 2.5 SOLUTION RESPIRATORY (INHALATION) at 15:22

## 2021-03-29 RX ADMIN — WATER 1000 MG: 1 INJECTION INTRAMUSCULAR; INTRAVENOUS; SUBCUTANEOUS at 23:49

## 2021-03-29 RX ADMIN — AZITHROMYCIN DIHYDRATE 500 MG: 500 INJECTION, POWDER, LYOPHILIZED, FOR SOLUTION INTRAVENOUS at 23:50

## 2021-03-29 RX ADMIN — SODIUM CHLORIDE TAB 1 GM 2 G: 1 TAB at 16:16

## 2021-03-29 RX ADMIN — CYANOCOBALAMIN TAB 1000 MCG 500 MCG: 1000 TAB at 08:30

## 2021-03-29 RX ADMIN — SODIUM CHLORIDE TAB 1 GM 2 G: 1 TAB at 11:20

## 2021-03-29 RX ADMIN — SODIUM CHLORIDE, PRESERVATIVE FREE 10 ML: 5 INJECTION INTRAVENOUS at 20:56

## 2021-03-29 RX ADMIN — LISINOPRIL 5 MG: 5 TABLET ORAL at 20:52

## 2021-03-29 RX ADMIN — ALBUTEROL SULFATE 2.5 MG: 2.5 SOLUTION RESPIRATORY (INHALATION) at 11:32

## 2021-03-29 RX ADMIN — PANTOPRAZOLE SODIUM 40 MG: 40 TABLET, DELAYED RELEASE ORAL at 07:29

## 2021-03-29 RX ADMIN — LISINOPRIL 5 MG: 5 TABLET ORAL at 08:30

## 2021-03-29 RX ADMIN — CYANOCOBALAMIN TAB 1000 MCG 500 MCG: 1000 TAB at 16:16

## 2021-03-29 RX ADMIN — HYDROXYZINE HYDROCHLORIDE 25 MG: 25 TABLET, FILM COATED ORAL at 20:52

## 2021-03-29 RX ADMIN — ALBUTEROL SULFATE 2.5 MG: 2.5 SOLUTION RESPIRATORY (INHALATION) at 20:41

## 2021-03-29 ASSESSMENT — PAIN SCALES - GENERAL: PAINLEVEL_OUTOF10: 0

## 2021-03-29 NOTE — PLAN OF CARE
Problem: Falls - Risk of:  Goal: Will remain free from falls  Description: Will remain free from falls  Outcome: Ongoing  Goal: Absence of physical injury  Description: Absence of physical injury  Outcome: Ongoing     Problem: Skin Integrity:  Goal: Will show no infection signs and symptoms  Description: Will show no infection signs and symptoms  Outcome: Ongoing  Goal: Absence of new skin breakdown  Description: Absence of new skin breakdown  Outcome: Ongoing     Problem: Infection:  Goal: Will remain free from infection  Description: Will remain free from infection  Note: Currently on ATB therapy for respiratory infection. Problem: Safety:  Goal: Free from accidental physical injury  Description: Free from accidental physical injury  Outcome: Ongoing  Goal: Free from intentional harm  Description: Free from intentional harm  Outcome: Ongoing     Problem: Daily Care:  Goal: Daily care needs are met  Description: Daily care needs are met  Outcome: Ongoing     Problem: Pain:  Goal: Patient's pain/discomfort is manageable  Description: Patient's pain/discomfort is manageable  Note: Denies any pain at this time. Problem: Skin Integrity:  Goal: Skin integrity will stabilize  Description: Skin integrity will stabilize  Outcome: Ongoing     Problem: Discharge Planning:  Goal: Patients continuum of care needs are met  Description: Patients continuum of care needs are met  Outcome: Ongoing     Problem: Discharge Planning:  Goal: Discharged to appropriate level of care  Description: Discharged to appropriate level of care  Outcome: Ongoing  Goal: Participates in care planning  Description: Participates in care planning  Outcome: Ongoing     Problem: Airway Clearance - Ineffective:  Goal: Clear lung sounds  Description: Clear lung sounds  Note: Breath sounds clear.  No cough  Goal: Ability to maintain a clear airway will improve  Description: Ability to maintain a clear airway will improve  Outcome: Ongoing Problem: Fluid Volume - Deficit:  Goal: Achieves intake and output within specified parameters  Description: Achieves intake and output within specified parameters  Note: MIV 0.9% NS at 40ml/hr     Problem: Gas Exchange - Impaired:  Goal: Levels of oxygenation will improve  Description: Levels of oxygenation will improve  Note: )2 sat 92% on room air.      Problem: Hyperthermia:  Goal: Ability to maintain a body temperature in the normal range will improve  Description: Ability to maintain a body temperature in the normal range will improve  Note: Afebrile 98.2     Problem: Tobacco Use:  Goal: Will participate in inpatient tobacco-use cessation counseling  Description: Will participate in inpatient tobacco-use cessation counseling  Outcome: Not Met This Shift     Problem: Nutrition  Goal: Optimal nutrition therapy  Outcome: Ongoing     Problem: Musculor/Skeletal Functional Status  Goal: Highest potential functional level  Outcome: Ongoing  Goal: Absence of falls  Outcome: Ongoing

## 2021-03-29 NOTE — CONSULTS
[address]                                                                 Clinical Pharmacy Consult              Kathy Allen is a 79 y.o. female whose chart and medications have been reviewed. Reason for medication review:  Request per nephrology to dilute azithromycin in NS instead of D5W due to hyponatremia. Requesting Physician: Dr. Vikas Jacinto          - Azithromycin order changed to 500 mg in sodium chloride 0.9% 250 mL IVPB. Azithromycin 500 mg and sodium chloride 250 mL bag to be dispensed from main pharmacy. Thank you. Mi Reis.  Jey Díaz

## 2021-03-29 NOTE — CONSULTS
Gurmeet Wheat am scribing for and in the presence of Jeff Dahl. Ila Mcgregor MD, MS, F.A.C.C..    Patient: Queen Lili  : 1940  Date of Admission: 3/25/2021  Primary Care Physician: Emani Wilder  Today's Date: 3/29/2021    REASON FOR CONSULTATION: Altered Mental Status (family stating pt is not acting right)      HPI: Ms. Lynne Estrada is a 79 y.o. female who was admitted to the hospital for altered mental status. She also had an elevated Troponin level. She also has been going in and out of atrial fibrillation. She also was admitted due to severe anemia. She has no previous cardi history. Ms. Lynne Estrada did seem a bit confused today when we visited with her. I asked about who was in the room with her and she was unable to tell me. She is not eating or drinking very well. She denied any current or recent chest pain, abdominal pain, bleeding problems, problems with her medications or any other concerns at this time. Past Medical History:   Diagnosis Date    B12 deficiency     CVA (cerebral vascular accident) ([facility] [address] 2018    left middle cerebral artery stenosis    CVD (cerebrovascular disease)     Hypertension        CURRENT ALLERGIES: Mirtazapine and Zoloft [sertraline] REVIEW OF SYSTEMS: 14 systems were reviewed. Pertinent positives and negatives as above, all else negative. No past surgical history on file.  Social History:  Social History     Tobacco Use    Smoking status: Never Smoker    Smokeless tobacco: Never Used   Substance Use Topics    Alcohol use: No    Drug use: No        CURRENT MEDICATIONS:  Outpatient Medications Marked as Taking for the 3/25/21 encounter [facility] HOSPITAL Encounter)   Medication Sig Dispense Refill    linezolid (ZYVOX) 600 MG tablet Take 1 tablet by mouth 2 times daily for 14 days 28 tablet 0    hydrOXYzine (ATARAX) 25 MG tablet Take 25 mg by mouth nightly      pantoprazole (PROTONIX) 40 MG tablet TAKE 1 TABLET BY MOUTH EVERY DAY BEFORE BREAKFAST 90 tablet 0    lisinopril (PRINIVIL;ZESTRIL) 10 MG tablet Take 1 tablet by mouth 2 times daily 180 tablet 1    Cholecalciferol (VITAMIN D3) 125 MCG (5000 UT) TABS Take 5,000 Units by mouth daily      Cyanocobalamin (VITAMIN B 12) 500 MCG TABS Take 1 tablet by mouth 2 times daily          metoprolol tartrate (LOPRESSOR) tablet 25 mg, BID  lisinopril (PRINIVIL;ZESTRIL) tablet 5 mg, BID  sodium chloride tablet 2 g, TID WC  azithromycin (ZITHROMAX) 500 mg in sodium chloride 0.9 % 250 mL IVPB, Q24H  0.9 % sodium chloride infusion, PRN  Vitamin D (CHOLECALCIFEROL) tablet 5,000 Units, Daily  vitamin B-12 (CYANOCOBALAMIN) tablet 500 mcg, BID  hydrOXYzine (ATARAX) tablet 25 mg, Nightly  pantoprazole (PROTONIX) tablet 40 mg, QAM AC  cefTRIAXone (ROCEPHIN) 1,000 mg in sterile water 10 mL IV syringe, Q24H  sodium chloride flush 0.9 % injection 10 mL, 2 times per day  sodium chloride flush 0.9 % injection 10 mL, PRN  promethazine (PHENERGAN) tablet 12.5 mg, Q6H PRN    Or  ondansetron (ZOFRAN) injection 4 mg, Q6H PRN  polyethylene glycol (GLYCOLAX) packet 17 g, Daily PRN  acetaminophen (TYLENOL) tablet 650 mg, Q6H PRN    Or  acetaminophen (TYLENOL) suppository 650 mg, Q6H PRN  potassium chloride (KLOR-CON M) extended release tablet 40 mEq, Once  albuterol (PROVENTIL) nebulizer solution 2.5 mg, TID         FAMILY HISTORY: family history includes Cancer in her father and paternal grandfather; Coronary Art Dis in her maternal cousin. PHYSICAL EXAM:   BP (!) 137/48   Pulse 75   Temp 97.8 °F (36.6 °C) (Temporal)   Resp 17   Ht 5' 5\" (1.651 m)   Wt 105 lb 13.1 oz (48 kg)   SpO2 95%   BMI 17.61 kg/m²  Body mass index is 17.61 kg/m². Constitutional: She is oriented to person, place, and time. She appears well-developed and well-nourished. In no acute distress. HEENT: Normocephalic and atraumatic. No JVD present. Carotid bruit is not present. No mass and no thyromegaly present. No lymphadenopathy present. Cardiovascular: Tachycardic rate, regular rhythm, normal heart sounds. Exam reveals no gallop and no friction rubs. No murmur was heard. .  Pulmonary/Chest: Effort normal and breath sounds normal. No respiratory distress. She has no wheezes, rhonchi or rales. Abdominal: Soft, non-tender. Bowel sounds and aorta are normal. She exhibits no organomegaly, mass or bruit. Extremities: None. No cyanosis or clubbing. 2+ radial and carotid pulses. Distal extremity pulses: 2+ bilaterally. .  Neurological: She is alert and oriented to person, place, and time. No evidence of gross cranial nerve deficit. Coordination appeared normal.   Skin: Skin is warm and dry. There is no rash or diaphoresis. Psychiatric: She has a normal mood and affect.  Her speech is normal and behavior is normal.      MOST RECENT LABS ON RECORD:   Lab Results   Component Value Date    WBC 8.7 03/29/2021    HGB 8.1 (L) 03/29/2021    HCT 26.5 (L) 03/29/2021    PLT See Reflexed IPF Result 03/29/2021    CHOL 64 10/16/2020    TRIG 47 10/16/2020    HDL 28 (L) 10/16/2020    LDLCHOLESTEROL 27 10/16/2020    ALT 8 03/26/2021    AST 13 03/26/2021     (L) 03/29/2021    K 4.2 03/29/2021    CL 94 (L) 03/29/2021    CREATININE 0.62 03/29/2021    BUN 8 03/29/2021    CO2 22 03/29/2021    TSH 2.24 03/28/2021    INR 1.3 03/25/2021    LABA1C 6.2 (H) 10/16/2020        ASSESSMENT:  Patient Active Problem List    Diagnosis Date Noted    Pneumonia due to organism 03/26/2021    Hypokalemia 03/06/2021    H/O: CVA (cerebrovascular accident) 03/06/2021    Severe malnutrition ([address] 03/06/2021    Renal cell carcinoma ([facility] [address] 03/05/2021    Acute cystitis without hematuria 03/05/2021    Dehydration 03/05/2021    Anemia due to chemotherapy 03/05/2021    Hyponatremia 02/05/2021    Renal cell carcinoma of left kidney ([address] 02/04/2021    Anemia of chronic renal failure, stage 3 (moderate) 11/25/2020    Microcytic anemia 10/31/2020    Elevated ferritin 10/31/2020    thrombosis, evidenced by symptoms of acute myocardial ischemia which has manifested as the development of heart failure and shortness of breath at rest.  Because of this history, I ordered a echocardiogram to better assess the severity of this problem. · Severe Anemia: Last CBC was done today and her Hemoglobin was up to 8.1 g/dL from 6.6 with 1 unit of pRBC  · Continue supportive care. Once again, thank you for allowing me to participate in this patients care. Please do not hesitate to contact me if I could be of any further assistance. Sincerely,  Tami Monroy. Justin DANIELS, MS, F.A.C.C. [facility] Cardiology Specialist    [address] [place] [address], [address] [facility]  Phone: [phone], Fax: [phone]     I believe that the risk of significant morbidity and mortality related to the patient's current medical conditions are: Intermediate. The documentation recorded by the scribe, accurately and completely reflects the services I personally performed and the decisions made by me. Prema Deluna MD, MS, F.A.C.C.  March 29, 2021

## 2021-03-29 NOTE — PROGRESS NOTES
Renal Progress Note  [facility]      TELEHEALTH EVALUATION -- Audio/Visual (During DGHPZ-25 public health emergency)     Telehealth service was provided with the patient at her room in [address] [place] [address] and myself the physician in my office in [place], [place] and my the patient's RN, Nadege Desouza, who has initiated the visit. Pursuant to the emergency declaration under the [facility] [place] [address] [facility] authority and the [employer] Act, this Virtual  Visit was conducted, with patient's consent, to reduce the patient's risk of exposure to COVID-19 and provide continuity of care for an established patient. Services were provided through a video synchronous discussion virtually to substitute for in-person clinic visit. Patient :  Anika Dominguez; 79 y.o. MRN# [id number]  Location:  J626/M775-46  Attending:  Dino Shah MD  Admit Date:  3/25/2021   Hospital Day: 3      Subjective:     Nephrology is following the patient for hyponatremia. Patient was evaluated via video visit.  at bedside. Pt is a poor historian. She is very weak. Poor appetite. She is on room air. Denies SOB. +cough. Chart reviewed.     Outpatient Medications:     Medications Prior to Admission: linezolid (ZYVOX) 600 MG tablet, Take 1 tablet by mouth 2 times daily for 14 days  hydrOXYzine (ATARAX) 25 MG tablet, Take 25 mg by mouth nightly  pantoprazole (PROTONIX) 40 MG tablet, TAKE 1 TABLET BY MOUTH EVERY DAY BEFORE BREAKFAST  lisinopril (PRINIVIL;ZESTRIL) 10 MG tablet, Take 1 tablet by mouth 2 times daily  Cholecalciferol (VITAMIN D3) 125 MCG (5000 UT) TABS, Take 5,000 Units by mouth daily  Cyanocobalamin (VITAMIN B 12) 500 MCG TABS, Take 1 tablet by mouth 2 times daily     Current Medications:     Scheduled Meds:    metoprolol tartrate  25 mg Oral BID    lisinopril  5 mg Oral BID    sodium chloride  2 g Oral TID WC    Vitamin D  5,000 Units Oral Daily    vitamin B-12  500 mcg Oral BID    hydrOXYzine  25 mg Oral Nightly    pantoprazole  40 mg Oral QAM AC    azithromycin  500 mg Intravenous Q24H    cefTRIAXone (ROCEPHIN) IV  1,000 mg Intravenous Q24H    sodium chloride flush  10 mL Intravenous 2 times per day    potassium chloride  40 mEq Oral Once    albuterol  2.5 mg Nebulization TID     Continuous Infusions:    sodium chloride       PRN Meds:  sodium chloride, sodium chloride flush, promethazine **OR** ondansetron, polyethylene glycol, acetaminophen **OR** acetaminophen    Input/Output:       I/O last 3 completed shifts: In: 2558.3 [I.V.:2558.3]  Out: - .      Patient Vitals for the past 96 hrs (Last 3 readings):   Weight   21 0115 105 lb 13.1 oz (48 kg)       Vital Signs:   Temperature:  Temp: 97.4 °F (36.3 °C)  TMax:   Temp (24hrs), Av °F (36.7 °C), Min:97.4 °F (36.3 °C), Max:98.7 °F (37.1 °C)    Respirations:  Resp: 27  Pulse:   Pulse: 74  BP:    BP: 134/60  BP Range: Systolic (57QMT), CBK:134 , Min:118 , VRD:602       Diastolic (80AZB), EFS:83, Min:52, Max:128      Physical Examination:     General -- awake, frail chronically ill   Oral Mucosa -- moist  Neck --  JVD - no  Extremities -- no edema,   CNS - awake and alert   Pschy - not agitated, mood and memory normal    Due to this being a TeleHealth encounter, evaluation of the following organ systems is limited: Vitals/EENT/Resp/CV/GI//MS/Neuro/Skin/Heme-Lymph-Imm.   Labs:       Recent Labs     21  0535 21  1910 21  0715 21  0520   WBC 5.4  --  7.9 8.7   RBC 2.70*  --  3.26* 3.28*   HGB 6.6* 7.8* 7.9* 8.1*   HCT 21.2* 25.1* 26.7* 26.5*   MCV 78.5*  --  81.9* 80.8*   MCH 24.4*  --  24.2* 24.7*   MCHC 31.1  --  29.6 30.6   RDW 18.6*  --  18.4* 18.7*   PLT See Reflexed IPF Result  --  102* See Reflexed IPF Result   MPV NOT REPORTED  --  9.9 NOT REPORTED      BMP:   Recent Labs     21  0535 21  0715 21  0715 03/28/21  1800 03/28/21  2355 03/29/21  0520   * 124*   < > 124* 123* 123*   K 3.8 4.6  --   --   --  4.2   CL 94* 94*  --   --   --  94*   CO2 24 21  --   --   --  22   BUN 6* 7*  --   --   --  8   CREATININE 0.53 0.50  --   --   --  0.62   GLUCOSE 107* 105*  --   --   --  116*   CALCIUM 7.8* 7.8*  --   --   --  8.1*    < > = values in this interval not displayed. Phosphorus:   No results for input(s): PHOS in the last 72 hours. Magnesium:  No results for input(s): MG in the last 72 hours. Albumin:  No results for input(s): LABALBU in the last 72 hours. BNP:    No results found for: BNP  VINAY:      Lab Results   Component Value Date    VINAY NEGATIVE 03/09/2018     SPEP:  Lab Results   Component Value Date    PROT 6.1 03/26/2021    PATH ELECTRONICALLY SIGNED.  Familia Terrazas M.D. 11/17/2020     UPEP:   No results found for: LABPE  C3:   No results found for: C3  C4:   No results found for: C4  MPO ANCA:   No results found for: MPO  PR3 ANCA:   No results found for: PR3  Anti-GBM:   No results found for: GBMABIGG  Hep BsAg:       No results found for: HEPBSAG  Hep C AB:        No results found for: HEPCAB    Urinalysis/Chemistries:      Lab Results   Component Value Date    NITRU NEGATIVE 03/25/2021    COLORU YELLOW 03/25/2021    PHUR 6.0 03/25/2021    WBCUA 0 TO 2 03/25/2021    RBCUA 0 TO 2 03/25/2021    MUCUS TRACE 03/25/2021    TRICHOMONAS NOT REPORTED 03/25/2021    YEAST NOT REPORTED 03/25/2021    BACTERIA TRACE 03/25/2021    SPECGRAV <1.005 03/25/2021    LEUKOCYTESUR NEGATIVE 03/25/2021    UROBILINOGEN Normal 03/25/2021    BILIRUBINUR NEGATIVE 03/25/2021    GLUCOSEU NEGATIVE 03/25/2021    KETUA NEGATIVE 03/25/2021    AMORPHOUS NOT REPORTED 03/25/2021     Urine Sodium:     Lab Results   Component Value Date    ANYI 128 03/28/2021     Urine Potassium:  No results found for: KUR  Urine Chloride:  No results found for: CLUR  Urine Osmolarity:   Lab Results   Component Value Date    OSMOU 459 03/28/2021 Urine Protein:   No components found for: TOTALPROTEIN, URINE   Urine Creatinine:   No results found for: LABCREA  Urine Eosinophils:  No components found for: UEOS        Impression and Plan:  1. Hyponatremia: urine studies suggestive of SIADH. There has been no improvement with normal saline or 3% saline. Will stop IV fluids. Start salt tabs 2 grams TID. Will see if pharmacy can change antibiotics to normal saline instead of D5W. Start protein supplement. Continue serial sodium checks q 6 hours. If there is no improvement we may need to give Tolvaptan  2. Hypokalemia, improved  3. Anemia s/p blood transfusion  4. Thrombocytopenia  5. Metastatic renal cell carcinoma  6. Severe hypoalbuminemia  7. Hypocalcemia which corrects for hypoalbuminemia  8. New onset Afib  5. Aspiration pneumonia        Please don't hesitate to call with any questions.   Electronically signed by Cathy Menjivar DO on 3/29/2021 at 11:19 AM

## 2021-03-29 NOTE — PROGRESS NOTES
ICU Progress Note    SUBJECTIVE/INTERVAL HISTORY:    Patient seen in follow up for atrial fibrillation with RVR and acute respiratory distress. Patient resting in bed alert and pleasant in no acute distress. Her  sitting at her bedside. She denies chest pain or palpitations. She denies shortness of breath at this time. Her heart rate continues around 120 up to 140. We will start metoprolol today. Oral intake continues to be minimal.  This is unchanged from home. OBJECTIVE:    Vitals:   Temp: 97.4 °F (36.3 °C)  Temp range:    Temp  Av °F (36.7 °C)  Min: 97.4 °F (36.3 °C)  Max: 98.7 °F (37.1 °C)    BP: 134/60  BP Range:      Systolic (81JYM), OLX:969 , Min:118 , YVB:323      Diastolic (34GKC), UOT:84, Min:52, Max:128    Pulse: 74  Pulse Range:    Pulse  Av  Min: 70  Max: 107    Resp: 27  Resp Range:   Resp  Av.7  Min: 17  Max: 27    SpO2: 94 % on supplemental O2  SpO2 range:   SpO2  Av.5 %  Min: 90 %  Max: 100 %    24HR INTAKE/OUTPUT:      Intake/Output Summary (Last 24 hours) at 3/29/2021 1044  Last data filed at 3/29/2021 0800  Gross per 24 hour   Intake 2678.26 ml   Output    Net 2678.26 ml       -----------------------------------------------------------------  Review of Systems:  Constitutional:negative  for fevers, and negative for chills.   Eyes: negative for visual disturbance   ENT: negative for sore throat, negative nasal congestion, and negative for earache  Respiratory: negative for shortness of breath, negative for cough, and negative for wheezing  Cardiovascular: negative for chest pain, negative for palpitations, and negative for syncope  Gastrointestinal: negative for abdominal pain, negative for nausea,negative for vomiting, negative for diarrhea, negative for constipation, and negative for hematochezia or melena  Genitourinary: negative for dysuria, negative for urinary urgency, negative for urinary frequency, and negative for hematuria  Skin: negative for skin rash, and negative for skin lesions  Neurological: negative for unilateral weakness, numbness or tingling. Exam:  GEN:  alert and oriented to person, place and time, frail, in no acute distress  EYES: No gross abnormalities. , PERRL and EOMI  NECK: normal, supple, no lymphadenopathy,  no carotid bruits  PULM: clear to auscultation bilaterally- no wheezes, rales or rhonchi, normal air movement, no respiratory distress  COR: no murmurs, no gallops, irregularly irregular, S1 normal and S2 normal  ABD:  soft, non-tender, non-distended, normal bowel sounds, no masses or organomegaly  EXT:   no cyanosis, clubbing or edema present    NEURO: follows commands, RICHEY, no deficits  SKIN:  no rashes or significant lesions      -----------------------------------------------------------------  Diagnostic Data:  Lab Results   Component Value Date    WBC 8.7 03/29/2021    HGB 8.1 (L) 03/29/2021    HCT 26.5 (L) 03/29/2021    PLT See Reflexed IPF Result 03/29/2021    CHOL 64 10/16/2020    TRIG 47 10/16/2020    HDL 28 (L) 10/16/2020    ALT 8 03/26/2021    AST 13 03/26/2021     (L) 03/29/2021    K 4.2 03/29/2021    CL 94 (L) 03/29/2021    CREATININE 0.62 03/29/2021    BUN 8 03/29/2021    CO2 22 03/29/2021    TSH 2.24 03/28/2021    INR 1.3 03/25/2021    LABA1C 6.2 (H) 10/16/2020       ASSESSMENT:    Principal Problem:    Pneumonia  Active Problems:    Essential hypertension    Anemia of chronic renal failure, stage 3 (moderate)    Hypokalemia    Severe malnutrition (HCC)    Hyponatremia  Resolved Problems:    * No resolved hospital problems.  *      Patient Active Problem List    Diagnosis Date Noted    Pneumonia 03/26/2021    Hypokalemia 03/06/2021    H/O: CVA (cerebrovascular accident) 03/06/2021    Severe malnutrition ([address] 03/06/2021    Renal cell carcinoma ([facility] 03/05/2021    Acute cystitis without hematuria 03/05/2021    Dehydration 03/05/2021    Anemia due to chemotherapy 03/05/2021    Hyponatremia 02/05/2021    Renal cell carcinoma of left kidney (HCC) 02/04/2021    Anemia of chronic renal failure, stage 3 (moderate) 11/25/2020    Microcytic anemia 10/31/2020    Elevated ferritin 10/31/2020    Cerebral vascular disease 07/18/2018    Neuropathy 07/18/2018    Acute ischemic right middle cerebral artery (MCA) stroke (HCC)     Cerebrovascular accident (CVA) due to thrombosis of precerebral artery ([address] 02/06/2018    Hypertensive urgency 02/06/2018    LUE weakness 02/06/2018    Essential hypertension 02/06/2018    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 *    · Acute respiratory distress  ? Resolved  ? Likely due to new onset atrial fibrillation  ? supplemental O2  ? Chest x-ray yesterdaysmall pleural effusions vascular congestion  ? Troponin and BNP elevated  · New onset atrial fibrillation with RVR  ? Start metoprolol 25 mg twice daily  ? Off diltiazem drip  ? Cardiology consult  · Pneumonia  ? Continue IV Rocephin/Zithromax  ? Appreciate speech therapy for swallow eval  ? Barium swallow shows no aspiration  ? Suction as needed  · Dehydration  ? IVF's  ? Monitor renal function / lytes  · Anemia   ? Hb dropped from 8.3 to 6.6  ? S/p 1 u PRBC transfusion yesterday - repeat Hb 7.9  ? H&H today 8.1, 26.5  ? Stool quaiacnegative  ? Continue vitamin B12  ? Daily CBC stable  · Generalized weakness  ? PT/OT  · Hypokalemia  ? Resolved with replacement  · Hyponatremia   ? 1800 mL fluid restriction  ?  Nephrology consult initiated  · Moderate malnutrition   · Dietary consult  · DVT prophylaxis: Lovenox  · High risk medications:  None  · Discharge plan: Pending    STEVE Jerry, NP-C 3/29/2021, 10:44 AM

## 2021-03-29 NOTE — PROGRESS NOTES
RESPIRATORY ASSESSMENT PROTOCOL                                                                                              Patient Name: Wing Garcia Room#: J105/S714-96 : 1940     Admitting diagnosis: Pneumonia [J18.9]       Medical History:   Past Medical History:   Diagnosis Date    B12 deficiency     CVA (cerebral vascular accident) ([address] 2018    left middle cerebral artery stenosis    CVD (cerebrovascular disease)     Hypertension        PATIENT ASSESSMENT    LABORATORY DATA  Hematology:   Lab Results   Component Value Date    WBC 8.7 2021    RBC 3.28 2021    HGB 8.1 2021    HCT 26.5 2021    PLT See Reflexed IPF Result 2021     Chemistry:    Lab Results   Component Value Date    PHART 7.488 2021    BNW7BNR 35.9 2021    PO2ART 190.8 2021    P6THYXKZ 99.4 2021    NWG8PAK 26.6 2021    PBEA 3.5 2021       VITALS  Pulse: 78   Resp: 17  BP: (!) 138/48  SpO2: 94 % O2 Device: None (Room air)  Temp: 97.8 °F (36.6 °C)    SKIN COLOR  [x] Normal  [] Pale  [] Dusky  [] Cyanotic    RESPIRATORY PATTERN  [x] Normal  [] Dyspnea  [] Cheyne-Shah  [] Kussmaul  [] Biots    AMBULATORY  [] Yes  [] No  [x] With Assistance      Patient Acuity 0 1 2 3 4 Score   Level of Concious (LOC) [x]  Alert & Oriented or Pt normal LOC []  Confused;follows directions []  Confused & uncooper-ative []  Obtunded []  Comatose 0   Respiratory Rate  (RR) [x]  Reg. rate & pattern. 12 - 20 bpm  []  Increased RR. Greater than 20 bpm   []  SOB w/ exertion or RR greater than 24 bpm []  Access- ory muscle use at rest. Abn.  resp. []  SOB at rest.   0   Bilateral Breath Sounds (BBS) [x]  Clear []  Diminish-ed bases  []  Diminish-ed t/o, or rales   []  Sporadic, scattered wheezes or rhonchi []  Persistentwheezes and, or absent BBS 0   Cough [x]  Strong, effective, & non-prod. []  Effective & prod.  Less than 25 ml (2 TBSP) over past 24 hrs []  Ineffective & non-prod to less than 25 ML over past 24 hrs []  Ineffective and, or greater than 25 ml sputum prod. past 24 hrs. []  Nonspon- taneous; Requires suctioning 0   Pulmonary History  (PULM HX) []  No smoking and no chronic pulmonaryhistory []  Former smoker. Quit over 12 mos. ago []  Current smoker or quit w/ in 12 mos []  Pulm. History and, or 20 pk/yr smoking hx [x]  Admitted w/ acute pulm. dx and, or has been admitted w/ pulm. dx 2 or more times over past 12 mos 4   Surgical History this Admit  (SURG HX) [x]  No surgery []  General surgery []  Lower abdominal []  Thoracic or upper abdominal   []  Thoracic w/ pulm. disease 0   Chest X-Ray (CXR)/CT Scan []  Clear or not applicable []  Not available []  Atelect- asis or pleural effusions [x]  Localized infiltrate or pulm. edema []  Con-solidated Infiltrates, bilateral, or in more than 1 lobe 3   Slow or Forced VC, FEV1 OR PEFR (PULM FXN)  [x]  80% or greater, or not indicated []  Pt. unable to perform []  FEV1 or PEFR or VC 51-79%. []  FEV1 or PEFR or VC  30-49%   []  FEV1 or PEFR or VC less than 30%   0   TOTAL ACUITY: 7       CARE PLAN    If Acuity Level is 2, 3, or 4 in any of the following:    [] BILATERAL BREATH SOUNDS (BBS)     [] PULMONARY HISTORY (PULM HX)  [] PULMONARY FUNCTION (PULM FX)    Goal: Improve respiratory functions in patients with airway disease and decrease WOB    [x] AEROSOL PROTOCOL    Total Acuity:   16-32  []  Secondary Assessment in 24 hrs Total Acuity:  9-15  []  Secondary Assessment in 24 hrs Total Acuity:  4-8  [x]  Secondary Assessment in 48 hrs Total Acuity:  0-3  []  Secondary Assessment in 72 hrs   HHN AEROSOL THERAPY with  [physician-ordered bronchodilator(s)] q 4 & Albuterol PRN q2 hrs. Breath-Actuated Neb if BBS Acuity = 4, and pt. can use MP. Notify physician if condition deteriorates. HHN AEROSOL THERAPY with  [physician-ordered bronchodilator(s)]  QID and Albuterol PRN q4 hrs.    Breath-Actuated Neb if BBS Acuity = 4, and pt. can use MP. Notify physician if condition deteriorates. MDI THERAPY with  2 actuations of [physician-ordered bronchodilator(s)] via spacer TID Albuterol and PRNq4 hrs. If unable to utilize MDI: HHN [physician-ordered bronchodilator(s)] TID and Albuterol PRN q4 hrs. Notify physician if condition deteriorates. MDI THERAPY with  [physician-ordered bronchodilator(s)] via spacer TID PRN. If unable to utilize MDI: HHN [physician-ordered bronchodilator(s)] TID PRN. Notify physician if condition deteriorates. If Acuity Level is 2, 3, or 4 in any of the following:    [] COUGH     [] SURGICAL HISTORY (SURG HX)  [] CHEST XRAY (CXR)    Goal: Improvement in sputum mobilization in patients with ineffective airway clearance. Reverse atelectasis. [] Bronchopulmonary Hygiene Protocol    Total Acuity:   16-32  []  Secondary Assessment in 24 hrs Total Acuity:  9-15  []  Secondary Assessment in 24 hrs Total Acuity:  4-8  []  Secondary Assessment in 48 hrs Total Acuity:  0-3  []  Secondary Assessment in 72 hrs   METANEB QID with [physician-ordered bronchodilator(s)] if CXR Acuity = 4; otherwise:  PD&P, PEP, or Vest QID & PRN  NT Sxn PRN for ineffective cough  METANEB QID with [physician-ordered bronchodilator(s)] if CXR Acuity = 4; otherwise:  PD&P, PEP, or Vest TID & PRN  NT Sxn PRN for ineffective cough  Instruct patient to self-perform IS q1hr WA   Directed Cough self-performed q1hr WA     If Acuity Level is 2 or above in the following:    [] PULMONARY HISTORY (PULM HX)    Goal: Assist patient in quitting smoking to slow or stop the progression of lung disease.     [] Smoking Cessation Protocol    SMOKING CESSATION EDUCATION provided according to policy CF_615: (dolly with an X)  ____Yes    ____ No     ____ NA    Smoking Cessation Booklet given:  ____Yes  ____No ____Patient Killian Master

## 2021-03-29 NOTE — PROGRESS NOTES
[facility]  OCCUPATIONAL THERAPY  No Visit Note    [x] ICU    [] Acute   Patient: Andrew Harvey  Room: \[facility]\""/[place] [address]      Andrew Harvey not seen on 3/29/2021 at 11:28 AM due to patient with RN an respiratory upon attempt.           Signature: Karime Castellano OTR/L

## 2021-03-29 NOTE — PLAN OF CARE
Problem: Falls - Risk of:  Goal: Will remain free from falls  Description: Will remain free from falls  3/29/2021 0941 by Wallene Hodgkins, RN  Outcome: Ongoing  3/28/2021 2149 by Mila Brito RN  Outcome: Ongoing  Goal: Absence of physical injury  Description: Absence of physical injury  3/29/2021 0941 by Wallene Hodgkins, RN  Outcome: Ongoing  3/28/2021 2149 by Mila Brito RN  Outcome: Ongoing     Problem: Skin Integrity:  Goal: Will show no infection signs and symptoms  Description: Will show no infection signs and symptoms  3/29/2021 0941 by Wallene Hodgkins, RN  Outcome: Ongoing  3/28/2021 2149 by Mila Brito RN  Outcome: Ongoing  Goal: Absence of new skin breakdown  Description: Absence of new skin breakdown  3/29/2021 0941 by Wallene Hodgkins, RN  Outcome: Ongoing  3/28/2021 2149 by Mila Brito RN  Outcome: Ongoing     Problem: Infection:  Goal: Will remain free from infection  Description: Will remain free from infection  3/29/2021 0941 by Wallene Hodgkins, RN  Outcome: Ongoing  3/28/2021 2149 by Mila Brito RN  Note: Currently on ATB therapy for respiratory infection.      Problem: Safety:  Goal: Free from accidental physical injury  Description: Free from accidental physical injury  3/29/2021 0941 by Wallene Hodgkins, RN  Outcome: Ongoing  3/28/2021 2149 by Mila Brito RN  Outcome: Ongoing  Goal: Free from intentional harm  Description: Free from intentional harm  3/29/2021 0941 by Wallene Hodgkins, RN  Outcome: Ongoing  3/28/2021 2149 by Mila Brito RN  Outcome: Ongoing     Problem: Daily Care:  Goal: Daily care needs are met  Description: Daily care needs are met  3/29/2021 0941 by Wallene Hodgkins, RN  Outcome: Ongoing  3/28/2021 2149 by Mila Brito RN  Outcome: Ongoing     Problem: Pain:  Goal: Patient's pain/discomfort is manageable  Description: Patient's pain/discomfort is manageable  3/29/2021 0941 by Wallene Hodgkins, RN  Outcome: Ongoing  3/28/2021 2149 by Maia Solorzano RN  Note: Denies any pain at this time. Problem: Skin Integrity:  Goal: Skin integrity will stabilize  Description: Skin integrity will stabilize  3/29/2021 0941 by Maryann Gustafson RN  Outcome: Ongoing  3/28/2021 2149 by Maia Solorzano RN  Outcome: Ongoing     Problem: Discharge Planning:  Goal: Patients continuum of care needs are met  Description: Patients continuum of care needs are met  3/29/2021 0941 by Maryann Gustafson RN  Outcome: Ongoing  3/28/2021 2149 by Maia Solorzano RN  Outcome: Ongoing     Problem: Discharge Planning:  Goal: Discharged to appropriate level of care  Description: Discharged to appropriate level of care  3/29/2021 0941 by Maryann Gustafson RN  Outcome: Ongoing  3/28/2021 2149 by Maia Solorzano RN  Outcome: Ongoing  Goal: Participates in care planning  Description: Participates in care planning  3/29/2021 0941 by Maryann Gustafson RN  Outcome: Ongoing  3/28/2021 2149 by Maia Solorzano RN  Outcome: Ongoing     Problem: Airway Clearance - Ineffective:  Goal: Clear lung sounds  Description: Clear lung sounds  3/29/2021 0941 by Maryann Gustafson RN  Outcome: Ongoing  3/28/2021 2149 by Maia Solorzano RN  Note: Breath sounds clear.  No cough  Goal: Ability to maintain a clear airway will improve  Description: Ability to maintain a clear airway will improve  3/29/2021 0941 by Maryann Gustafson RN  Outcome: Ongoing  3/28/2021 2149 by Maia Solorzano RN  Outcome: Ongoing     Problem: Fluid Volume - Deficit:  Goal: Achieves intake and output within specified parameters  Description: Achieves intake and output within specified parameters  3/29/2021 0941 by Maryann Gustafson RN  Outcome: Ongoing  3/28/2021 2149 by Maia Solorzano RN  Note: MIV 0.9% NS at 40ml/hr     Problem: Gas Exchange - Impaired:  Goal: Levels of oxygenation will improve  Description: Levels of oxygenation will improve  3/29/2021 0941 by Maryann Gustafson RN  Outcome: Ongoing  3/28/2021 2149 by Alejandra Martinez RN  Note: )2 sat 92% on room air.      Problem: Hyperthermia:  Goal: Ability to maintain a body temperature in the normal range will improve  Description: Ability to maintain a body temperature in the normal range will improve  3/29/2021 0941 by Son Culver RN  Outcome: Ongoing  3/28/2021 2149 by Alejandra Martinez RN  Note: Afebrile 98.2     Problem: Tobacco Use:  Goal: Will participate in inpatient tobacco-use cessation counseling  Description: Will participate in inpatient tobacco-use cessation counseling  3/29/2021 0941 by Son Culver RN  Outcome: Ongoing  3/28/2021 2149 by Alejandra Martinez RN  Outcome: Not Met This Shift     Problem: Nutrition  Goal: Optimal nutrition therapy  3/29/2021 0941 by Son Culver RN  Outcome: Ongoing  3/28/2021 2149 by Alejandra Martinez RN  Outcome: Ongoing     Problem: Musculor/Skeletal Functional Status  Goal: Highest potential functional level  3/29/2021 0941 by Son Culver RN  Outcome: Ongoing  3/28/2021 2149 by Alejandra Martinez RN  Outcome: Ongoing  Goal: Absence of falls  3/29/2021 0941 by Son Culver RN  Outcome: Ongoing  3/28/2021 2149 by Alejandra Martinez RN  Outcome: Ongoing

## 2021-03-29 NOTE — PROGRESS NOTES
During medication administration, patients  states he does not want her to have the  Vit D pills because she has a hard time swallowing them. He offered to bring in her medication from home.

## 2021-03-29 NOTE — PROGRESS NOTES
PT is alert and oriented x3. PT's  is visiting. Skin color is pale, skin turgor is sluggish. Skin is warm and dry. Respirations are unlabored, breath sounds are clear. Abdomin is soft, non tender. PT appears malnourished in that she is very thin. PT's  states that about all she will eat is endure beverages. PT is in a SR rates in the 80's. Denies any wants and needs at this time.

## 2021-03-29 NOTE — PROGRESS NOTES
Balance  Sitting - Dynamic: Fair;-  Standing - Static: Poor  Standing - Dynamic: Poor  Comments: Pt requires moderate A to maintain sitting EOB due to post lean and weakness     Exercises  Comments: sit to stand x 2; sitting EOB     AROM RLE (degrees)  RLE AROM: WFL  AROM LLE (degrees)  LLE AROM : WFL     Strength RLE  Comment: grossly 3 to 3+/5  Strength LLE  Comment: grossly 3 to 3+/5       AM-PAC Score  AM-PAC Inpatient Mobility without Stair Climbing Raw Score : 10 (03/27/21 0742)  AM-PAC Inpatient without Stair Climbing T-Scale Score : 34.07 (03/27/21 0742)  Mobility Inpatient CMS 0-100% Score: 71.66 (03/27/21 0742)  Mobility Inpatient without Stair CMS G-Code Modifier : CL (03/27/21 9692)       Goals  Short term goals  Time Frame for Short term goals: 14 days  Short term goal 1: Pt to ambulate 30ft HHA with no LOB. Short term goal 2: Pt to tolerate 20-30mins ther ex/act for improved strength and endurance with ADL's. Short term goal 3: Pt to demonstrate good sitting balance. Short term goal 4: Pt to perform bed mob and transfers with min A +1. Patient Goals   Patient goals : none stated    Plan    Plan  Times per week: 7 x week  Times per day: Twice a day(daily on weekends)  Current Treatment Recommendations: Strengthening, Neuromuscular Re-education, Home Exercise Program, Safety Education & Training, Balance Training, Endurance Training, Patient/Caregiver Education & Training, Functional Mobility Training, Transfer Training, Gait Training, Stair training  Safety Devices  Type of devices:  All fall risk precautions in place     Therapy Time   Individual Concurrent Group Co-treatment   Time In 1404         Time Out 1428         Minutes 24         Timed Code Treatment Minutes: Zeeshan Alcala, PT, DPT, CMPT

## 2021-03-29 NOTE — PLAN OF CARE
Problem: Falls - Risk of:  Goal: Will remain free from falls  Description: Will remain free from falls  3/29/2021 0943 by Hiram Peters RN  Outcome: Ongoing  3/29/2021 0941 by Hiram Peters RN  Outcome: Ongoing  3/28/2021 2149 by Yeni Montes RN  Outcome: Ongoing  Goal: Absence of physical injury  Description: Absence of physical injury  3/29/2021 0943 by Hiram Peters RN  Outcome: Ongoing  3/29/2021 0941 by Hiram Peters RN  Outcome: Ongoing  3/28/2021 2149 by Yeni Montes RN  Outcome: Ongoing     Problem: Skin Integrity:  Goal: Will show no infection signs and symptoms  Description: Will show no infection signs and symptoms  3/29/2021 0943 by Hiram Peters RN  Outcome: Ongoing  3/29/2021 0941 by Hiram Peters RN  Outcome: Ongoing  3/28/2021 2149 by Yeni Montes RN  Outcome: Ongoing  Goal: Absence of new skin breakdown  Description: Absence of new skin breakdown  3/29/2021 0943 by Hiram Peters RN  Outcome: Ongoing  3/29/2021 0941 by Hiram Peters RN  Outcome: Ongoing  3/28/2021 2149 by Yeni Montes RN  Outcome: Ongoing     Problem: Infection:  Goal: Will remain free from infection  Description: Will remain free from infection  3/29/2021 0943 by Hiram Peters RN  Outcome: Ongoing  3/29/2021 0941 by Hiram Peters RN  Outcome: Ongoing  3/28/2021 2149 by Yeni Montes RN  Note: Currently on ATB therapy for respiratory infection.      Problem: Safety:  Goal: Free from accidental physical injury  Description: Free from accidental physical injury  3/29/2021 0943 by Hiram Peters RN  Outcome: Ongoing  3/29/2021 0941 by Hiram Peters RN  Outcome: Ongoing  3/28/2021 2149 by Yeni Montes RN  Outcome: Ongoing  Goal: Free from intentional harm  Description: Free from intentional harm  3/29/2021 0943 by Hiram Peters RN  Outcome: Ongoing  3/29/2021 0941 by Hiram Peters RN  Outcome: Ongoing  3/28/2021 2149 by Yeni Montes RN  Outcome: Ongoing     Problem: Daily Care:  Goal: Daily care needs are met  Description: Daily care needs are met  3/29/2021 0943 by Adriel Lee RN  Outcome: Ongoing  3/29/2021 0941 by Adriel Lee RN  Outcome: Ongoing  3/28/2021 2149 by Nevin Hawthorne RN  Outcome: Ongoing     Problem: Pain:  Goal: Patient's pain/discomfort is manageable  Description: Patient's pain/discomfort is manageable  3/29/2021 0943 by Adriel Lee RN  Outcome: Ongoing  3/29/2021 0941 by Adriel Lee RN  Outcome: Ongoing  3/28/2021 2149 by Nevin Hawthorne RN  Note: Denies any pain at this time.      Problem: Skin Integrity:  Goal: Skin integrity will stabilize  Description: Skin integrity will stabilize  3/29/2021 0943 by Adriel Lee RN  Outcome: Ongoing  3/29/2021 0941 by Adriel Lee RN  Outcome: Ongoing  3/28/2021 2149 by Nevin Hawthorne RN  Outcome: Ongoing     Problem: Discharge Planning:  Goal: Patients continuum of care needs are met  Description: Patients continuum of care needs are met  3/29/2021 0943 by Adriel Lee RN  Outcome: Ongoing  3/29/2021 0941 by Adriel Lee RN  Outcome: Ongoing  3/28/2021 2149 by Nevin Hawthorne RN  Outcome: Ongoing     Problem: Discharge Planning:  Goal: Discharged to appropriate level of care  Description: Discharged to appropriate level of care  3/29/2021 0943 by Adriel Lee RN  Outcome: Ongoing  3/29/2021 0941 by Adriel Lee RN  Outcome: Ongoing  3/28/2021 2149 by Nevin Hawthorne RN  Outcome: Ongoing  Goal: Participates in care planning  Description: Participates in care planning  3/29/2021 0943 by Adriel Lee RN  Outcome: Ongoing  3/29/2021 0941 by Adriel Lee RN  Outcome: Ongoing  3/28/2021 2149 by Nevin Hawthorne RN  Outcome: Ongoing     Problem: Airway Clearance - Ineffective:  Goal: Clear lung sounds  Description: Clear lung sounds  3/29/2021 0943 by Adriel Lee RN  Outcome: Ongoing  3/29/2021 0941 by Adriel Lee RN  Outcome: Ongoing  3/28/2021 2149 by Kya Delgado LEXI Trejo  Note: Breath sounds clear. No cough  Goal: Ability to maintain a clear airway will improve  Description: Ability to maintain a clear airway will improve  3/29/2021 0943 by Monica oJya RN  Outcome: Ongoing  3/29/2021 0941 by Monica Joya, RN  Outcome: Ongoing  3/28/2021 2149 by Allyn Tony RN  Outcome: Ongoing     Problem: Fluid Volume - Deficit:  Goal: Achieves intake and output within specified parameters  Description: Achieves intake and output within specified parameters  3/29/2021 0943 by Monica Joya, RN  Outcome: Ongoing  3/29/2021 0941 by Monica Joya, RN  Outcome: Ongoing  3/28/2021 2149 by Allyn Tony RN  Note: MIV 0.9% NS at 40ml/hr     Problem: Gas Exchange - Impaired:  Goal: Levels of oxygenation will improve  Description: Levels of oxygenation will improve  3/29/2021 0943 by Monica Joya, RN  Outcome: Ongoing  3/29/2021 0941 by Monica Joya, RN  Outcome: Ongoing  3/28/2021 2149 by Allyn Tony RN  Note: )2 sat 92% on room air.      Problem: Hyperthermia:  Goal: Ability to maintain a body temperature in the normal range will improve  Description: Ability to maintain a body temperature in the normal range will improve  3/29/2021 0943 by Monica Joya RN  Outcome: Ongoing  3/29/2021 0941 by Monica Joya, RN  Outcome: Ongoing  3/28/2021 2149 by Allyn Tony RN  Note: Afebrile 98.2     Problem: Tobacco Use:  Goal: Will participate in inpatient tobacco-use cessation counseling  Description: Will participate in inpatient tobacco-use cessation counseling  3/29/2021 0943 by Monica Joya RN  Outcome: Ongoing  3/29/2021 0941 by Monica Joya, RN  Outcome: Ongoing  3/28/2021 2149 by Allyn Tony RN  Outcome: Not Met This Shift     Problem: Nutrition  Goal: Optimal nutrition therapy  3/29/2021 0943 by Monica Joya, RN  Outcome: Ongoing  3/29/2021 0941 by Monica Joya, RN  Outcome: Ongoing  3/28/2021 2149 by Allyn Tony RN  Outcome: Ongoing     Problem: Musculor/Skeletal Functional Status  Goal: Highest potential functional level  3/29/2021 0943 by Eugenio Hope RN  Outcome: Ongoing  3/29/2021 0941 by Eugenio Hope RN  Outcome: Ongoing  3/28/2021 2149 by Jhon Haque RN  Outcome: Ongoing  Goal: Absence of falls  3/29/2021 0943 by Eugenio Hope RN  Outcome: Ongoing  3/29/2021 0941 by Eugenio Hope RN  Outcome: Ongoing  3/28/2021 2149 by Jhon Haque RN  Outcome: Ongoing

## 2021-03-30 LAB
ANION GAP SERPL CALCULATED.3IONS-SCNC: 7 MMOL/L (ref 9–17)
BUN BLDV-MCNC: 10 MG/DL (ref 8–23)
BUN/CREAT BLD: 17 (ref 9–20)
CALCIUM SERPL-MCNC: 7.9 MG/DL (ref 8.6–10.4)
CHLORIDE BLD-SCNC: 91 MMOL/L (ref 98–107)
CO2: 22 MMOL/L (ref 20–31)
CREAT SERPL-MCNC: 0.6 MG/DL (ref 0.5–0.9)
CULTURE: NORMAL
CULTURE: NORMAL
GFR AFRICAN AMERICAN: >60 ML/MIN
GFR NON-AFRICAN AMERICAN: >60 ML/MIN
GFR SERPL CREATININE-BSD FRML MDRD: ABNORMAL ML/MIN/{1.73_M2}
GFR SERPL CREATININE-BSD FRML MDRD: ABNORMAL ML/MIN/{1.73_M2}
GLUCOSE BLD-MCNC: 102 MG/DL (ref 70–99)
Lab: NORMAL
Lab: NORMAL
POTASSIUM SERPL-SCNC: 4 MMOL/L (ref 3.7–5.3)
SODIUM BLD-SCNC: 120 MMOL/L (ref 135–144)
SODIUM BLD-SCNC: 123 MMOL/L (ref 135–144)
SODIUM BLD-SCNC: 125 MMOL/L (ref 135–144)
SODIUM BLD-SCNC: 127 MMOL/L (ref 135–144)
SPECIMEN DESCRIPTION: NORMAL
SPECIMEN DESCRIPTION: NORMAL
THYROXINE, FREE: 1.18 NG/DL (ref 0.93–1.7)

## 2021-03-30 PROCEDURE — 97535 SELF CARE MNGMENT TRAINING: CPT

## 2021-03-30 PROCEDURE — 97116 GAIT TRAINING THERAPY: CPT

## 2021-03-30 PROCEDURE — 1200000000 HC SEMI PRIVATE

## 2021-03-30 PROCEDURE — 36415 COLL VENOUS BLD VENIPUNCTURE: CPT

## 2021-03-30 PROCEDURE — 94640 AIRWAY INHALATION TREATMENT: CPT

## 2021-03-30 PROCEDURE — 80048 BASIC METABOLIC PNL TOTAL CA: CPT

## 2021-03-30 PROCEDURE — 99232 SBSQ HOSP IP/OBS MODERATE 35: CPT | Performed by: INTERNAL MEDICINE

## 2021-03-30 PROCEDURE — 6370000000 HC RX 637 (ALT 250 FOR IP): Performed by: INTERNAL MEDICINE

## 2021-03-30 PROCEDURE — 6370000000 HC RX 637 (ALT 250 FOR IP): Performed by: NURSE PRACTITIONER

## 2021-03-30 PROCEDURE — 84295 ASSAY OF SERUM SODIUM: CPT

## 2021-03-30 PROCEDURE — 94761 N-INVAS EAR/PLS OXIMETRY MLT: CPT

## 2021-03-30 PROCEDURE — 6360000002 HC RX W HCPCS: Performed by: NURSE PRACTITIONER

## 2021-03-30 PROCEDURE — 97530 THERAPEUTIC ACTIVITIES: CPT

## 2021-03-30 PROCEDURE — 2580000003 HC RX 258: Performed by: NURSE PRACTITIONER

## 2021-03-30 RX ORDER — CHOLECALCIFEROL (VITAMIN D3) 125 MCG
5000 CAPSULE ORAL DAILY
Status: DISCONTINUED | OUTPATIENT
Start: 2021-03-31 | End: 2021-04-02 | Stop reason: HOSPADM

## 2021-03-30 RX ORDER — AMLODIPINE BESYLATE 2.5 MG/1
2.5 TABLET ORAL DAILY
Status: DISCONTINUED | OUTPATIENT
Start: 2021-03-30 | End: 2021-04-02 | Stop reason: HOSPADM

## 2021-03-30 RX ORDER — TOLVAPTAN 15 MG/1
15 TABLET ORAL ONCE
Status: COMPLETED | OUTPATIENT
Start: 2021-03-30 | End: 2021-03-30

## 2021-03-30 RX ADMIN — SODIUM CHLORIDE, PRESERVATIVE FREE 10 ML: 5 INJECTION INTRAVENOUS at 20:36

## 2021-03-30 RX ADMIN — ALBUTEROL SULFATE 2.5 MG: 2.5 SOLUTION RESPIRATORY (INHALATION) at 16:10

## 2021-03-30 RX ADMIN — ALBUTEROL SULFATE 2.5 MG: 2.5 SOLUTION RESPIRATORY (INHALATION) at 09:20

## 2021-03-30 RX ADMIN — TOLVAPTAN 15 MG: 15 TABLET ORAL at 09:31

## 2021-03-30 RX ADMIN — METOPROLOL TARTRATE 25 MG: 25 TABLET, FILM COATED ORAL at 09:31

## 2021-03-30 RX ADMIN — AMLODIPINE BESYLATE 2.5 MG: 2.5 TABLET ORAL at 09:30

## 2021-03-30 RX ADMIN — SODIUM CHLORIDE TAB 1 GM 2 G: 1 TAB at 17:03

## 2021-03-30 RX ADMIN — SODIUM CHLORIDE TAB 1 GM 2 G: 1 TAB at 12:19

## 2021-03-30 RX ADMIN — SODIUM CHLORIDE, PRESERVATIVE FREE 10 ML: 5 INJECTION INTRAVENOUS at 09:30

## 2021-03-30 RX ADMIN — CYANOCOBALAMIN TAB 1000 MCG 500 MCG: 1000 TAB at 17:03

## 2021-03-30 RX ADMIN — ALBUTEROL SULFATE 2.5 MG: 2.5 SOLUTION RESPIRATORY (INHALATION) at 19:35

## 2021-03-30 RX ADMIN — Medication 5000 UNITS: at 09:31

## 2021-03-30 RX ADMIN — PANTOPRAZOLE SODIUM 40 MG: 40 TABLET, DELAYED RELEASE ORAL at 07:27

## 2021-03-30 RX ADMIN — SODIUM CHLORIDE TAB 1 GM 2 G: 1 TAB at 09:38

## 2021-03-30 RX ADMIN — CYANOCOBALAMIN TAB 1000 MCG 500 MCG: 1000 TAB at 09:37

## 2021-03-30 ASSESSMENT — PAIN SCALES - GENERAL
PAINLEVEL_OUTOF10: 0
PAINLEVEL_OUTOF10: 0

## 2021-03-30 NOTE — PROGRESS NOTES
Patient had bowel movement in brief at this time. Medium, soft, brown. Patient cleaned and brooklynn-care given, as well as higgins care. New brief applied. Patient moved up in bed. Vital signs and assessment completed. Family at bedside. Patient denies any needs at this time. Patient denies any pain. Patient seems very withdrawn with periodic confusion however was able to answer orientation questions correctly. Call light, bedside table and personal belongings within reach. Bed alarm on. Will continue to monitor.

## 2021-03-30 NOTE — PLAN OF CARE
Problem: Falls - Risk of:  Goal: Will remain free from falls  3/30/2021 0753 by Jerry Bunch RN  Outcome: Ongoing  3/29/2021 2257 by Angeles Brizuela RN  Outcome: Ongoing  Goal: Absence of physical injury  3/30/2021 0753 by Jerry Bunch RN  Outcome: Ongoing  3/29/2021 2257 by Angeles Brizuela RN  Outcome: Ongoing     Problem: Skin Integrity:  Goal: Will show no infection signs and symptoms  3/30/2021 0753 by Jerry Bunch RN  Outcome: Ongoing  3/29/2021 2257 by Angeles Brizuela RN  Outcome: Ongoing  Goal: Absence of new skin breakdown  3/30/2021 0753 by Jerry Bunch RN  Outcome: Ongoing  3/29/2021 2257 by Angeles Brizuela RN  Outcome: Ongoing     Problem: Infection:  Goal: Will remain free from infection  3/30/2021 0753 by Jerry Bunch RN  Outcome: Ongoing  3/29/2021 2257 by Angeles Brizuela RN  Outcome: Ongoing     Problem: Safety:  Goal: Free from accidental physical injury  3/30/2021 0753 by Jerry Bunch RN  Outcome: Ongoing  3/29/2021 2257 by Angeles Brizuela RN  Outcome: Ongoing  Goal: Free from intentional harm  3/30/2021 0753 by Jerry Bunch RN  Outcome: Ongoing  3/29/2021 2257 by Angeles Brizuela RN  Outcome: Ongoing     Problem: Daily Care:  Goal: Daily care needs are met  3/30/2021 0753 by Jerry Bunch RN  Outcome: Ongoing  3/29/2021 2257 by Angeles Brizuela RN  Outcome: Ongoing     Problem: Pain:  Goal: Patient's pain/discomfort is manageable  3/30/2021 0753 by Jerry Bunch RN  Outcome: Ongoing  3/29/2021 2257 by Angeles Brizuela RN  Outcome: Ongoing     Problem: Skin Integrity:  Goal: Skin integrity will stabilize  3/30/2021 0753 by Jerry Bunch RN  Outcome: Ongoing  3/29/2021 2257 by Angeles Brizuela RN  Outcome: Ongoing     Problem: Discharge Planning:  Goal: Patients continuum of care needs are met  3/30/2021 0753 by Jerry Bunch RN  Outcome: Ongoing  3/29/2021 2257 by Angeles Brizuela RN  Outcome: Ongoing     Problem: Discharge Planning:  Goal: Discharged to appropriate level of care  3/30/2021 0753 by Magdaline Aschoff, RN  Outcome: Ongoing  3/29/2021 2257 by Day Jasmine RN  Outcome: Ongoing  Goal: Participates in care planning  3/30/2021 0753 by Magdaline Aschoff, RN  Outcome: Ongoing  3/29/2021 2257 by Day Jasmine RN  Outcome: Ongoing     Problem: Airway Clearance - Ineffective:  Goal: Clear lung sounds  3/30/2021 0753 by Magdaline Aschoff, RN  Outcome: Ongoing  3/29/2021 2257 by Day Jasmine RN  Outcome: Ongoing  Goal: Ability to maintain a clear airway will improve  3/30/2021 0753 by Magdaline Aschoff, RN  Outcome: Ongoing  3/29/2021 2257 by Day Jasmine RN  Outcome: Ongoing     Problem: Fluid Volume - Deficit:  Goal: Achieves intake and output within specified parameters  3/30/2021 0753 by Magdaline Aschoff, RN  Outcome: Ongoing  3/29/2021 2257 by Day Jasmine RN  Outcome: Ongoing     Problem: Gas Exchange - Impaired:  Goal: Levels of oxygenation will improve  3/30/2021 0753 by Magdaline Aschoff, RN  Outcome: Ongoing  3/29/2021 2257 by Day Jasmine RN  Outcome: Ongoing     Problem: Hyperthermia:  Goal: Ability to maintain a body temperature in the normal range will improve  3/30/2021 0753 by Magdaline Aschoff, RN  Outcome: Ongoing  3/29/2021 2257 by Day Jasmine RN  Outcome: Ongoing     Problem: Tobacco Use:  Goal: Will participate in inpatient tobacco-use cessation counseling  3/30/2021 0753 by Magdaline Aschoff, RN  Outcome: Ongoing  3/29/2021 2257 by Day Jasmine RN  Outcome: Ongoing     Problem: Nutrition  Goal: Optimal nutrition therapy  3/30/2021 0753 by Magdaline Aschoff, RN  Outcome: Ongoing  3/29/2021 2257 by Day Jasmine RN  Outcome: Ongoing     Problem: Musculor/Skeletal Functional Status  Goal: Highest potential functional level  3/30/2021 0753 by Magdaline Aschoff, RN  Outcome: Ongoing  3/29/2021 2257 by Day Jasmine RN  Outcome: Ongoing  Goal: Absence of falls  3/30/2021 0753 by Magdaline Aschoff, RN  Outcome: Ongoing  3/29/2021 2257 by Day Jasmine, LEXI  Outcome: Ongoing

## 2021-03-30 NOTE — PROGRESS NOTES
Occupational Therapy  Facility/Department: [facility] MED SURG  Daily Treatment Note  NAME: Nevaeh Catalan  : 1940  MRN: [id number]    Date of Service: 3/30/2021    Discharge Recommendations:  Continue to assess pending progress, Subacute/Skilled Nursing Facility, Home with Home health OT       Assessment      Safety Devices  Safety Devices in place: Yes  Type of devices: Call light within reach; Left in chair         Patient Diagnosis(es): The primary encounter diagnosis was Pneumonia due to organism. Diagnoses of Acute cystitis without hematuria, Generalized weakness, History of stroke, and Hyponatremia were also pertinent to this visit. has a past medical history of B12 deficiency, CVA (cerebral vascular accident) ([facility] [address] CVD (cerebrovascular disease), and Hypertension. has no past surgical history on file. Restrictions  Restrictions/Precautions  Restrictions/Precautions: General Precautions, Fall Risk  Subjective   General  Chart Reviewed: Yes  Patient assessed for rehabilitation services?: Yes  Response to previous treatment: Patient with no complaints from previous session  Family / Caregiver Present: Yes  Referring Practitioner: Barrett Bardales MD  Diagnosis: Pnemonia  Subjective  Subjective: Pt had no complaints of pain. Pt stated \"I'm just very tired\". General Comment  Comments: Pt lying in bed upon arrival. Pt agreed to sit up in bedside chair. Orientation     Objective                Bed mobility  Supine to Sit: 2 Person assistance;Maximum assistance  Scooting: Maximal assistance;2 Person assistance  Transfers  Stand Pivot Transfers: Minimal assistance; Moderate assistance;2 Person assistance(Stand pivot txfr Min/Mod A x 2 with heavy posterior lean from EOB to bedside chair.)                                                                 Plan   Plan  Times per week: 7  Times per day: Daily  Current Treatment Recommendations: Safety Education & Training, Patient/Caregiver Education & Training, Self-Care / ADL, Balance Training, Functional Mobility Training, Endurance Training, Strengthening, ROM  G-Code     OutComes Score                                                  AM-PAC Score             Goals  Short term goals  Time Frame for Short term goals: 20 visits  Short term goal 1: Patient will complete transfers with mod A to increase independence with functional mobility. Short term goal 2: Patient will complete bed mobility with min A to return to PLOF. Short term goal 3: Patient will tolerate 5 minute self-care activity sitting EOB with min A to increase independence with self-care.        Therapy Time   Individual Concurrent Group Co-treatment   Time In 0825         Time Out 0848         Minutes 23                 DAYTON Landry/MEL

## 2021-03-30 NOTE — PLAN OF CARE
Achieves intake and output within specified parameters  3/29/2021 2257 by Missy King RN  Outcome: Ongoing     Problem: Gas Exchange - Impaired:  Goal: Levels of oxygenation will improve  Description: Levels of oxygenation will improve  3/29/2021 2257 by Missy King RN  Outcome: Ongoing     Problem: Hyperthermia:  Goal: Ability to maintain a body temperature in the normal range will improve  Description: Ability to maintain a body temperature in the normal range will improve  3/29/2021 2257 by Missy King RN  Outcome: Ongoing     Problem: Tobacco Use:  Goal: Will participate in inpatient tobacco-use cessation counseling  Description: Will participate in inpatient tobacco-use cessation counseling  3/29/2021 2257 by Missy King RN  Outcome: Ongoing     Problem: Nutrition  Goal: Optimal nutrition therapy  3/29/2021 2257 by Missy King RN  Outcome: Ongoing     Problem: Musculor/Skeletal Functional Status  Goal: Highest potential functional level  3/29/2021 2257 by Missy King RN  Outcome: Ongoing     Problem: Musculor/Skeletal Functional Status  Goal: Absence of falls  3/29/2021 2257 by Missy King RN  Outcome: Ongoing

## 2021-03-30 NOTE — PROGRESS NOTES
deficits  SKIN:  no rashes or significant lesions      -----------------------------------------------------------------  Diagnostic Data:  Lab Results   Component Value Date    WBC 8.7 03/29/2021    HGB 8.1 (L) 03/29/2021    HCT 26.5 (L) 03/29/2021    PLT See Reflexed IPF Result 03/29/2021    CHOL 64 10/16/2020    TRIG 47 10/16/2020    HDL 28 (L) 10/16/2020    ALT 8 03/26/2021    AST 13 03/26/2021     (L) 03/30/2021    K 4.0 03/30/2021    CL 91 (L) 03/30/2021    CREATININE 0.60 03/30/2021    BUN 10 03/30/2021    CO2 22 03/30/2021    TSH 2.24 03/28/2021    INR 1.3 03/25/2021    LABA1C 6.2 (H) 10/16/2020       ASSESSMENT:    Principal Problem:    Pneumonia due to organism suspect aspiration  Active Problems:    Essential hypertension    Anemia of chronic renal failure, stage 3 (moderate)    Hypokalemia    Severe malnutrition (HCC)    Hyponatremia likely SIADH    PAF (paroxysmal atrial fibrillation) (HCC)    Type 2 MI (myocardial infarction) ([facility] [address]  Resolved Problems:    * No resolved hospital problems.  *      Patient Active Problem List    Diagnosis Date Noted    PAF (paroxysmal atrial fibrillation) (HCC)     [facility]     Pneumonia due to organism suspect aspiration 03/26/2021    Hypokalemia 03/06/2021    H/O: CVA (cerebrovascular accident) 03/06/2021    Severe malnutrition ([address] 03/06/2021    Renal cell carcinoma ([facility] [address] 03/05/2021    Acute cystitis without hematuria 03/05/2021    Dehydration 03/05/2021    Anemia due to chemotherapy 03/05/2021    Hyponatremia likely SIADH 02/05/2021    Renal cell carcinoma of left kidney ([address] 02/04/2021    Anemia of chronic renal failure, stage 3 (moderate) 11/25/2020    Microcytic anemia 10/31/2020    Elevated ferritin 10/31/2020    Cerebral vascular disease 07/18/2018    Neuropathy 07/18/2018    Acute ischemic right middle cerebral artery (MCA) stroke (HCC)     Cerebrovascular accident (CVA) due to thrombosis of precerebral

## 2021-03-30 NOTE — PROGRESS NOTES
Met with Pt's  Kathy Butler), PT was asleep for our visit. Moi Dinh is struggling with his wife's health condition, but is accepting of the hard decisions ahead. Moi Dinh was open to conversation and thankful for prayer.

## 2021-03-30 NOTE — PROGRESS NOTES
PALLIATIVE CARE  In to see if pt  has decided on a hospice agency to meet and talk with.  tells me that the nephrologist is adding some medication to help with her sodium. Also tells me that he is planning on talking with oncology tomorrow about possible treatment. Tells me that he wants to wait now before meeting with hospice. He has spoken with his daughter today. Not open to further discussion. Support given. Palliative Care to continue to follow and support as able. Katherine Cantu RN, [facility] and Dominic Palma Nurse Coordinator  3/30/2021 2:20 PM

## 2021-03-30 NOTE — PROGRESS NOTES
Comprehensive Nutrition Assessment    Type and Reason for Visit:  Reassess    Nutrition Recommendations/Plan:  Food and fluid for comfort if Hospice chosen    Nutrition Assessment:  Continued chronic moderate malnutrition r/t inadequate nutrient intakes, AEB mild to moderate fat and muscle losses and low PO intakes. Family at bedside and agreeable to continue use of Magic Cup. Still bringing in Atkins drinks from home. Noted potential Hospice c/s. On 1800 ml f/r with PO intakes far below, and 6 g oral sodium tabs, yet serum Na+ not improving. If hospice chosen, food and fluid for comfort recommended. Malnutrition Assessment:  Malnutrition Status: Moderate malnutrition    Context:  Chronic Illness     Findings of the 6 clinical characteristics of malnutrition:  Energy Intake:  7 - 75% or less estimated energy requirements for 1 month or longer  Weight Loss:  No significant weight loss     Body Fat Loss:  1 - Mild body fat loss Buccal region   Muscle Mass Loss:  1 - Mild muscle mass loss Temples (temporalis)  Fluid Accumulation:  No significant fluid accumulation     Strength:  Not Performed    Estimated Daily Nutrient Needs:  Energy (kcal):  4918-1378(29-87); Weight Used for Energy Requirements:  Current     Protein (g):  62-72(1.3-1.5); Weight Used for Protein Requirements:  Current        Fluid (ml/day):  1700; Method Used for Fluid Requirements:  1 ml/kcal      Nutrition Related Findings:  underweight and malnourished appearing      Wounds:  None       Current Nutrition Therapies:    Dietary Nutrition Supplements: Frozen Oral Supplement  DIET GENERAL; No Added Salt (3-4 GM);  Daily Fluid Restriction: 1800 ml    Anthropometric Measures:  · Height: 5' 5\" (165.1 cm)  · Current Body Weight: 105 lb 1.6 oz (47.7 kg)   · Admission Body Weight: 105 lb 13.1 oz (48 kg)    · Usual Body Weight: 112 lb (50.8 kg)     · Ideal Body Weight: 125 lbs; % Ideal Body Weight 84.7 %   · BMI: 17.5  · Adjusted Body Weight:  ; No Adjustment   · BMI Categories: Underweight (BMI less than 18.5)       Nutrition Diagnosis:   · Moderate malnutrition, In context of chronic illness related to inadequate protein-energy intake as evidenced by mild muscle loss, mild loss of subcutaneous fat, poor intake prior to admission    Lab Results   Component Value Date     (L) 03/30/2021    K 4.0 03/30/2021    CL 91 (L) 03/30/2021    CO2 22 03/30/2021    BUN 10 03/30/2021    CREATININE 0.60 03/30/2021    GLUCOSE 102 (H) 03/30/2021    CALCIUM 7.9 (L) 03/30/2021    PROT 6.1 (L) 03/26/2021    LABALBU 1.7 (L) 03/26/2021    BILITOT 0.64 03/26/2021    ALKPHOS 106 (H) 03/26/2021    AST 13 03/26/2021    ALT 8 03/26/2021    LABGLOM >60 03/30/2021    GFRAA >60 03/30/2021     Nutrition Interventions:   Food and/or Nutrient Delivery:  Continue Oral Nutrition Supplement, Continue Current Diet  Nutrition Education/Counseling:  No recommendation at this time   Coordination of Nutrition Care:  Continue to monitor while inpatient    Goals:  Food and fluid for comfort if hospice chosen       Nutrition Monitoring and Evaluation:   Behavioral-Environmental Outcomes:  Beliefs and Attitutes   Food/Nutrient Intake Outcomes:  Food and Nutrient Intake, Supplement Intake  Physical Signs/Symptoms Outcomes:  Biochemical Data, Weight     Discharge Planning:    No discharge needs at this time     Electronically signed by Jose Francisco Coats RD, LD on 3/30/21 at 12:19 PM EDT    Contact: [phone]

## 2021-03-30 NOTE — FLOWSHEET NOTE
Awake, resting in bed.  at bedside. Vitals checked and assessment completed. Denies pain at present time. Bed alarm on for safety.  Continue to monitor

## 2021-03-30 NOTE — PROGRESS NOTES
Physician Progress Note      Lisa Koo  CSN #:                  [id number]  :                       1940  ADMIT DATE:       3/25/2021 9:09 PM  [address] [place] DATE:  RESPONDING  PROVIDER #:        Ilda Mcwilliams MD          QUERY TEXT:    Patient admitted with pneumonia. Per 3/28/21 hospitalist progress note: Pneumonia-concern for aspiration  Per 3/29/21 Nephrology note: aspiration pneumonia. If possible, please document in the progress notes and discharge summary if   aspiration pneumonia was: The medical record reflects the following:  Risk Factors: hx stroke, increased fatigue and weakness  Clinical Indicators: pr H&P: she has not been able to eat solid foods due to   nausea and vomiting as she gags when she eats;    Per admission CXR: Bibasilar   opacity and left pleural effusion, concerning for multifocal pneumonia;    3/26/21 MBS: No evidence for aspiration  Treatment: IV Zithromax, IV Rocephin SLP eval and treat    Stan Hitchcock RN, [address]  Clinical   [phone]  .   Options provided:  -- Aspiration pneumonia, present on admission  -- Aspiration  Pneumonia  was not present on admission and developed during   the inpatient stay  -- Aspiration pneumonia ruled out after study  -- Other - I will add my own diagnosis  -- Disagree - Not applicable / Not valid  -- Disagree - Clinically unable to determine / Unknown  -- Refer to Clinical Documentation Reviewer    PROVIDER RESPONSE TEXT:    Aspiration pneumonia, present on admission    Query created by: Rosey Adkins on 3/30/2021 12:53 PM      Electronically signed by:  Ilda Mcwilliams MD 3/30/2021 2:00 PM

## 2021-03-30 NOTE — PROGRESS NOTES
Renal Progress Note  [facility]      TELEHEALTH EVALUATION -- Audio/Visual (During [id number] public health emergency)     Telehealth service was provided with the patient at her room in [address] and myself the physician in my office in [place], [place] and my the patient's RN, Anjum Malagon, who has initiated the visit. Pursuant to the emergency declaration under the [address] [place] [address] [facility] authority and the [employer] Act, this Virtual  Visit was conducted, with patient's consent, to reduce the patient's risk of exposure to COVID-19 and provide continuity of care for an established patient. Services were provided through a video synchronous discussion virtually to substitute for in-person clinic visit. Patient :  Reynaldo Hopson; 79 y.o. MRN# [id number]  Location:  0320/0320-01  Attending:  Steph Akhtar MD  Admit Date:  3/25/2021   Hospital Day: 4      Subjective:     Nephrology is following the patient for hyponatremia. Patient was evaluated via video visit. Son at bedside. Family is considering hospice. Patient is very weak. Not eating or drinking much. Having trouble with salt pills. Sodium was 120 this AM so I ordered dose of Tolvaptan.     Outpatient Medications:     Medications Prior to Admission: linezolid (ZYVOX) 600 MG tablet, Take 1 tablet by mouth 2 times daily for 14 days  hydrOXYzine (ATARAX) 25 MG tablet, Take 25 mg by mouth nightly  pantoprazole (PROTONIX) 40 MG tablet, TAKE 1 TABLET BY MOUTH EVERY DAY BEFORE BREAKFAST  lisinopril (PRINIVIL;ZESTRIL) 10 MG tablet, Take 1 tablet by mouth 2 times daily  Cholecalciferol (VITAMIN D3) 125 MCG (5000 UT) TABS, Take 5,000 Units by mouth daily  Cyanocobalamin (VITAMIN B 12) 500 MCG TABS, Take 1 tablet by mouth 2 times daily     Current Medications:     Scheduled Meds:    amLODIPine  2.5 mg Oral Daily    [START ON 3/31/2021] Vitamin D  5,000 Units Oral Daily    metoprolol tartrate  25 mg Oral BID    sodium chloride  2 g Oral TID WC    IVPB builder  500 mg Intravenous Q24H    vitamin B-12  500 mcg Oral BID    hydrOXYzine  25 mg Oral Nightly    pantoprazole  40 mg Oral QAM AC    cefTRIAXone (ROCEPHIN) IV  1,000 mg Intravenous Q24H    sodium chloride flush  10 mL Intravenous 2 times per day    potassium chloride  40 mEq Oral Once    albuterol  2.5 mg Nebulization TID     Continuous Infusions:    sodium chloride       PRN Meds:  sodium chloride, sodium chloride flush, promethazine **OR** ondansetron, polyethylene glycol, acetaminophen **OR** acetaminophen    Input/Output:       I/O last 3 completed shifts: In: 160 [P.O.:160]  Out: 725 [Urine:725]. Patient Vitals for the past 96 hrs (Last 3 readings):   Weight   21 0530 105 lb 1.6 oz (47.7 kg)       Vital Signs:   Temperature:  Temp: 98.7 °F (37.1 °C)  TMax:   Temp (24hrs), Av.4 °F (36.9 °C), Min:98.1 °F (36.7 °C), Max:98.7 °F (37.1 °C)    Respirations:  Resp: 16  Pulse:   Pulse: 68  BP:    BP: (!) 138/58  BP Range: Systolic (98WIV), CFB:793 , Min:138 , GUJ:677       Diastolic (88DWF), DIV:86, Min:50, Max:66      Physical Examination:     General -- awake, frail chronically ill   Oral Mucosa -- moist  Neck --  JVD - no  Extremities -- no edema,   CNS - awake and alert   Pschy - not agitated, mood and memory normal    Due to this being a TeleHealth encounter, evaluation of the following organ systems is limited: Vitals/EENT/Resp/CV/GI//MS/Neuro/Skin/Heme-Lymph-Imm.   Labs:       Recent Labs     21  1910 21  0715 21  0520   WBC  --  7.9 8.7   RBC  --  3.26* 3.28*   HGB 7.8* 7.9* 8.1*   HCT 25.1* 26.7* 26.5*   MCV  --  81.9* 80.8*   MCH  --  24.2* 24.7*   MCHC  --  29.6 30.6   RDW  --  18.4* 18.7*   PLT  --  102* See Reflexed IPF Result   MPV  --  9.9 NOT REPORTED      BMP:   Recent Labs     21  0715 21  0715 21  0520 21 3206 03/30/21  0000 03/30/21  0600 03/30/21  1140   *   < > 123*   < > 123* 120* 125*   K 4.6  --  4.2  --   --  4.0  --    CL 94*  --  94*  --   --  91*  --    CO2 21  --  22  --   --  22  --    BUN 7*  --  8  --   --  10  --    CREATININE 0.50  --  0.62  --   --  0.60  --    GLUCOSE 105*  --  116*  --   --  102*  --    CALCIUM 7.8*  --  8.1*  --   --  7.9*  --     < > = values in this interval not displayed. Phosphorus:   No results for input(s): PHOS in the last 72 hours. Magnesium:  No results for input(s): MG in the last 72 hours. Albumin:  No results for input(s): LABALBU in the last 72 hours. BNP:    No results found for: BNP  VINAY:      Lab Results   Component Value Date    VINAY NEGATIVE 03/09/2018     SPEP:  Lab Results   Component Value Date    PROT 6.1 03/26/2021    PATH ELECTRONICALLY SIGNED.  Steven Soto M.D. 11/17/2020     UPEP:   No results found for: LABPE  C3:   No results found for: C3  C4:   No results found for: C4  MPO ANCA:   No results found for: MPO  PR3 ANCA:   No results found for: PR3  Anti-GBM:   No results found for: GBMABIGG  Hep BsAg:       No results found for: HEPBSAG  Hep C AB:        No results found for: HEPCAB    Urinalysis/Chemistries:      Lab Results   Component Value Date    NITRU NEGATIVE 03/25/2021    COLORU YELLOW 03/25/2021    PHUR 6.0 03/25/2021    WBCUA 0 TO 2 03/25/2021    RBCUA 0 TO 2 03/25/2021    MUCUS TRACE 03/25/2021    TRICHOMONAS NOT REPORTED 03/25/2021    YEAST NOT REPORTED 03/25/2021    BACTERIA TRACE 03/25/2021    SPECGRAV <1.005 03/25/2021    LEUKOCYTESUR NEGATIVE 03/25/2021    UROBILINOGEN Normal 03/25/2021    BILIRUBINUR NEGATIVE 03/25/2021    GLUCOSEU NEGATIVE 03/25/2021    KETUA NEGATIVE 03/25/2021    AMORPHOUS NOT REPORTED 03/25/2021     Urine Sodium:     Lab Results   Component Value Date    ANYI 145 03/29/2021     Urine Potassium:  No results found for: KUR  Urine Chloride:  No results found for: CLUR  Urine Osmolarity:   Lab Results Component Value Date    SOHAM Mcdonough 03/29/2021     Urine Protein:   No components found for: TOTALPROTEIN, URINE   Urine Creatinine:   No results found for: LABCREA  Urine Eosinophils:  No components found for: UEOS        Impression and Plan:  1. Hyponatremia: urine studies suggestive of SIADH. Has been difficult to treat. Initially treated with normal saline with no improvement. currently on salt tabs 2 grams TID, 1800 fluid restriction (but she is hardly drinking anything). S/p tolvaptan 15 mg this AM.  Repeat sodium is improved up to 125 today. Will continue to monitor. If family decides to go hospice then cancel serial lytes but for now continue q 6 hour sodium checks. 2. Hypokalemia, improved  3. Anemia s/p blood transfusion  4. Thrombocytopenia  5. Metastatic renal cell carcinoma  6. Severe hypoalbuminemia  7. Hypocalcemia which corrects for hypoalbuminemia  8. New onset Afib  9. Aspiration pneumonia  10. Diastolic CHF    Overall prognosis is guarded. Please don't hesitate to call with any questions.   Electronically signed by Giorgio Wood DO on 3/30/2021 at 1:28 PM

## 2021-03-30 NOTE — PROGRESS NOTES
Vital signs and assessment completed. Medina emptied, 375ml output, omid and clear. Patient denies needing anything at this time. Call light, bedside table and personal belongings within reach. Will continue to monitor.

## 2021-03-30 NOTE — PROGRESS NOTES
Physical Therapy  Facility/Department: [facility] AT THE [facility] MED SURG  Daily Treatment Note  NAME: Lola Irene  : 1940  MRN: [id number]    Date of Service: 3/30/2021    Discharge Recommendations:  Continue to assess pending progress, Patient would benefit from continued therapy after discharge        Assessment   Treatment Diagnosis: general weakness; difficulty walking  Prognosis: Good;Fair  PT Education: Goals;PT Role;Gait Training;Transfer Training  Patient Education: Pt educated on above with appropriate understanding. REQUIRES PT FOLLOW UP: Yes  Activity Tolerance  Activity Tolerance: Patient limited by fatigue  Activity Tolerance: Extreme fatigue     Patient Diagnosis(es): The primary encounter diagnosis was Pneumonia due to organism. Diagnoses of Acute cystitis without hematuria, Generalized weakness, History of stroke, and Hyponatremia were also pertinent to this visit. has a past medical history of B12 deficiency, CVA (cerebral vascular accident) ([facility] [address] CVD (cerebrovascular disease), and Hypertension. has no past surgical history on file. Restrictions  Restrictions/Precautions  Restrictions/Precautions: General Precautions, Fall Risk  Subjective   General  Chart Reviewed: Yes  Response To Previous Treatment: Patient reporting fatigue but able to participate. Family / Caregiver Present: Yes  Subjective  Subjective: Continues with severe weakness; agrees to up in chair. Orientation  Orientation  Overall Orientation Status: Within Functional Limits  Cognition      Objective   Bed mobility  Supine to Sit: Moderate assistance;Maximum assistance;2 Person assistance  Scooting: Moderate assistance;Maximal assistance;2 Person assistance  Comment: Pt unable to maintain sitting balance at EOB. Transfers  Sit to Stand:  Moderate Assistance;Maximum Assistance;2 Person Assistance  Stand to sit: Moderate Assistance;Maximum Assistance  Stand Pivot Transfers: 2 Person Assistance;Minimal

## 2021-03-30 NOTE — PROGRESS NOTES
Patient had 2 IV, both of which it was time to be changed. Both IVs removed. New IV started in back of right forearm, 22G. Blood draw obtained via off new IV for Midnight Sodium blood draw. Blood returned noted and IV flushed.

## 2021-03-30 NOTE — PROGRESS NOTES
PALLIATIVE CARE  Follow up visit with Severo Saucier and her . Severo Saucier is up in the chair resting with eyes closed.  is present in her room.  tells me that Severo Saucier is really weak and is not really eating. Tells me that she \"gags\" on solid foods then ends up vomiting. He becomes quiet and tearful. Tells me that he knows that she is declining. Listening presence provided. Hospice discussion.  tells me that he thinks it is time for hospice. Wanting to keep her comfortable. Would like to be able to take her home. Tells me that he didn't have a good experience with hospice for his father, but realizes now that he didn't understand what hospice will do. Is open to talking with a hospice agency. Provided with a list of hospice agencies and brochures. Discussed benefits of hospice in pain/symptom management, etc.     Discussed decreased appetite when someone nears end of life. Tells me that that was what his father did also. Reassurance given in regards to medications for nausea, pain, etc.  Thankful for the discussion. Tells me that he would really like to wait for his daughter to come back up from [place]. He is planning on calling her today to see when she would be able to come up. States that it had been after Leon[place] as the original plan. Has a son and daughter in law that live in the area that have been helpful. Tells me that they will be able to help some but that they both work. Support given. Encouraged to review hospice information and let nursing or [facility] [employer] know when decides on agency to meet with. States understanding. Reassurance and support given. Okay with  visit today.  notified. Will continue to follow and support. Katherine Mc, LEXI, Conor Mosqueda and Dominic Palma Nurse Coordinator  3/30/2021 9:59 AM

## 2021-03-31 LAB
ABO/RH: NORMAL
ABSOLUTE EOS #: 0 K/UL (ref 0–0.44)
ABSOLUTE IMMATURE GRANULOCYTE: 0.07 K/UL (ref 0–0.3)
ABSOLUTE LYMPH #: 0.91 K/UL (ref 1.1–3.7)
ABSOLUTE MONO #: 0.63 K/UL (ref 0.1–1.2)
ANION GAP SERPL CALCULATED.3IONS-SCNC: 7 MMOL/L (ref 9–17)
ANTIBODY SCREEN: NEGATIVE
ARM BAND NUMBER: NORMAL
BASOPHILS # BLD: 1 % (ref 0–2)
BASOPHILS ABSOLUTE: 0.07 K/UL (ref 0–0.2)
BLD PROD TYP BPU: NORMAL
BUN BLDV-MCNC: 11 MG/DL (ref 8–23)
BUN/CREAT BLD: 15 (ref 9–20)
CALCIUM SERPL-MCNC: 8.1 MG/DL (ref 8.6–10.4)
CHLORIDE BLD-SCNC: 101 MMOL/L (ref 98–107)
CO2: 23 MMOL/L (ref 20–31)
CREAT SERPL-MCNC: 0.71 MG/DL (ref 0.5–0.9)
CROSSMATCH RESULT: NORMAL
DIFFERENTIAL TYPE: ABNORMAL
DISPENSE STATUS BLOOD BANK: NORMAL
EKG ATRIAL RATE: 102 BPM
EKG P AXIS: 48 DEGREES
EKG P-R INTERVAL: 154 MS
EKG Q-T INTERVAL: 366 MS
EKG QRS DURATION: 94 MS
EKG QTC CALCULATION (BAZETT): 477 MS
EKG R AXIS: 1 DEGREES
EKG T AXIS: 17 DEGREES
EKG VENTRICULAR RATE: 102 BPM
EOSINOPHILS RELATIVE PERCENT: 0 % (ref 1–4)
EXPIRATION DATE: NORMAL
GFR AFRICAN AMERICAN: >60 ML/MIN
GFR NON-AFRICAN AMERICAN: >60 ML/MIN
GFR SERPL CREATININE-BSD FRML MDRD: ABNORMAL ML/MIN/{1.73_M2}
GFR SERPL CREATININE-BSD FRML MDRD: ABNORMAL ML/MIN/{1.73_M2}
GLUCOSE BLD-MCNC: 117 MG/DL (ref 70–99)
HCT VFR BLD CALC: 24 % (ref 36.3–47.1)
HEMOGLOBIN: 7.3 G/DL (ref 11.9–15.1)
IMMATURE GRANULOCYTES: 1 %
LYMPHOCYTES # BLD: 13 % (ref 24–43)
MCH RBC QN AUTO: 24.4 PG (ref 25.2–33.5)
MCHC RBC AUTO-ENTMCNC: 30.4 G/DL (ref 28.4–34.8)
MCV RBC AUTO: 80.3 FL (ref 82.6–102.9)
MONOCYTES # BLD: 9 % (ref 3–12)
MORPHOLOGY: ABNORMAL
NRBC AUTOMATED: 0 PER 100 WBC
PDW BLD-RTO: 19.1 % (ref 11.8–14.4)
PLATELET # BLD: ABNORMAL K/UL (ref 138–453)
PLATELET ESTIMATE: ABNORMAL
PLATELET, FLUORESCENCE: 47 K/UL (ref 138–453)
PLATELET, IMMATURE FRACTION: 2.9 % (ref 1.1–10.3)
PMV BLD AUTO: ABNORMAL FL (ref 8.1–13.5)
POTASSIUM SERPL-SCNC: 3.6 MMOL/L (ref 3.7–5.3)
RBC # BLD: 2.99 M/UL (ref 3.95–5.11)
RBC # BLD: ABNORMAL 10*6/UL
SEG NEUTROPHILS: 76 % (ref 36–65)
SEGMENTED NEUTROPHILS ABSOLUTE COUNT: 5.32 K/UL (ref 1.5–8.1)
SODIUM BLD-SCNC: 130 MMOL/L (ref 135–144)
SODIUM BLD-SCNC: 131 MMOL/L (ref 135–144)
SODIUM BLD-SCNC: 131 MMOL/L (ref 135–144)
TRANSFUSION STATUS: NORMAL
UNIT DIVISION: 0
UNIT NUMBER: NORMAL
WBC # BLD: 7 K/UL (ref 3.5–11.3)
WBC # BLD: ABNORMAL 10*3/UL

## 2021-03-31 PROCEDURE — 85055 RETICULATED PLATELET ASSAY: CPT

## 2021-03-31 PROCEDURE — 85025 COMPLETE CBC W/AUTO DIFF WBC: CPT

## 2021-03-31 PROCEDURE — 80048 BASIC METABOLIC PNL TOTAL CA: CPT

## 2021-03-31 PROCEDURE — 6360000002 HC RX W HCPCS: Performed by: NURSE PRACTITIONER

## 2021-03-31 PROCEDURE — 6370000000 HC RX 637 (ALT 250 FOR IP): Performed by: NURSE PRACTITIONER

## 2021-03-31 PROCEDURE — 36415 COLL VENOUS BLD VENIPUNCTURE: CPT

## 2021-03-31 PROCEDURE — 93005 ELECTROCARDIOGRAM TRACING: CPT | Performed by: NURSE PRACTITIONER

## 2021-03-31 PROCEDURE — 97110 THERAPEUTIC EXERCISES: CPT

## 2021-03-31 PROCEDURE — 94640 AIRWAY INHALATION TREATMENT: CPT

## 2021-03-31 PROCEDURE — 94664 DEMO&/EVAL PT USE INHALER: CPT

## 2021-03-31 PROCEDURE — 99232 SBSQ HOSP IP/OBS MODERATE 35: CPT | Performed by: INTERNAL MEDICINE

## 2021-03-31 PROCEDURE — 1200000000 HC SEMI PRIVATE

## 2021-03-31 PROCEDURE — 2580000003 HC RX 258: Performed by: NURSE PRACTITIONER

## 2021-03-31 PROCEDURE — 2580000003 HC RX 258: Performed by: INTERNAL MEDICINE

## 2021-03-31 PROCEDURE — 6360000002 HC RX W HCPCS

## 2021-03-31 PROCEDURE — 6360000002 HC RX W HCPCS: Performed by: INTERNAL MEDICINE

## 2021-03-31 PROCEDURE — 84295 ASSAY OF SERUM SODIUM: CPT

## 2021-03-31 PROCEDURE — 6370000000 HC RX 637 (ALT 250 FOR IP): Performed by: INTERNAL MEDICINE

## 2021-03-31 PROCEDURE — 93010 ELECTROCARDIOGRAM REPORT: CPT | Performed by: INTERNAL MEDICINE

## 2021-03-31 RX ORDER — AZITHROMYCIN 500 MG/1
INJECTION, POWDER, LYOPHILIZED, FOR SOLUTION INTRAVENOUS
Status: COMPLETED
Start: 2021-03-31 | End: 2021-03-31

## 2021-03-31 RX ADMIN — AZITHROMYCIN DIHYDRATE 500 MG: 500 INJECTION, POWDER, LYOPHILIZED, FOR SOLUTION INTRAVENOUS at 00:24

## 2021-03-31 RX ADMIN — CYANOCOBALAMIN TAB 1000 MCG 500 MCG: 1000 TAB at 08:24

## 2021-03-31 RX ADMIN — AMLODIPINE BESYLATE 2.5 MG: 2.5 TABLET ORAL at 08:24

## 2021-03-31 RX ADMIN — WATER 1000 MG: 1 INJECTION INTRAMUSCULAR; INTRAVENOUS; SUBCUTANEOUS at 00:24

## 2021-03-31 RX ADMIN — SODIUM CHLORIDE, PRESERVATIVE FREE 10 ML: 5 INJECTION INTRAVENOUS at 20:38

## 2021-03-31 RX ADMIN — SODIUM CHLORIDE TAB 1 GM 2 G: 1 TAB at 11:55

## 2021-03-31 RX ADMIN — ALBUTEROL SULFATE 2.5 MG: 2.5 SOLUTION RESPIRATORY (INHALATION) at 07:36

## 2021-03-31 RX ADMIN — AZITHROMYCIN DIHYDRATE 500 MG: 500 INJECTION, POWDER, LYOPHILIZED, FOR SOLUTION INTRAVENOUS at 22:49

## 2021-03-31 RX ADMIN — AZITHROMYCIN DIHYDRATE 500 MG: 500 INJECTION, POWDER, LYOPHILIZED, FOR SOLUTION INTRAVENOUS at 22:53

## 2021-03-31 RX ADMIN — WATER 1000 MG: 1 INJECTION INTRAMUSCULAR; INTRAVENOUS; SUBCUTANEOUS at 22:49

## 2021-03-31 RX ADMIN — HYDROXYZINE HYDROCHLORIDE 25 MG: 25 TABLET, FILM COATED ORAL at 20:38

## 2021-03-31 RX ADMIN — SODIUM CHLORIDE TAB 1 GM 2 G: 1 TAB at 17:16

## 2021-03-31 RX ADMIN — Medication 5000 UNITS: at 08:26

## 2021-03-31 RX ADMIN — METOPROLOL TARTRATE 25 MG: 25 TABLET, FILM COATED ORAL at 20:38

## 2021-03-31 RX ADMIN — METOPROLOL TARTRATE 25 MG: 25 TABLET, FILM COATED ORAL at 08:24

## 2021-03-31 RX ADMIN — SODIUM CHLORIDE, PRESERVATIVE FREE 10 ML: 5 INJECTION INTRAVENOUS at 08:28

## 2021-03-31 RX ADMIN — PANTOPRAZOLE SODIUM 40 MG: 40 TABLET, DELAYED RELEASE ORAL at 08:24

## 2021-03-31 RX ADMIN — ALBUTEROL SULFATE 2.5 MG: 2.5 SOLUTION RESPIRATORY (INHALATION) at 20:58

## 2021-03-31 RX ADMIN — SODIUM CHLORIDE TAB 1 GM 2 G: 1 TAB at 08:24

## 2021-03-31 RX ADMIN — ALBUTEROL SULFATE 2.5 MG: 2.5 SOLUTION RESPIRATORY (INHALATION) at 15:30

## 2021-03-31 RX ADMIN — CYANOCOBALAMIN TAB 1000 MCG 500 MCG: 1000 TAB at 17:16

## 2021-03-31 ASSESSMENT — PAIN SCALES - GENERAL: PAINLEVEL_OUTOF10: 0

## 2021-03-31 NOTE — PROGRESS NOTES
Progress Note    SUBJECTIVE/INTERVAL HISTORY:    Following up on weakness.  sitting at bedside holding wife's hand. She appears to be fatigued and frail. He states that she is taken very little intake and. Dr. Jordana Hill had discussion with him again regarding his lab work that although the sodium is improved her physical condition and ability to eat still remains poor. He does continue him to think about skilled care or assistance at home with her as at this time she is total care. OBJECTIVE:    Vitals:   Temp: 98.3 °F (36.8 °C)  Temp range:    Temp  Av.1 °F (37.3 °C)  Min: 98.3 °F (36.8 °C)  Max: 99.7 °F (37.6 °C)    BP: (!) 145/66  BP Range:      Systolic (03OKA), QMV:090 , Min:143 , PEY:070      Diastolic (73NIL), BZX:95, Min:60, Max:66    Pulse: 76  Pulse Range:    Pulse  Av.6  Min: 76  Max: 87    Resp: 16  Resp Range:   Resp  Av.2  Min: 15  Max: 18    SpO2: 94 % on supplemental O2  SpO2 range:   SpO2  Av.2 %  Min: 91 %  Max: 94 %    24HR INTAKE/OUTPUT:      Intake/Output Summary (Last 24 hours) at 3/31/2021 1508  Last data filed at 3/31/2021 1254  Gross per 24 hour   Intake 315 ml   Output 2950 ml   Net -2635 ml       -----------------------------------------------------------------    Exam:  GEN: Fatigued but is oriented to person, place and time, frail, in no acute distress  EYES: No gross abnormalities. , PERRL and EOMI  NECK: normal, supple, no lymphadenopathy,  no carotid bruits  PULM: Expiratory wheeze throughout  COR: no murmurs, no gallops, irregularly irregular, S1 normal and S2 normal  ABD:  soft, non-tender, non-distended, normal bowel sounds, no masses or organomegaly  EXT:   no cyanosis, clubbing or edema present    NEURO: follows commands, RICHEY, no deficits  SKIN:  no rashes or significant lesions      -----------------------------------------------------------------  Diagnostic Data:  Lab Results   Component Value Date    WBC 7.0 2021    HGB 7.3 (L) 2021 artery 02/06/2018       PLAN:  Principal Problem:    Pneumonia due to organism suspect aspiration  Active Problems:    Essential hypertension    Anemia of chronic renal failure, stage 3 (moderate)    Hypokalemia    Severe malnutrition (HCC)    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 [address]  Resolved Problems:    * No resolved hospital problems. *    · Acute respiratory distress  ? Resolved  ? Likely due to new onset atrial fibrillation  ? supplemental O2  · New onset atrial fibrillation with RVR  ? Continue metoprolol 25 mg twice daily  ? Cardiology consult  · Pneumonia  ? Continue IV Rocephin/Zithromax  ? Appreciate speech therapy for swallow eval  ? Barium swallow shows no aspiration  ? Suction as needed  · Dehydration  ? IVF'srestart at 50 mL's per hour  ? Monitor renal function / lytes  · Anemia   ? Hb dropped from 8.3 to 6.6  ? S/p 1 u PRBC transfusion  - repeat Hb 7.9  ? Hemoglobin 7.3 today. We will not transfuse unless she is less than 7  ? CBC in the morning  ? Stool quaiacnegative  ? Continue vitamin B12  ? Appreciate oncology  · Generalized weakness  ? PT/OTmax assist  · Hypokalemia  ? 3.6 today  · Hyponatremia   ? Improved  ? 1800 mL fluid restriction  ? Nephrology consult initiated  ? Concern for SIADH due to renal carcinoma  ? Stop lisinopril  ?  Continue Norvasc 2.5 mg daily  · Moderate malnutrition   · Dietary consult  · DVT prophylaxis: Lovenox  · High risk medications:  None  · Discharge plan: Pending    STEVE Chavira, NP-C  3/31/2021, 3:08 PM

## 2021-03-31 NOTE — PROGRESS NOTES
Palliative Care Follow Up    Marvin Hong is awake in bed. Her  speaks about her therapy today. She was up and sat in the chair and he talks of her being tired. During the conversation she shakes her head yes to this. Denies pain or discomfort. Segun Davis states that he was able to speak to his daughter briefly on the phone again but they were unable to discuss her plans and if she was going to be able to stay with them. Monserrat Lilly states that he is waiting for the oncologist to come and speak with him this afternoon. He is hopeful that they will be able to continue treatment. We discuss her current weakened state. Segun Davis states \"I know with everything she has going on she is going to take a long while to come back, if she can make it back. But we have to try. \" States that she has been drinking more today and he sees a Dariana change in her from yesterday. I am just hopeful that I am going to get her back. \" Support provided.      Penelope Carter and Ilda Nurse Coordinator  3/31/2021 3:37 PM

## 2021-03-31 NOTE — PROGRESS NOTES
Occupational Therapy  Facility/Department: [facility] AT THE [facility] MED SURG  Daily Treatment Note  NAME: Paul Terry  : 1940  MRN: [id number]    Date of Service: 3/31/2021    Discharge Recommendations:  Continue to assess pending progress, Subacute/Skilled Nursing Facility, Home with Home health OT       Assessment      OT Education: OT Role;Plan of Care;Family Education;Equipment;Home Exercise Program;Transfer Training  Patient Education:  and clinician discussed DME/AE for use in home, continuation of previously provided HEP  Activity Tolerance  Activity Tolerance: Patient limited by fatigue  Safety Devices  Safety Devices in place: Yes  Type of devices: Left in chair;Call light within reach(/son present)         Patient Diagnosis(es): The primary encounter diagnosis was Pneumonia due to organism. Diagnoses of Acute cystitis without hematuria, Generalized weakness, History of stroke, and Hyponatremia were also pertinent to this visit. has a past medical history of B12 deficiency, CVA (cerebral vascular accident) ([address] CVD (cerebrovascular disease), and Hypertension. has no past surgical history on file. Restrictions  Restrictions/Precautions  Restrictions/Precautions: General Precautions, Fall Risk  Subjective   General  Chart Reviewed: Yes  Patient assessed for rehabilitation services?: Yes  Response to previous treatment: Patient with no complaints from previous session  Family / Caregiver Present: Yes(/son)  Referring Practitioner: Kishore Biswas MD  Diagnosis: Pnemonia  Subjective  Subjective: Pt  reports pt had stroke 2 yrs ago, cancer Dx 3 months ago, and now has pnemonia. Pt has been asperating and throwing up d/t food not tasting good secondary to cancer tx. General Comment  Comments: Pt in chair upon BURRIS arrival, limited by endurance.   Vital Signs  Patient Currently in Pain: Denies   Orientation     Objective    ADL  Additional Comments:  reports he is A with

## 2021-03-31 NOTE — PROGRESS NOTES
[facility]  Inpatient/Observation/Outpatient Rehabilitation    Date: 3/31/2021  Patient Name: Allen Mccain       [x] Inpatient Acute/Observation       []  Outpatient  : 1940       [] Pt no showed for scheduled appointment    [x] Pt refused/declined therapy at this time due to:           [] Pt cancelled due to:  [] No Reason Given   [] Sick/ill   [] Other:    Bobby Hernandez declined therapy treatment at 3:04pm d/t fatigue. Education provided on benefits with patient continuing to decline. Kusum Carrizales RN alerted.       Herminio Skinner, MAE Date: 3/31/2021

## 2021-03-31 NOTE — PROGRESS NOTES
Patient turned onto her left side at this time from her right side. Patient denies any further needs.  Checked for stool and patient brief is dry

## 2021-03-31 NOTE — PROGRESS NOTES
Patient assessment and vitals completed. Patient is very soft spoken, flat affect and withdrawn. She denies any pain, dizziness or SOB. See charting on vitals and assessment.  Patient denies to be turned at this time

## 2021-03-31 NOTE — PROGRESS NOTES
Palliative Care Follow Up    Krystle Montaño is resting in bed. She is more responsive today than on Friday during our last discussion. She opens her eyes and when her name is called and states that she is comfortable. She denies pain at this time and quickly goes back to sleep. Her  Sandra Crespo is at the bedside. States that he is waiting to speak with the oncologist about her care. \"If we can do treatment we are going to do that. \" We discuss what the plan would be if no further treatment was offered. Sandra Crespo is unwilling to even discuss this option. States that all of his children are in agreement with the decision to pursue treatment if it is even remotely an option. We discuss her current state, her weight loss and poor appetite. He is unwilling to discuss any options further until he talks with the oncologist. Will follow up with him this afternoon. Discussed with the  and CNP.  will follow up with them later today.      [address] Nurse Coordinator  3/31/2021 11:27 AM

## 2021-03-31 NOTE — PROGRESS NOTES
Renal Progress Note  [facility]      TELEHEALTH EVALUATION -- Audio/Visual (During NGCSX-76 public health emergency)     Telehealth service was provided with the patient at her room in [address] and myself the physician in my office in [address] [place] [address] and my the patient's RN, Nick Wells, who has initiated the visit. Pursuant to the emergency declaration under the [address] [place] [address] [facility] authority and the [employer] Act, this Virtual  Visit was conducted, with patient's consent, to reduce the patient's risk of exposure to COVID-19 and provide continuity of care for an established patient. Services were provided through a video synchronous discussion virtually to substitute for in-person clinic visit. Patient :  Lola Irene; 79 y.o. MRN# [id number]  Location:  0320/0320-01  Attending:  Bernardo Freire MD  Admit Date:  3/25/2021   Hospital Day: 5      Subjective:     Nephrology is following the patient for hyponatremia. Patient was evaluated via video visit.  at bedside. She is more alert today.     Outpatient Medications:     Medications Prior to Admission: linezolid (ZYVOX) 600 MG tablet, Take 1 tablet by mouth 2 times daily for 14 days  hydrOXYzine (ATARAX) 25 MG tablet, Take 25 mg by mouth nightly  pantoprazole (PROTONIX) 40 MG tablet, TAKE 1 TABLET BY MOUTH EVERY DAY BEFORE BREAKFAST  lisinopril (PRINIVIL;ZESTRIL) 10 MG tablet, Take 1 tablet by mouth 2 times daily  Cholecalciferol (VITAMIN D3) 125 MCG (5000 UT) TABS, Take 5,000 Units by mouth daily  Cyanocobalamin (VITAMIN B 12) 500 MCG TABS, Take 1 tablet by mouth 2 times daily     Current Medications:     Scheduled Meds:    amLODIPine  2.5 mg Oral Daily    Vitamin D  5,000 Units Oral Daily    metoprolol tartrate  25 mg Oral BID    sodium chloride  2 g Oral TID WC    IVPB builder  500 mg Intravenous Q24H    vitamin B-12 500 mcg Oral BID    hydrOXYzine  25 mg Oral Nightly    pantoprazole  40 mg Oral QAM AC    cefTRIAXone (ROCEPHIN) IV  1,000 mg Intravenous Q24H    sodium chloride flush  10 mL Intravenous 2 times per day    potassium chloride  40 mEq Oral Once    albuterol  2.5 mg Nebulization TID     Continuous Infusions:    sodium chloride       PRN Meds:  sodium chloride, sodium chloride flush, promethazine **OR** ondansetron, polyethylene glycol, acetaminophen **OR** acetaminophen    Input/Output:       I/O last 3 completed shifts: In: 365 [P.O.:365]  Out: 2300 [Urine:2300]. Patient Vitals for the past 96 hrs (Last 3 readings):   Weight   21 0530 105 lb 1.6 oz (47.7 kg)       Vital Signs:   Temperature:  Temp: 98.6 °F (37 °C)  TMax:   Temp (24hrs), Av.2 °F (37.3 °C), Min:98.6 °F (37 °C), Max:99.7 °F (37.6 °C)    Respirations:  Resp: 18  Pulse:   Pulse: 87  BP:    BP: (!) 143/64  BP Range: Systolic (47SUI), OGD:806 , Min:143 , JXU:468       Diastolic (03VRO), ZWW:86, Min:60, Max:66      Physical Examination:     General -- awake, frail chronically ill , more alert  Oral Mucosa -- moist  Neck --  JVD - no  Extremities -- no edema,   CNS - awake and alert   Pschy - not agitated, mood and memory normal    Due to this being a TeleHealth encounter, evaluation of the following organ systems is limited: Vitals/EENT/Resp/CV/GI//MS/Neuro/Skin/Heme-Lymph-Imm.   Labs:       Recent Labs     21  0520 21  0550   WBC 8.7 7.0   RBC 3.28* 2.99*   HGB 8.1* 7.3*   HCT 26.5* 24.0*   MCV 80.8* 80.3*   MCH 24.7* 24.4*   MCHC 30.6 30.4   RDW 18.7* 19.1*   PLT See Reflexed IPF Result See Reflexed IPF Result   MPV NOT REPORTED NOT REPORTED      BMP:   Recent Labs     21  0520 21  0520 21  0600 21  0600 21  0015 21  0550 21  1115   *   < > 120*   < > 130* 131* 131*   K 4.2  --  4.0  --   --  3.6*  --    CL 94*  --  91*  --   --  101  --    CO2 22  --  22  --   --  23  -- BUN 8  --  10  --   --  11  --    CREATININE 0.62  --  0.60  --   --  0.71  --    GLUCOSE 116*  --  102*  --   --  117*  --    CALCIUM 8.1*  --  7.9*  --   --  8.1*  --     < > = values in this interval not displayed. Phosphorus:   No results for input(s): PHOS in the last 72 hours. Magnesium:  No results for input(s): MG in the last 72 hours. Albumin:  No results for input(s): LABALBU in the last 72 hours. BNP:    No results found for: BNP  VINAY:      Lab Results   Component Value Date    VINAY NEGATIVE 03/09/2018     SPEP:  Lab Results   Component Value Date    PROT 6.1 03/26/2021    PATH ELECTRONICALLY SIGNED.  Tushar Lam M.D. 11/17/2020     UPEP:   No results found for: LABPE  C3:   No results found for: C3  C4:   No results found for: C4  MPO ANCA:   No results found for: MPO  PR3 ANCA:   No results found for: PR3  Anti-GBM:   No results found for: GBMABIGG  Hep BsAg:       No results found for: HEPBSAG  Hep C AB:        No results found for: HEPCAB    Urinalysis/Chemistries:      Lab Results   Component Value Date    NITRU NEGATIVE 03/25/2021    COLORU YELLOW 03/25/2021    PHUR 6.0 03/25/2021    WBCUA 0 TO 2 03/25/2021    RBCUA 0 TO 2 03/25/2021    MUCUS TRACE 03/25/2021    TRICHOMONAS NOT REPORTED 03/25/2021    YEAST NOT REPORTED 03/25/2021    BACTERIA TRACE 03/25/2021    SPECGRAV <1.005 03/25/2021    LEUKOCYTESUR NEGATIVE 03/25/2021    UROBILINOGEN Normal 03/25/2021    BILIRUBINUR NEGATIVE 03/25/2021    GLUCOSEU NEGATIVE 03/25/2021    KETUA NEGATIVE 03/25/2021    AMORPHOUS NOT REPORTED 03/25/2021     Urine Sodium:     Lab Results   Component Value Date    ANYI 145 03/29/2021     Urine Potassium:  No results found for: KUR  Urine Chloride:  No results found for: CLUR  Urine Osmolarity:   Lab Results   Component Value Date    OSMOU 495 03/29/2021     Urine Protein:   No components found for: TOTALPROTEIN, URINE   Urine Creatinine:   No results found for: LABCREA  Urine Eosinophils:  No components found for: AIDE        Impression and Plan:  1. 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 Has been difficult to treat. improved after Tolvaptan 15 mg on 3/30. Up to 131 today. Will hold Tolvaptan today. Continue salt tabs, fluid restriction. Recheck in AM.  Discussed with pharmacy about getting ureNa packets for her which we might be able to use at discharge , pharmacist is going to look into it    2. Hypokalemia, mild  3. Anemia s/p blood transfusion  4. Thrombocytopenia  5. Metastatic renal cell carcinoma:family requesting to discuss with oncologist further options  6. Severe hypoalbuminemia  7. Hypocalcemia which corrects for hypoalbuminemia  8. New onset Afib  9. Aspiration pneumonia  10. Diastolic CHF    Overall prognosis is guarded. Please don't hesitate to call with any questions.   Electronically signed by Erickson Roe DO on 3/31/2021 at 1:25 PM

## 2021-03-31 NOTE — PROGRESS NOTES
[physician-ordered bronchodilator(s)]  QID and Albuterol PRN q4 hrs. Breath-Actuated Neb if BBS Acuity = 4, and pt. can use MP. Notify physician if condition deteriorates. MDI THERAPY with  2 actuations of [physician-ordered bronchodilator(s)] via spacer TID Albuterol and PRNq4 hrs. If unable to utilize MDI: HHN [physician-ordered bronchodilator(s)] TID and Albuterol PRN q4 hrs. Notify physician if condition deteriorates. MDI THERAPY with  [physician-ordered bronchodilator(s)] via spacer TID PRN. If unable to utilize MDI: HHN [physician-ordered bronchodilator(s)] TID PRN. Notify physician if condition deteriorates. If Acuity Level is 2, 3, or 4 in any of the following:    [] COUGH     [] SURGICAL HISTORY (SURG HX)  [] CHEST XRAY (CXR)    Goal: Improvement in sputum mobilization in patients with ineffective airway clearance. Reverse atelectasis. [] Bronchopulmonary Hygiene Protocol    Total Acuity:   16-32  []  Secondary Assessment in 24 hrs Total Acuity:  9-15  []  Secondary Assessment in 24 hrs Total Acuity:  4-8  []  Secondary Assessment in 48 hrs Total Acuity:  0-3  []  Secondary Assessment in 72 hrs   METANEB QID with [physician-ordered bronchodilator(s)] if CXR Acuity = 4; otherwise:  PD&P, PEP, or Vest QID & PRN  NT Sxn PRN for ineffective cough  METANEB QID with [physician-ordered bronchodilator(s)] if CXR Acuity = 4; otherwise:  PD&P, PEP, or Vest TID & PRN  NT Sxn PRN for ineffective cough  Instruct patient to self-perform IS q1hr WA   Directed Cough self-performed q1hr WA     If Acuity Level is 2 or above in the following:    [] PULMONARY HISTORY (PULM HX)    Goal: Assist patient in quitting smoking to slow or stop the progression of lung disease.     [] Smoking Cessation Protocol    SMOKING CESSATION EDUCATION provided according to policy ZS_480: (dolly with an X)  ____Yes    ____ No     ____ NA    Smoking Cessation Booklet given:  ____Yes  ____No ____Patient Alice Bullock

## 2021-03-31 NOTE — PROGRESS NOTES
Dr. Altagracia Carter aware of oncology consult. Case discussed with Marjorie Salas NP. Patient's spouse updated on orders.

## 2021-03-31 NOTE — PLAN OF CARE
Problem: Falls - Risk of:  Goal: Will remain free from falls  Description: Will remain free from falls  Outcome: Ongoing  Note: Patient at high risk for falls. Fall precautions in place. Non skid slipper socks on, bed in lowest position with wheels locked and siderails up x2, bed alarm on. Patient is being monitored on a regular basis . Call light within reach       Problem: Skin Integrity:  Goal: Will show no infection signs and symptoms  Description: Will show no infection signs and symptoms  Outcome: Ongoing  Note: Skin assessment performed at regular intervals. No redness or open areas noted. Pt able to turn self, assistance provided when needed. Will continue to monitor patient closely for breakdown. Problem: Infection:  Goal: Will remain free from infection  Description: Will remain free from infection  Outcome: Ongoing  Note: Patient is afebrile, WBC wnl. ATB continue as ordered for pneumonia diagnosis. Problem: Safety:  Goal: Free from accidental physical injury  Description: Free from accidental physical injury  Outcome: Ongoing  Note: Pt with id band correct and in place. Bed in low positions, wheels locked, 2/4 siderails up. Call light in reach. Problem: Pain:  Goal: Patient's pain/discomfort is manageable  Description: Patient's pain/discomfort is manageable  Outcome: Ongoing  Note: Pain assessment completed routinely. Will continue to monitor and medicate as needed. Problem: Airway Clearance - Ineffective:  Goal: Clear lung sounds  Description: Clear lung sounds  Outcome: Ongoing  Note: Lungs are clear, diminished t/o. Takes few bites of breakfast without difficulty. No s/s aspiration noted. Problem: Gas Exchange - Impaired:  Goal: Levels of oxygenation will improve  Description: Levels of oxygenation will improve  Outcome: Ongoing  Note: Maintains Spo2 low 90s on room air. No sob or dyspnea noted.

## 2021-03-31 NOTE — PROGRESS NOTES
Patient with elevated HR noted on monitor, 130-150's at times. HR fluctuates 90s-110's when writer present in room. Patient resting in bed,  at bedside. No s/s distress noted. EKG obtained, shows ST with PAC's. Patient and spouse denies c/o's or needs at this time. Call light in reach. Herbert Spangler NP updated.

## 2021-03-31 NOTE — PROGRESS NOTES
Per re-eval of malnutrition status, this  patient meets criteria for SEVERE malnutrition based on ASPEN and AND guidelines.     Electronically signed by Jose Francisco Coats RDN, LD 3/31/2021, 8:07 AM

## 2021-03-31 NOTE — PROGRESS NOTES
Physical Therapy  Facility/Department: [facility] hospital AT THE [facility] MED SURG  Daily Treatment Note  NAME: Papa Rueda  : 1940  MRN: [id number]    Date of Service: 3/31/2021    Discharge Recommendations:  Continue to assess pending progress, Patient would benefit from continued therapy after discharge        Assessment   Prognosis: Good;Fair  PT Education: PT Role;Goals  Patient Education: Pt educated on above with appropriate understanding. REQUIRES PT FOLLOW UP: Yes  Activity Tolerance  Activity Tolerance: Patient limited by fatigue  Activity Tolerance: Extreme fatigue     Patient Diagnosis(es): The primary encounter diagnosis was Pneumonia due to organism. Diagnoses of Acute cystitis without hematuria, Generalized weakness, History of stroke, and Hyponatremia were also pertinent to this visit. has a past medical history of B12 deficiency, CVA (cerebral vascular accident) ([address] CVD (cerebrovascular disease), and Hypertension. has no past surgical history on file. Restrictions  Restrictions/Precautions  Restrictions/Precautions: General Precautions, Fall Risk  Subjective   General  Chart Reviewed: Yes  Response To Previous Treatment: Patient reporting fatigue but able to participate. Family / Caregiver Present: Yes( present)  Subjective  Subjective: Pt declines OOB at this time, agreeable for bed ex. Pt denies any pain. General Comment  Comments: Pt with appropriate response to questions asked but kept eyes closed throughout treatment. Orientation  Orientation  Overall Orientation Status: Within Functional Limits  Cognition      Objective      Transfers  Comment: Pt declined OOB at this time. Ambulation  Ambulation?: No  Ambulation 1  Comments: Pt declined at this time. Exercises  Straight Leg Raise: x15  Heelslides: x15  Hip Abduction: x15  Knee Short Arc Quad: x15  Ankle Pumps: x15  Comments: Pt completed above listed ex in supine position. Assist as needed d/t muscle fatigue. Extended rest period required between each set. G-Code     OutComes Score    AM-PAC Score    Goals  Short term goals  Time Frame for Short term goals: 14 days  Short term goal 1: Pt to ambulate 30ft HHA with no LOB. Short term goal 2: Pt to tolerate 20-30mins ther ex/act for improved strength and endurance with ADL's. Short term goal 3: Pt to demonstrate good sitting balance. Short term goal 4: Pt to perform bed mob and transfers with min A +1. Patient Goals   Patient goals : none stated    Plan    Plan  Times per week: 7 x week  Times per day: Twice a day(daily on weekends)  Current Treatment Recommendations: Strengthening, Neuromuscular Re-education, Home Exercise Program, Safety Education & Training, Balance Training, Endurance Training, Patient/Caregiver Education & Training, Functional Mobility Training, Transfer Training, Gait Training, Stair training  Safety Devices  Type of devices:  All fall risk precautions in place, Call light within reach, Left in bed, Nurse notified( present)     Therapy Time   Individual Concurrent Group Co-treatment   Time In 0917         Time Out 0940         Minutes [address] [place], [place]

## 2021-04-01 LAB
ABSOLUTE EOS #: 0.07 K/UL (ref 0–0.44)
ABSOLUTE IMMATURE GRANULOCYTE: 0.07 K/UL (ref 0–0.3)
ABSOLUTE LYMPH #: 1.08 K/UL (ref 1.1–3.7)
ABSOLUTE MONO #: 0.72 K/UL (ref 0.1–1.2)
ANION GAP SERPL CALCULATED.3IONS-SCNC: 7 MMOL/L (ref 9–17)
BASOPHILS # BLD: 1 % (ref 0–2)
BASOPHILS ABSOLUTE: 0.07 K/UL (ref 0–0.2)
BUN BLDV-MCNC: 12 MG/DL (ref 8–23)
BUN/CREAT BLD: 18 (ref 9–20)
CALCIUM SERPL-MCNC: 8.1 MG/DL (ref 8.6–10.4)
CHLORIDE BLD-SCNC: 98 MMOL/L (ref 98–107)
CO2: 26 MMOL/L (ref 20–31)
CREAT SERPL-MCNC: 0.67 MG/DL (ref 0.5–0.9)
DIFFERENTIAL TYPE: ABNORMAL
EOSINOPHILS RELATIVE PERCENT: 1 % (ref 1–4)
GFR AFRICAN AMERICAN: >60 ML/MIN
GFR NON-AFRICAN AMERICAN: >60 ML/MIN
GFR SERPL CREATININE-BSD FRML MDRD: ABNORMAL ML/MIN/{1.73_M2}
GFR SERPL CREATININE-BSD FRML MDRD: ABNORMAL ML/MIN/{1.73_M2}
GLUCOSE BLD-MCNC: 116 MG/DL (ref 70–99)
HCT VFR BLD CALC: 26 % (ref 36.3–47.1)
HEMOGLOBIN: 7.6 G/DL (ref 11.9–15.1)
IMMATURE GRANULOCYTES: 1 %
LYMPHOCYTES # BLD: 15 % (ref 24–43)
MAGNESIUM: 1.8 MG/DL (ref 1.6–2.6)
MCH RBC QN AUTO: 24.2 PG (ref 25.2–33.5)
MCHC RBC AUTO-ENTMCNC: 29.2 G/DL (ref 28.4–34.8)
MCV RBC AUTO: 82.8 FL (ref 82.6–102.9)
MONOCYTES # BLD: 10 % (ref 3–12)
MORPHOLOGY: ABNORMAL
NRBC AUTOMATED: 0 PER 100 WBC
PDW BLD-RTO: 19.1 % (ref 11.8–14.4)
PLATELET # BLD: ABNORMAL K/UL (ref 138–453)
PLATELET ESTIMATE: ABNORMAL
PLATELET, FLUORESCENCE: 43 K/UL (ref 138–453)
PLATELET, IMMATURE FRACTION: 4.2 % (ref 1.1–10.3)
PMV BLD AUTO: ABNORMAL FL (ref 8.1–13.5)
POTASSIUM SERPL-SCNC: 3.4 MMOL/L (ref 3.7–5.3)
RBC # BLD: 3.14 M/UL (ref 3.95–5.11)
RBC # BLD: ABNORMAL 10*6/UL
SEG NEUTROPHILS: 72 % (ref 36–65)
SEGMENTED NEUTROPHILS ABSOLUTE COUNT: 5.19 K/UL (ref 1.5–8.1)
SODIUM BLD-SCNC: 131 MMOL/L (ref 135–144)
WBC # BLD: 7.2 K/UL (ref 3.5–11.3)
WBC # BLD: ABNORMAL 10*3/UL

## 2021-04-01 PROCEDURE — 6360000002 HC RX W HCPCS: Performed by: NURSE PRACTITIONER

## 2021-04-01 PROCEDURE — 6370000000 HC RX 637 (ALT 250 FOR IP): Performed by: NURSE PRACTITIONER

## 2021-04-01 PROCEDURE — 83735 ASSAY OF MAGNESIUM: CPT

## 2021-04-01 PROCEDURE — 97530 THERAPEUTIC ACTIVITIES: CPT

## 2021-04-01 PROCEDURE — 6360000002 HC RX W HCPCS

## 2021-04-01 PROCEDURE — 97535 SELF CARE MNGMENT TRAINING: CPT

## 2021-04-01 PROCEDURE — 6370000000 HC RX 637 (ALT 250 FOR IP): Performed by: INTERNAL MEDICINE

## 2021-04-01 PROCEDURE — 6360000002 HC RX W HCPCS: Performed by: INTERNAL MEDICINE

## 2021-04-01 PROCEDURE — 2580000003 HC RX 258: Performed by: NURSE PRACTITIONER

## 2021-04-01 PROCEDURE — 94761 N-INVAS EAR/PLS OXIMETRY MLT: CPT

## 2021-04-01 PROCEDURE — 99232 SBSQ HOSP IP/OBS MODERATE 35: CPT | Performed by: INTERNAL MEDICINE

## 2021-04-01 PROCEDURE — 85025 COMPLETE CBC W/AUTO DIFF WBC: CPT

## 2021-04-01 PROCEDURE — 80048 BASIC METABOLIC PNL TOTAL CA: CPT

## 2021-04-01 PROCEDURE — 1200000000 HC SEMI PRIVATE

## 2021-04-01 PROCEDURE — 36415 COLL VENOUS BLD VENIPUNCTURE: CPT

## 2021-04-01 PROCEDURE — 94640 AIRWAY INHALATION TREATMENT: CPT

## 2021-04-01 PROCEDURE — 85055 RETICULATED PLATELET ASSAY: CPT

## 2021-04-01 PROCEDURE — 97110 THERAPEUTIC EXERCISES: CPT

## 2021-04-01 PROCEDURE — 2580000003 HC RX 258: Performed by: INTERNAL MEDICINE

## 2021-04-01 RX ORDER — SODIUM CHLORIDE AND POTASSIUM CHLORIDE .9; .15 G/100ML; G/100ML
SOLUTION INTRAVENOUS CONTINUOUS
Status: DISCONTINUED | OUTPATIENT
Start: 2021-04-01 | End: 2021-04-02 | Stop reason: HOSPADM

## 2021-04-01 RX ORDER — MIRTAZAPINE 15 MG/1
7.5 TABLET, FILM COATED ORAL NIGHTLY
Status: DISCONTINUED | OUTPATIENT
Start: 2021-04-01 | End: 2021-04-02 | Stop reason: HOSPADM

## 2021-04-01 RX ORDER — AZITHROMYCIN 500 MG/1
INJECTION, POWDER, LYOPHILIZED, FOR SOLUTION INTRAVENOUS
Status: COMPLETED
Start: 2021-04-01 | End: 2021-04-01

## 2021-04-01 RX ORDER — ALBUTEROL SULFATE 2.5 MG/3ML
2.5 SOLUTION RESPIRATORY (INHALATION) EVERY 4 HOURS PRN
Status: DISCONTINUED | OUTPATIENT
Start: 2021-04-01 | End: 2021-04-02 | Stop reason: HOSPADM

## 2021-04-01 RX ADMIN — ALBUTEROL SULFATE 2.5 MG: 2.5 SOLUTION RESPIRATORY (INHALATION) at 20:51

## 2021-04-01 RX ADMIN — Medication 5000 UNITS: at 08:44

## 2021-04-01 RX ADMIN — CYANOCOBALAMIN TAB 1000 MCG 500 MCG: 1000 TAB at 17:46

## 2021-04-01 RX ADMIN — AZITHROMYCIN DIHYDRATE 500 MG: 500 INJECTION, POWDER, LYOPHILIZED, FOR SOLUTION INTRAVENOUS at 23:34

## 2021-04-01 RX ADMIN — SODIUM CHLORIDE TAB 1 GM 2 G: 1 TAB at 08:44

## 2021-04-01 RX ADMIN — HYDROXYZINE HYDROCHLORIDE 25 MG: 25 TABLET, FILM COATED ORAL at 20:48

## 2021-04-01 RX ADMIN — METOPROLOL TARTRATE 25 MG: 25 TABLET, FILM COATED ORAL at 08:44

## 2021-04-01 RX ADMIN — SODIUM CHLORIDE, PRESERVATIVE FREE 10 ML: 5 INJECTION INTRAVENOUS at 08:47

## 2021-04-01 RX ADMIN — AMLODIPINE BESYLATE 2.5 MG: 2.5 TABLET ORAL at 08:44

## 2021-04-01 RX ADMIN — ALBUTEROL SULFATE 2.5 MG: 2.5 SOLUTION RESPIRATORY (INHALATION) at 10:20

## 2021-04-01 RX ADMIN — PANTOPRAZOLE SODIUM 40 MG: 40 TABLET, DELAYED RELEASE ORAL at 08:44

## 2021-04-01 RX ADMIN — SODIUM CHLORIDE TAB 1 GM 2 G: 1 TAB at 17:46

## 2021-04-01 RX ADMIN — ALBUTEROL SULFATE 2.5 MG: 2.5 SOLUTION RESPIRATORY (INHALATION) at 15:44

## 2021-04-01 RX ADMIN — CYANOCOBALAMIN TAB 1000 MCG 500 MCG: 1000 TAB at 08:44

## 2021-04-01 RX ADMIN — SODIUM CHLORIDE AND POTASSIUM CHLORIDE: .9; .15 SOLUTION INTRAVENOUS at 10:43

## 2021-04-01 RX ADMIN — POTASSIUM BICARBONATE 40 MEQ: 782 TABLET, EFFERVESCENT ORAL at 17:46

## 2021-04-01 RX ADMIN — WATER 1000 MG: 1 INJECTION INTRAMUSCULAR; INTRAVENOUS; SUBCUTANEOUS at 23:34

## 2021-04-01 RX ADMIN — SODIUM CHLORIDE TAB 1 GM 2 G: 1 TAB at 12:28

## 2021-04-01 RX ADMIN — Medication 15 G: at 10:49

## 2021-04-01 RX ADMIN — METOPROLOL TARTRATE 25 MG: 25 TABLET, FILM COATED ORAL at 20:48

## 2021-04-01 ASSESSMENT — PAIN SCALES - GENERAL
PAINLEVEL_OUTOF10: 0

## 2021-04-01 NOTE — PLAN OF CARE
Problem: Falls - Risk of:  Goal: Will remain free from falls  Description: Will remain free from falls  3/31/2021 2004 by Mark Dodd RN  Outcome: Ongoing     Problem: Skin Integrity:  Goal: Will show no infection signs and symptoms  Description: Will show no infection signs and symptoms  3/31/2021 2004 by Mark Dodd RN  Outcome: Ongoing     Problem: Infection:  Goal: Will remain free from infection  Description: Will remain free from infection  3/31/2021 2004 by Mark Dodd RN  Outcome: Ongoing     Problem: Safety:  Goal: Free from accidental physical injury  Description: Free from accidental physical injury  3/31/2021 2004 by Mark Dodd RN  Outcome: Ongoing     Problem: Daily Care:  Goal: Daily care needs are met  Description: Daily care needs are met  3/31/2021 2004 by Mark Dodd RN  Outcome: Ongoing     Problem: Skin Integrity:  Goal: Skin integrity will stabilize  Description: Skin integrity will stabilize  Outcome: Ongoing     Problem: Discharge Planning:  Goal: Discharged to appropriate level of care  Description: Discharged to appropriate level of care  Outcome: Ongoing     Problem: Airway Clearance - Ineffective:  Goal: Clear lung sounds  Description: Clear lung sounds  3/31/2021 2004 by Mark Dodd RN  Outcome: Ongoing     Problem: Gas Exchange - Impaired:  Goal: Levels of oxygenation will improve  Description: Levels of oxygenation will improve  3/31/2021 2004 by Mark Dodd RN  Outcome: Ongoing     Problem: Hyperthermia:  Goal: Ability to maintain a body temperature in the normal range will improve  Description: Ability to maintain a body temperature in the normal range will improve  Outcome: Ongoing     Problem: Musculor/Skeletal Functional Status  Goal: Highest potential functional level  Outcome: Ongoing

## 2021-04-01 NOTE — PROGRESS NOTES
The  called me and we discussed what hospice does and home health. He is going to let me know in the morning what he is going to do when it comes to the discharge plan.     GIOVANY Burgess

## 2021-04-01 NOTE — PROGRESS NOTES
Writer sitting outside of patient's room charting at nearby pod and writer heard patient in room. Writer goes in and patient states \"thank goodness your here. I can't get out of bed myself. \" When questioned patient states \"I need you to help me to my room. \" Writer instructed that patient was in the hospital and patient replied \"I know! But my room is over there. \" Patient reorientated at this time. Patient oriented to self at this time. Vital signs and assessment completed. Patient got repositioned and comfortable. Patient denies any pain or needs at this time. Will continue to monitor.

## 2021-04-01 NOTE — PROGRESS NOTES
Physical Therapy  Facility/Department: [facility] AT THE [facility] MED SURG  Daily Treatment Note  NAME: Karlos Lozada  : 1940  MRN: [id number]    Date of Service: 2021    Discharge Recommendations:  Continue to assess pending progress, Patient would benefit from continued therapy after discharge        Assessment   Treatment Diagnosis: general weakness; difficulty walking  Prognosis: Good;Fair  PT Education: PT Role;Goals  Patient Education: Pt educated on above with appropriate understanding. REQUIRES PT FOLLOW UP: Yes  Activity Tolerance  Activity Tolerance: Patient limited by fatigue     Patient Diagnosis(es): The primary encounter diagnosis was Pneumonia due to organism. Diagnoses of Acute cystitis without hematuria, Generalized weakness, History of stroke, and Hyponatremia were also pertinent to this visit. has a past medical history of B12 deficiency, CVA (cerebral vascular accident) ([facility] [address] CVD (cerebrovascular disease), and Hypertension. has no past surgical history on file. Restrictions  Restrictions/Precautions  Restrictions/Precautions: General Precautions, Fall Risk  Subjective   General  Chart Reviewed: Yes  Response To Previous Treatment: Patient reporting fatigue but able to participate. Family / Caregiver Present: Yes( present)  Subjective  Subjective: Pt denies any pain at this time. Pt agreeable for bed ex now and agreeable to get OOB after lunch. General Comment  Comments: Pt with noted increased alertness, holding some conversation with therapist.          Orientation  Orientation  Overall Orientation Status: Within Functional Limits  Cognition      Objective         Ambulation  Ambulation?: No  Ambulation 1  Comments: Pt agreeable to get OOB after lunch. Exercises  Straight Leg Raise: x15  Heelslides: x15  Hip Abduction: x15  Knee Short Arc Quad: x15  Ankle Pumps: x15  Comments: Pt completed above listed ex in supine position. Assist as needed d/t muscle fatigue.  Extended rest period required between each set. G-Code     OutComes Score    AM-PAC Score    Goals  Short term goals  Time Frame for Short term goals: 14 days  Short term goal 1: Pt to ambulate 30ft HHA with no LOB. Short term goal 2: Pt to tolerate 20-30mins ther ex/act for improved strength and endurance with ADL's. Short term goal 3: Pt to demonstrate good sitting balance. Short term goal 4: Pt to perform bed mob and transfers with min A +1. Patient Goals   Patient goals : none stated    Plan    Plan  Times per week: 7 x week  Times per day: Twice a day(daily on weekends)  Current Treatment Recommendations: Strengthening, Neuromuscular Re-education, Home Exercise Program, Safety Education & Training, Balance Training, Endurance Training, Patient/Caregiver Education & Training, Functional Mobility Training, Transfer Training, Gait Training, Stair training  Safety Devices  Type of devices:  All fall risk precautions in place, Call light within reach, Left in bed, Nurse notified     Therapy Time   Individual Concurrent Group Co-treatment   Time In 1100         Time Out 1125         Minutes Kristy [address]arrubias 78, PTA

## 2021-04-01 NOTE — PROGRESS NOTES
Progress Note    SUBJECTIVE/INTERVAL HISTORY:    Following up on weakness.  ambulating in room. She appears to be fatigued and frail. He states that she is taken very little intake and. Dr. Gannon Letters had discussion with him again regarding his lab work and her physical condition and ability to eat still remains poor. He does continue him to think about skilled care or assistance at home with her as at this time she is total care. OBJECTIVE:    Vitals:   Temp: 97.4 °F (36.3 °C)  Temp range:    Temp  Av.1 °F (36.7 °C)  Min: 97.4 °F (36.3 °C)  Max: 98.4 °F (36.9 °C)    BP: (!) 151/59  BP Range:      Systolic (52WZB), EQJ:600 , Min:144 , MH      Diastolic (40WQL), KCP:56, Min:59, Max:73    Pulse: 73  Pulse Range:    Pulse  Av.5  Min: 71  Max: 78    Resp: 16  Resp Range:   Resp  Avg: 15  Min: 14  Max: 16    SpO2: 94 % on supplemental O2  SpO2 range:   SpO2  Av.2 %  Min: 92 %  Max: 94 %    24HR INTAKE/OUTPUT:      Intake/Output Summary (Last 24 hours) at 2021 1538  Last data filed at 2021 2987  Gross per 24 hour   Intake 210 ml   Output 1000 ml   Net -790 ml       -----------------------------------------------------------------    Exam:  GEN: Fatigued but is oriented to person, place and time, frail, in no acute distress  EYES: No gross abnormalities. , PERRL and EOMI  NECK: normal, supple, no lymphadenopathy,  no carotid bruits  PULM: Expiratory wheeze throughout  COR: no murmurs, no gallops, irregularly irregular, S1 normal and S2 normal  ABD:  soft, non-tender, non-distended, normal bowel sounds, no masses or organomegaly  EXT:   no cyanosis, clubbing or edema present    NEURO: follows commands, RICHEY, no deficits  SKIN:  no rashes or significant lesions      -----------------------------------------------------------------  Diagnostic Data:  Lab Results   Component Value Date    WBC 7.2 2021    HGB 7.6 (L) 2021    HCT 26.0 (L) 2021    PLT See Reflexed IPF Result 04/01/2021    CHOL 64 10/16/2020    TRIG 47 10/16/2020    HDL 28 (L) 10/16/2020    ALT 8 03/26/2021    AST 13 03/26/2021     (L) 04/01/2021    K 3.4 (L) 04/01/2021    CL 98 04/01/2021    CREATININE 0.67 04/01/2021    BUN 12 04/01/2021    CO2 26 04/01/2021    TSH 2.24 03/28/2021    INR 1.3 03/25/2021    LABA1C 6.2 (H) 10/16/2020       ASSESSMENT:    Principal Problem:    Pneumonia due to organism suspect aspiration  Active Problems:    Essential hypertension    Anemia of chronic renal failure, stage 3 (moderate)    Hypokalemia    Severe malnutrition (HCC)    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 02/05/2021    Renal cell carcinoma of left kidney ([address] 02/04/2021    Anemia of chronic renal failure, stage 3 (moderate) 11/25/2020    Microcytic anemia 10/31/2020    Elevated ferritin 10/31/2020    Cerebral vascular disease 07/18/2018    Neuropathy 07/18/2018    Acute ischemic right middle cerebral artery (MCA) stroke (HCC)     Cerebrovascular accident (CVA) due to thrombosis of precerebral artery ([address] 02/06/2018    Hypertensive urgency 02/06/2018    LUE weakness 02/06/2018    Essential hypertension 02/06/2018    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 02/06/2018       PLAN:  Principal Problem: Pneumonia due to organism suspect aspiration  Active Problems:    Essential hypertension    Anemia of chronic renal failure, stage 3 (moderate)    Hypokalemia    Severe malnutrition (HCC)    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 *    · Acute respiratory distress  ? Resolved  ? Likely due to new onset atrial fibrillation  ? supplemental O2  · New onset atrial fibrillation with RVR  ? Continue metoprolol 25 mg twice daily  ? Cardiology consult  · Pneumonia  ? Continue IV Rocephin/Zithromax  ? Appreciate speech therapy for swallow eval  ? Barium swallow shows no aspiration  ? Suction as needed  · Dehydration  ? IVF'srestart IV fluids normal saline with 20 of KCl at 75 mL's per hour  ? Monitor renal function / lytes  · Anemia   ? Hb dropped from 8.3 to 6.6  ? S/p 1 u PRBC transfusion  - repeat Hb 7.9  ? Hemoglobin 7.6 today. We will not transfuse unless she is less than 7  ? CBC in the morning  ? Stool quaiacnegative  ? Continue vitamin B12  ? Appreciate oncology  · Generalized weakness  ? PT/OTmax assist  · Hypokalemia  ? 3.4 today  ? We will add 20 mEq of KCl to IV fluids  · Hyponatremia   ? Improved  ? Continue 1800 mL fluid restriction  ? Nephrology consult initiated  ? Concern for SIADH due to renal carcinoma  ? Stop lisinopril  ? Continue Norvasc 2.5 mg daily  · Moderate malnutrition   · Dietary consult  · DVT prophylaxis: Lovenox  · High risk medications:  None  · Discharge plan:  Tomorrow to home unless  agrees to STEVE Hernandez, NP-C  4/1/2021, 3:38 PM

## 2021-04-01 NOTE — PLAN OF CARE
Problem: Falls - Risk of:  Goal: Will remain free from falls  Outcome: Ongoing     Problem: Skin Integrity:  Goal: Absence of new skin breakdown  Outcome: Ongoing     Problem: Infection:  Goal: Will remain free from infection  Outcome: Ongoing     Problem: Pain:  Goal: Patient's pain/discomfort is manageable  Outcome: Ongoing     Problem: Discharge Planning:  Goal: Discharged to appropriate level of care  Outcome: Ongoing     Problem: Airway Clearance - Ineffective:  Goal: Clear lung sounds  Outcome: Ongoing     Problem: Airway Clearance - Ineffective:  Goal: Ability to maintain a clear airway will improve  Outcome: Ongoing     Problem: Fluid Volume - Deficit:  Goal: Achieves intake and output within specified parameters  Outcome: Ongoing     Problem: Gas Exchange - Impaired:  Goal: Levels of oxygenation will improve  Outcome: Ongoing     Problem: Hyperthermia:  Goal: Ability to maintain a body temperature in the normal range will improve  Outcome: Ongoing     Problem: Nutrition  Goal: Optimal nutrition therapy  Outcome: Ongoing     Problem: Musculor/Skeletal Functional Status  Goal: Highest potential functional level  Outcome: Ongoing

## 2021-04-01 NOTE — PROGRESS NOTES
Renal Progress Note  [facility]      TELEHEALTH EVALUATION -- Audio/Visual (During AYONE-58 public [facility] emergency)     Telehealth service was provided with the patient at her room in [address] and myself the physician in my office in [place], [place] and my the patient's RN, Paxton Steel, who has initiated the visit. Pursuant to the emergency declaration under the [address] [place] [address] [facility] authority and the [employer] Act, this Virtual  Visit was conducted, with patient's consent, to reduce the patient's risk of exposure to COVID-19 and provide continuity of care for an established patient. Services were provided through a video synchronous discussion virtually to substitute for in-person clinic visit. Patient :  Kian Barnes; 79 y.o. MRN# [id number]  Location:  0320/0320-01  Attending:  Facundo Evans MD  Admit Date:  3/25/2021   Hospital Day: 6      Subjective:     Nephrology is following the patient for hyponatremia. Patient was evaluated via video visit. Not tolerating UreNa packets very well she almost vomited.     Outpatient Medications:     Medications Prior to Admission: hydrOXYzine (ATARAX) 25 MG tablet, Take 25 mg by mouth nightly  pantoprazole (PROTONIX) 40 MG tablet, TAKE 1 TABLET BY MOUTH EVERY DAY BEFORE BREAKFAST  lisinopril (PRINIVIL;ZESTRIL) 10 MG tablet, Take 1 tablet by mouth 2 times daily  Cholecalciferol (VITAMIN D3) 125 MCG (5000 UT) TABS, Take 5,000 Units by mouth daily  Cyanocobalamin (VITAMIN B 12) 500 MCG TABS, Take 1 tablet by mouth 2 times daily   [DISCONTINUED] linezolid (ZYVOX) 600 MG tablet, Take 1 tablet by mouth 2 times daily for 14 days    Current Medications:     Scheduled Meds:    mirtazapine  7.5 mg Oral Nightly    potassium bicarb-citric acid  40 mEq Oral Once    [START ON 4/2/2021] urea  8 g Oral BID    amLODIPine  2.5 mg Oral Daily    Vitamin D 5,000 Units Oral Daily    metoprolol tartrate  25 mg Oral BID    sodium chloride  2 g Oral TID WC    IVPB builder  500 mg Intravenous Q24H    vitamin B-12  500 mcg Oral BID    hydrOXYzine  25 mg Oral Nightly    pantoprazole  40 mg Oral QAM AC    cefTRIAXone (ROCEPHIN) IV  1,000 mg Intravenous Q24H    sodium chloride flush  10 mL Intravenous 2 times per day    potassium chloride  40 mEq Oral Once    albuterol  2.5 mg Nebulization TID     Continuous Infusions:    0.9% NaCl with KCl 20 mEq 75 mL/hr at 21 1043    sodium chloride       PRN Meds:  sodium chloride, sodium chloride flush, promethazine **OR** ondansetron, polyethylene glycol, acetaminophen **OR** acetaminophen    Input/Output:       I/O last 3 completed shifts: In: 210 [P.O.:210]  Out: 1000 [Urine:1000]. Patient Vitals for the past 96 hrs (Last 3 readings):   Weight   21 0530 105 lb 1.6 oz (47.7 kg)       Vital Signs:   Temperature:  Temp: 98.4 °F (36.9 °C)  TMax:   Temp (24hrs), Av.3 °F (36.8 °C), Min:98.1 °F (36.7 °C), Max:98.4 °F (36.9 °C)    Respirations:  Resp: 16  Pulse:   Pulse: 73  BP:    BP: (!) 151/59  BP Range: Systolic (59EZP), OKJ:390 , Min:144 , GKM:110       Diastolic (44KFL), NSL:60, Min:59, Max:73      Physical Examination:     General -- awake, frail chronically ill , more alert  Oral Mucosa -- moist  Neck --  JVD - no  Extremities -- no edema,   CNS - awake and alert   Pschy - not agitated, mood and memory normal    Due to this being a TeleHealth encounter, evaluation of the following organ systems is limited: Vitals/EENT/Resp/CV/GI//MS/Neuro/Skin/Heme-Lymph-Imm.   Labs:       Recent Labs     21  0550 21  0530   WBC 7.0 7.2   RBC 2.99* 3.14*   HGB 7.3* 7.6*   HCT 24.0* 26.0*   MCV 80.3* 82.8   MCH 24.4* 24.2*   MCHC 30.4 29.2   RDW 19.1* 19.1*   PLT See Reflexed IPF Result See Reflexed IPF Result   MPV NOT REPORTED NOT REPORTED      BMP:   Recent Labs     21  0600 21  0600 21 TOTALPROTEIN, URINE   Urine Creatinine:   No results found for: LABCREA  Urine Eosinophils:  No components found for: UEOS        Impression and Plan:  1. 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 Finally better after Tolvaptan 15 mg on 3/30. Sodium stable at 131 on salt tabs 2 grams TID. Added UreNA 15 grams daily pt didn't tolerate well , will split into bid and see if that is better    2. Hypokalemia: effer-K 40 meq once  3. Anemia s/p blood transfusion  4. Thrombocytopenia , worsening  5. Metastatic renal cell carcinoma:  6. Severe hypoalbuminemia  7. Hypocalcemia which corrects for hypoalbuminemia  8. New onset Afib  9. Aspiration pneumonia  10. Deconditioning    Overall prognosis is guarded. Please don't hesitate to call with any questions.   Electronically signed by Albert Guido DO on 4/1/2021 at 3:25 PM

## 2021-04-01 NOTE — PROGRESS NOTES
Per the CNP the  wants the patient to go to [facility] to become stronger before going home. Referral made and paper work fax to SNF.   GIOVANY Zepeda

## 2021-04-01 NOTE — PROGRESS NOTES
When I explain to the  that [facility] can not accept her. He stated very firmly \"then I will take her home. I'm not sticking her in a nursing home. \" Explain I will let the CNP aware of his decision.     GIOVANY Wu

## 2021-04-01 NOTE — PROGRESS NOTES
Physical Therapy  Facility/Department: [facility] AT THE [facility] MED SURG  Daily Treatment Note  NAME: Mary Ellen Mcghee  : 1940  MRN: [id number]    Date of Service: 2021    Discharge Recommendations:  Continue to assess pending progress, Patient would benefit from continued therapy after discharge        Assessment   Treatment Diagnosis: general weakness; difficulty walking  Prognosis: Good;Fair  PT Education: Goals;PT Role;Transfer Training  Patient Education: Pt educated on above with appropriate understanding. REQUIRES PT FOLLOW UP: Yes  Activity Tolerance  Activity Tolerance: Patient limited by fatigue  Activity Tolerance: Extreme fatigue     Patient Diagnosis(es): The primary encounter diagnosis was Pneumonia due to organism. Diagnoses of Acute cystitis without hematuria, Generalized weakness, History of stroke, and Hyponatremia were also pertinent to this visit. has a past medical history of B12 deficiency, CVA (cerebral vascular accident) ([address] CVD (cerebrovascular disease), and Hypertension. has no past surgical history on file. Restrictions  Restrictions/Precautions  Restrictions/Precautions: General Precautions, Fall Risk  Subjective   General  Chart Reviewed: Yes  Response To Previous Treatment: Patient reporting fatigue but able to participate. Family / Caregiver Present: Yes( present)  Subjective  Subjective: Pt denies any pain at this time. Pt agreeable to get OOB. General Comment  Comments: Reported right side lean along with head pulled to right to LEXI Cook is aware. Orientation  Orientation  Overall Orientation Status: Within Functional Limits  Cognition      Objective   Bed mobility  Supine to Sit: 2 Person assistance;Maximum assistance  Scooting: Maximal assistance;2 Person assistance  Comment: Pt unable to maintain sitting balance EOB and demonstrates significant right side lateral lean along with head turned to right, no righting response noted.   Transfers  Sit to

## 2021-04-01 NOTE — PROGRESS NOTES
Occupational Therapy  Facility/Department: [facility] AT THE [facility] MED SURG  Daily Treatment Note  NAME: Kian Barens  : 1940  MRN: [id number]    Date of Service: 2021    Discharge Recommendations:  Continue to assess pending progress, Subacute/Skilled Nursing Facility, Home with Home health OT       Assessment      OT Education: Transfer Training;OT Role  Activity Tolerance  Activity Tolerance: Patient limited by fatigue  Activity Tolerance: Pt demonstrates R side lateral lean throughout all tasks this date (seated, standing). NSG notified. Safety Devices  Type of devices: Left in chair;Call light within reach;Nurse notified         Patient Diagnosis(es): The primary encounter diagnosis was Pneumonia due to organism. Diagnoses of Acute cystitis without hematuria, Generalized weakness, History of stroke, and Hyponatremia were also pertinent to this visit. has a past medical history of B12 deficiency, CVA (cerebral vascular accident) ([address] CVD (cerebrovascular disease), and Hypertension. has no past surgical history on file. Restrictions  Restrictions/Precautions  Restrictions/Precautions: General Precautions, Fall Risk  Subjective   General  Chart Reviewed: Yes  Patient assessed for rehabilitation services?: Yes  Response to previous treatment: Patient with no complaints from previous session  Family / Caregiver Present: Yes  Referring Practitioner: Georgeanna Siemens, MD  Diagnosis: Pnemonia  Subjective  Subjective: Pt with no reports of pain at time of session. General Comment  Comments: Pt agreeable to use of BSC upon therapists arrival this date. Pt agreeable to tf to bedside chair following ADL tasks.       Orientation     Objective    ADL  Toileting: Dependent/Total(Pt requires total assistance for hygiene while in stand at bedside commode.)        Balance  Sitting Balance: Maximum assistance(x2.)  Standing Balance: Maximum assistance(x2.)  Functional Mobility  Activity: (Bedside commode to bedside chair.)  Assist Level: Maximum assistance(x2.)  Functional Mobility Comments: Pt requires vcs for safety in hand placement when approaching all surfaces. Bed mobility  Supine to Sit: 2 Person assistance;Maximum assistance  Scooting: Maximal assistance;2 Person assistance  Transfers  Stand Step Transfers: 2 Person assistance  Stand Pivot Transfers: Maximum assistance;2 Person assistance  Sit to stand: Maximum assistance;2 Person assistance  Stand to sit: 2 Person assistance;Maximum assistance  Transfer Comments: Pt requires vcs for hand placement. Plan   Plan  Times per week: 7  Times per day: Daily  Current Treatment Recommendations: Safety Education & Training, Patient/Caregiver Education & Training, Self-Care / ADL, Balance Training, Functional Mobility Training, Endurance Training, Strengthening, ROM  G-Code     OutComes Score                                                  AM-PAC Score             Goals  Short term goals  Time Frame for Short term goals: 20 visits  Short term goal 1: Patient will complete transfers with mod A to increase independence with functional mobility. Short term goal 2: Patient will complete bed mobility with min A to return to PLOF. Short term goal 3: Patient will tolerate 5 minute self-care activity sitting EOB with min A to increase independence with self-care.        Therapy Time   Individual Concurrent Group Co-treatment   Time In 1325         Time Out 1348         Minutes [address] [place], [place]

## 2021-04-02 VITALS
OXYGEN SATURATION: 94 % | HEART RATE: 79 BPM | HEIGHT: 65 IN | DIASTOLIC BLOOD PRESSURE: 67 MMHG | RESPIRATION RATE: 16 BRPM | TEMPERATURE: 99.2 F | SYSTOLIC BLOOD PRESSURE: 163 MMHG | WEIGHT: 105.1 LBS | BODY MASS INDEX: 17.51 KG/M2

## 2021-04-02 LAB
ABSOLUTE EOS #: 0.07 K/UL (ref 0–0.44)
ABSOLUTE IMMATURE GRANULOCYTE: 0 K/UL (ref 0–0.3)
ABSOLUTE LYMPH #: 0.43 K/UL (ref 1.1–3.7)
ABSOLUTE MONO #: 0.14 K/UL (ref 0.1–1.2)
ANION GAP SERPL CALCULATED.3IONS-SCNC: 7 MMOL/L (ref 9–17)
BASOPHILS # BLD: 0 % (ref 0–2)
BASOPHILS ABSOLUTE: 0 K/UL (ref 0–0.2)
BUN BLDV-MCNC: 20 MG/DL (ref 8–23)
BUN/CREAT BLD: 34 (ref 9–20)
CALCIUM SERPL-MCNC: 8.1 MG/DL (ref 8.6–10.4)
CHLORIDE BLD-SCNC: 101 MMOL/L (ref 98–107)
CO2: 24 MMOL/L (ref 20–31)
CREAT SERPL-MCNC: 0.58 MG/DL (ref 0.5–0.9)
DIFFERENTIAL TYPE: ABNORMAL
EOSINOPHILS RELATIVE PERCENT: 1 % (ref 1–4)
GFR AFRICAN AMERICAN: >60 ML/MIN
GFR NON-AFRICAN AMERICAN: >60 ML/MIN
GFR SERPL CREATININE-BSD FRML MDRD: ABNORMAL ML/MIN/{1.73_M2}
GFR SERPL CREATININE-BSD FRML MDRD: ABNORMAL ML/MIN/{1.73_M2}
GLUCOSE BLD-MCNC: 105 MG/DL (ref 70–99)
HCT VFR BLD CALC: 24.7 % (ref 36.3–47.1)
HEMOGLOBIN: 7.4 G/DL (ref 11.9–15.1)
IMMATURE GRANULOCYTES: 0 %
LYMPHOCYTES # BLD: 6 % (ref 24–43)
MCH RBC QN AUTO: 24.8 PG (ref 25.2–33.5)
MCHC RBC AUTO-ENTMCNC: 30 G/DL (ref 28.4–34.8)
MCV RBC AUTO: 82.9 FL (ref 82.6–102.9)
MONOCYTES # BLD: 2 % (ref 3–12)
MORPHOLOGY: NORMAL
NRBC AUTOMATED: 0 PER 100 WBC
PDW BLD-RTO: 19 % (ref 11.8–14.4)
PLATELET # BLD: ABNORMAL K/UL (ref 138–453)
PLATELET ESTIMATE: ABNORMAL
PLATELET, FLUORESCENCE: 41 K/UL (ref 138–453)
PLATELET, IMMATURE FRACTION: 6.2 % (ref 1.1–10.3)
PMV BLD AUTO: ABNORMAL FL (ref 8.1–13.5)
POTASSIUM SERPL-SCNC: 3.8 MMOL/L (ref 3.7–5.3)
RBC # BLD: 2.98 M/UL (ref 3.95–5.11)
RBC # BLD: ABNORMAL 10*6/UL
SARS-COV-2, RAPID: NOT DETECTED
SEG NEUTROPHILS: 91 % (ref 36–65)
SEGMENTED NEUTROPHILS ABSOLUTE COUNT: 6.46 K/UL (ref 1.5–8.1)
SODIUM BLD-SCNC: 132 MMOL/L (ref 135–144)
SPECIMEN DESCRIPTION: NORMAL
WBC # BLD: 7.1 K/UL (ref 3.5–11.3)
WBC # BLD: ABNORMAL 10*3/UL

## 2021-04-02 PROCEDURE — 36415 COLL VENOUS BLD VENIPUNCTURE: CPT

## 2021-04-02 PROCEDURE — 6370000000 HC RX 637 (ALT 250 FOR IP): Performed by: NURSE PRACTITIONER

## 2021-04-02 PROCEDURE — C9803 HOPD COVID-19 SPEC COLLECT: HCPCS

## 2021-04-02 PROCEDURE — 6370000000 HC RX 637 (ALT 250 FOR IP): Performed by: INTERNAL MEDICINE

## 2021-04-02 PROCEDURE — 97535 SELF CARE MNGMENT TRAINING: CPT

## 2021-04-02 PROCEDURE — 97110 THERAPEUTIC EXERCISES: CPT

## 2021-04-02 PROCEDURE — 94640 AIRWAY INHALATION TREATMENT: CPT

## 2021-04-02 PROCEDURE — 80048 BASIC METABOLIC PNL TOTAL CA: CPT

## 2021-04-02 PROCEDURE — 6360000002 HC RX W HCPCS: Performed by: NURSE PRACTITIONER

## 2021-04-02 PROCEDURE — 85055 RETICULATED PLATELET ASSAY: CPT

## 2021-04-02 PROCEDURE — 87635 SARS-COV-2 COVID-19 AMP PRB: CPT

## 2021-04-02 PROCEDURE — 99232 SBSQ HOSP IP/OBS MODERATE 35: CPT | Performed by: INTERNAL MEDICINE

## 2021-04-02 PROCEDURE — 94761 N-INVAS EAR/PLS OXIMETRY MLT: CPT

## 2021-04-02 PROCEDURE — 94664 DEMO&/EVAL PT USE INHALER: CPT

## 2021-04-02 PROCEDURE — 85025 COMPLETE CBC W/AUTO DIFF WBC: CPT

## 2021-04-02 RX ORDER — MIRTAZAPINE 7.5 MG/1
7.5 TABLET, FILM COATED ORAL NIGHTLY
Qty: 30 TABLET | Refills: 3 | DISCHARGE
Start: 2021-04-02 | End: 2021-04-07

## 2021-04-02 RX ORDER — CEPHALEXIN 500 MG/1
500 CAPSULE ORAL 2 TIMES DAILY
Qty: 14 CAPSULE | Refills: 0 | DISCHARGE
Start: 2021-04-02 | End: 2021-04-09

## 2021-04-02 RX ORDER — AMLODIPINE BESYLATE 2.5 MG/1
2.5 TABLET ORAL DAILY
Qty: 30 TABLET | Refills: 3 | DISCHARGE
Start: 2021-04-03

## 2021-04-02 RX ADMIN — SODIUM CHLORIDE TAB 1 GM 2 G: 1 TAB at 09:25

## 2021-04-02 RX ADMIN — SODIUM CHLORIDE TAB 1 GM 2 G: 1 TAB at 12:12

## 2021-04-02 RX ADMIN — Medication 8 G: at 09:35

## 2021-04-02 RX ADMIN — PANTOPRAZOLE SODIUM 40 MG: 40 TABLET, DELAYED RELEASE ORAL at 09:25

## 2021-04-02 RX ADMIN — SODIUM CHLORIDE AND POTASSIUM CHLORIDE: .9; .15 SOLUTION INTRAVENOUS at 01:26

## 2021-04-02 RX ADMIN — Medication 5000 UNITS: at 09:35

## 2021-04-02 RX ADMIN — CYANOCOBALAMIN TAB 1000 MCG 500 MCG: 1000 TAB at 09:25

## 2021-04-02 RX ADMIN — ALBUTEROL SULFATE 2.5 MG: 2.5 SOLUTION RESPIRATORY (INHALATION) at 07:07

## 2021-04-02 RX ADMIN — AMLODIPINE BESYLATE 2.5 MG: 2.5 TABLET ORAL at 09:25

## 2021-04-02 RX ADMIN — METOPROLOL TARTRATE 25 MG: 25 TABLET, FILM COATED ORAL at 09:25

## 2021-04-02 ASSESSMENT — PAIN SCALES - GENERAL
PAINLEVEL_OUTOF10: 0
PAINLEVEL_OUTOF10: 0

## 2021-04-02 NOTE — DISCHARGE SUMMARY
Discharge Summary    Paul Terry  :  1940  MRN:  [id number]    Admit date:  3/25/2021      Discharge date: 2021     Admitting Physician:  Kenzie Wang MD    Discharge Diagnoses:    Principal Problem:    Pneumonia due to organism suspect aspiration  Active Problems:    Essential hypertension    Anemia of chronic renal failure, stage 3 (moderate)    Hypokalemia    Severe malnutrition (HCC)    Hyponatremia likely SIADH    PAF (paroxysmal atrial fibrillation) (HCC)    Type 2 MI (myocardial infarction) ([facility] [address]  Resolved Problems:    * No resolved hospital problems. [facility] Course: Paul Terry is a 79 y.o. female admitted with pneumonia. She presented to the emergency room from home due to increased fatigue and weakness.  stated that she has had a decline in strength for the last couple days. Patient continues to demonstrate diffuse weakness but is noted to have a baseline weakness on the left lower extremities from a previous stroke. Her weakness has been progressing slowly. She recently was treated for a UTI and was on Zyvox for infection however this is completed. They did explain that she had some cough with congestion. Denied fever or chills. Denies pain. Does not demonstrate pain upon assessment. She continues to be slow to respond but is alert. Patient has had a significant weight loss of approximately 50 pounds since the beginning of the year and is currently only taking mostly supplements for meals as she has not been able to eat solid foods due to nausea and vomiting as she gags when she eats. She does take approximately 1-2 shakes per day if that. She does have a history of renal carcinoma in the left kidney and is doing immunotherapy. She was diagnosed in 2020. They discussed surgery to remove the cancer at the [facility] however they felt that it was too high risk and was not completed.   She has completed 2 infusions for immunotherapy. She was admitted and provided IV fluids for hydration and her electrolytes have been stabilized. Several conversations took place with the spouse as well as the patient concerning her poor prognosis due to continued weakness and inability to take in enough nourishment and her continued decline. At this point he is not open to hospice care however he does want to send her to skilled care for some continued therapy to see if she has some improvement with that. She will be discharged home today to the [facility] for skilled therapy I will give her antibiotics for 7 more days. Consultants:  Dr. Sergio Emanuel, heme, onc    Procedures: none    Complications: none    Discharge Condition: poor    Exam:  GEN: Fatigued but is oriented to person, place and time, frail, in no acute distress  EYES:  No gross abnormalities. , PERRL and EOMI  NECK: normal, supple, no lymphadenopathy,  no carotid bruits  PULM: Expiratory wheeze throughout  COR:   no murmurs, no gallops, irregularly irregular, S1 normal and S2 normal  ABD:    soft, non-tender, non-distended, normal bowel sounds, no masses or organomegaly  EXT:    no cyanosis, clubbing or edema present    NEURO: follows commands, RICHEY, no deficits  SKIN:   no rashes or significant lesions    Significant Diagnostic Studies:   Lab Results   Component Value Date    WBC 7.1 04/02/2021    HGB 7.4 (L) 04/02/2021    PLT See Reflexed IPF Result 04/02/2021       Lab Results   Component Value Date    BUN 20 04/02/2021    CREATININE 0.58 04/02/2021     (L) 04/02/2021    K 3.8 04/02/2021    CALCIUM 8.1 (L) 04/02/2021     04/02/2021    CO2 24 04/02/2021    LABGLOM >60 04/02/2021       Lab Results   Component Value Date    WBCUA 0 TO 2 03/25/2021    RBCUA 0 TO 2 03/25/2021    EPITHUA 0 TO 2 03/25/2021    LEUKOCYTESUR NEGATIVE 03/25/2021    SPECGRAV <1.005 (L) 03/25/2021    GLUCOSEU NEGATIVE 03/25/2021    KETUA NEGATIVE 03/25/2021    PROTEINU NEGATIVE 03/25/2021    HGBUR 1+ (A) 03/25/2021    CASTUA NOT REPORTED 03/25/2021    CRYSTUA NOT REPORTED 03/25/2021    BACTERIA TRACE (A) 03/25/2021    YEAST NOT REPORTED 03/25/2021       Echo-2d-m-mode Complete    Result Date: 3/29/2021  [address] [place] [address] Transthoracic Echocardiography Report (TTE)  Patient Name [facility]  Date of Study               03/29/2021               Olya Robb   Date of      1940  Gender                      Female  Birth   Age          79 year(s)  Race                           Room Number  0320        Height:                     65 inch, 165.1 cm   Corporate ID [id number]    Weight:                     105 pounds, 47.6 kg  #   Patient Acct [de-identified]   BSA:          1.5 m^2       BMI:      17.47  #                                                              kg/m^2   MR #         [id number]      Sonographer                 ElodiaVenice   Accession #  [id number]  Interpreting Physician      [address]   Fellow                   Referring Nurse                           Practitioner   Interpreting             Referring Physician         [address] [place], [place] [address]irene  Fellow  Type of Study   TTE procedure:2D Echocardiogram, M-Mode, Doppler, Color Doppler. Procedure Date Date: 03/29/2021 Start: 02:44 PM Study Location: [facility] Indications:Elevated troponin. History / Tech. Comments: Elevated troponin, SOB PMHX: HTN, CVA Patient Status: Inpatient Height: 65 inches Weight: 105 pounds BSA: 1.5 m^2 BMI: 17.47 kg/m^2 BP: 150/50 mmHg CONCLUSIONS Summary Global left ventricular systolic function appears preserved with an estimated ejection fraction of 55%. The left ventricular cavity size is within normal limits and the left ventricular wall thickness is within normal limits. No definite specific wall motion abnormalities were identified. The left atrium is moderately dilated (34-39) with a left atrial volume index of 36 ml/m2. Aortic leaflets show calcification without restriction of motion.  Trivial aortic insufficiency. Mitral annular calcification is seen. Moderate mitral regurgitation. A pleural effusion was seen. Evidence of mild (grade I) diastolic dysfunction is seen. Compared to the previous study of 2/7/18, the patient now has moderate mitral regurgitation. Signature ----------------------------------------------------------------------------  Electronically signed by Venice Clifton(Sonographer) on 03/29/2021 03:50  PM ---------------------------------------------------------------------------- ----------------------------------------------------------------------------  Electronically signed by Richmond Anderson(Interpreting physician) on  03/29/2021 05:15 PM ---------------------------------------------------------------------------- FINDINGS Left Atrium The left atrium is moderately dilated (34-39) with a left atrial volume index of 36 ml/m2. Left Ventricle Global left ventricular systolic function appears preserved with an estimated ejection fraction of 55%. The left ventricular cavity size is within normal limits and the left ventricular wall thickness is within normal limits. No definite specific wall motion abnormalities were identified. Right Atrium Right atrium is normal in size. Right Ventricle Normal right ventricular size and function. Mitral Valve Mitral annular calcification is seen. Moderate mitral regurgitation. Aortic Valve Aortic leaflets show calcification without restriction of motion. Trivial aortic insufficiency. Tricuspid Valve Normal tricuspid valve structure with mild tricuspid regurgitation. Pulmonic Valve Technically difficult visualization of the pulmonic valve, no abnormality seen. Pericardial Effusion No significant pericardial effusion is seen. Pleural Effusion A pleural effusion was seen. Miscellaneous Evidence of mild (grade I) diastolic dysfunction is seen. Normal aortic root dimension.  M-mode / 2D Measurements & Calculations:   LVIDd:4.13 cm(3.7 - 5.6 cm)      Diastolic Volume:70.46 ml  LVIDs:3.04 cm(2.2 - 4.0 cm)      Systolic HFWOLK:75.53 ml  IVSd:1.03 cm(0.6 - 1.1 cm)       Aortic Root:3.29 cm(2.0 - 3.7 cm)  LVPWd:1.05 cm(0.6 - 1.1 cm)      LA Dimension: 4.8 cm(1.9 - 4.0 cm)  Fractional Shortenin.39 %    LA volume/Index: 54.3 ml /36m^2  Calculated LVEF (%): 60.25 %     AV Cusp Separation: 1.59 cm   Mitral:                                 Aortic   Valve Area (P1/2-Time): 3.07 cm^2       Peak Velocity: 1.53 m/s  Peak E-Wave: 0.98 m/s                   Mean Velocity: 0.99 m/s  Peak A-Wave: 0.98 m/s                   Peak Gradient: 9.39 mmHg  E/A Ratio: 1.01                         Mean Gradient: 4.66 mmHg  Peak Gradient: 3.87 mmHg                                          Acceleration Time: 70.45 msec  P1/2t: 71.57 msec                                          AV VTI: 33.16 cm   Tricuspid:                              Pulmonic:   Estimated RVSP: 38.19 mmHg  Peak TR Velocity: 2.97 m/s  Peak TR Gradient: 35.74037 mmHg  Estimated RA Pressure: 3 mmHg                                          Estimated PASP: 38.19 mmHg  Diastology / Tissue Doppler Lateral Wall E' velocity:0.10 m/s Lateral Wall E/E':9.7      Assessment and Plan:  Patient Active Problem List    Diagnosis Date Noted    PAF (paroxysmal atrial fibrillation) (HCC)     Type 2 [facility] infarction) [facility] [address]     Pneumonia due to organism suspect aspiration 2021    Hypokalemia 2021    H/O: CVA (cerebrovascular accident) 2021    Severe malnutrition ([address] 2021    Renal cell carcinoma ([facility] [address] 2021    Acute cystitis without hematuria 2021    Dehydration 2021    Anemia due to chemotherapy 2021    Hyponatremia likely SIADH 2021    Renal cell carcinoma of left kidney ([facility] [address] 2021    Anemia of chronic renal failure, stage 3 (moderate) 2020    Microcytic anemia 10/31/2020    Elevated ferritin 10/31/2020    Cerebral vascular disease 2018    Neuropathy 07/18/2018    Acute ischemic right middle cerebral artery (MCA) stroke (HCC)     Cerebrovascular accident (CVA) due to thrombosis of precerebral artery ([address] 02/06/2018    Hypertensive urgency 02/06/2018    LUE weakness 02/06/2018    Essential hypertension 02/06/2018    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 02/06/2018        Discharge Medications:       Monik Kareem \"Brandy\"   Home Medication Instructions [id number]    Printed on:04/02/21 1317   Medication Information                      amLODIPine (NORVASC) 2.5 MG tablet  Take 1 tablet by mouth daily             cephALEXin (KEFLEX) 500 MG capsule  Take 1 capsule by mouth 2 times daily for 7 days             Cholecalciferol (VITAMIN D3) 125 MCG (5000 UT) TABS  Take 5,000 Units by mouth daily             Cyanocobalamin (VITAMIN B 12) 500 MCG TABS  Take 1 tablet by mouth 2 times daily              hydrOXYzine (ATARAX) 25 MG tablet  Take 25 mg by mouth nightly             metoprolol tartrate (LOPRESSOR) 25 MG tablet  Take 1 tablet by mouth 2 times daily             mirtazapine (REMERON) 7.5 MG tablet  Take 1 tablet by mouth nightly             pantoprazole (PROTONIX) 40 MG tablet  TAKE 1 TABLET BY MOUTH EVERY DAY BEFORE BREAKFAST             urea (URE-NA) 15 g PACK packet  Take 8 g by mouth 2 times daily                 Patient Instructions:    Activity: activity as tolerated  Diet: regular diet and encourage fluids  Wound Care: none needed  Other: BMP in 1 week    Disposition:   DC to [address] [place] [address]    Follow up:  Patient will be followed by STEVE Santa CNP in 1-2 weeks    CORE MEASURES on Discharge (if applicable)  ACE/ARB in CHF: NA  Statin in MI: NA  ASA in MI: NA  Statin in CVA: NA  Antiplatelet in CVA: NA    Total time spent on discharge services: 40 minutes    Including the following activities:  Evaluation and Management of patient  Discussion with patient and/or surrogate about current care plan  Coordination with Case Management and/or   Coordination of care with Consultants (if applicable)   Coordination of care with Receiving Facility Physician (if applicable)  Completion of DME forms (if applicable)  Preparation of Discharge Summary  Preparation of Medication Reconciliation  Preparation of Discharge Prescriptions    Signed:  STEVE Gomes, NP-C  4/2/2021, 1:20 PM

## 2021-04-02 NOTE — PROGRESS NOTES
At bedside with patient, vitals obtained, assessment done, white board updated. Patient  informed writer that he did not want patient to have her Remeron tonight because Milena Johnston is allergic to it and threw up violently last time she took it. \" Documented that family refused drug at this time.

## 2021-04-02 NOTE — PROGRESS NOTES
Physical Therapy  Facility/Department: [facility]  Daily Treatment Note  NAME: Andrew Harvey  : 1940  MRN: [id number]    Date of Service: 2021    Discharge Recommendations:  Continue to assess pending progress, Patient would benefit from continued therapy after discharge      Assessment   Assessment: Pt with increased fatigue this session. Pt denied transfers d/t being very tired. WIll cont this afternoon. Treatment Diagnosis: general weakness; difficulty walking  Patient Education: Benefits of ex. REQUIRES PT FOLLOW UP: Yes  Activity Tolerance  Activity Tolerance: Patient limited by fatigue     Patient Diagnosis(es): The primary encounter diagnosis was Pneumonia due to organism. Diagnoses of Acute cystitis without hematuria, Generalized weakness, History of stroke, and Hyponatremia were also pertinent to this visit. has a past medical history of B12 deficiency, CVA (cerebral vascular accident) ([address] CVD (cerebrovascular disease), and Hypertension. has no past surgical history on file. Restrictions  Restrictions/Precautions  Restrictions/Precautions: General Precautions, Fall Risk  Subjective   General  Chart Reviewed: Yes  Response To Previous Treatment: Patient reporting fatigue but able to participate. Family / Caregiver Present: Yes  Subjective  Subjective: Pt denies pain, agrees to try some ex but tired. Pt  states she is very weak but up to try something. Orientation  Orientation  Overall Orientation Status: Within Functional Limits  Objective      Exercises  Straight Leg Raise: x15  Hamstring Sets: 15x  Quad Sets: 15x  Heelslides: x15  Hip Flexion: 15x  Hip Abduction: x15  Knee Short Arc Quad: x15  Ankle Pumps: x15  Comments: ex completed in supine this session. Rest breaks and AAROM as needed. Goals  Short term goals  Time Frame for Short term goals: 14 days  Short term goal 1: Pt to ambulate 30ft HHA with no LOB.   Short term goal 2: Pt to tolerate 20-30mins ther ex/act for improved strength and endurance with ADL's. Short term goal 3: Pt to demonstrate good sitting balance. Short term goal 4: Pt to perform bed mob and transfers with min A +1. Patient Goals   Patient goals : none stated    Plan    Plan  Times per week: 7 x week  Times per day: Twice a day(daily on weekends)  Current Treatment Recommendations: Strengthening, Neuromuscular Re-education, Home Exercise Program, Safety Education & Training, Balance Training, Endurance Training, Patient/Caregiver Education & Training, Functional Mobility Training, Transfer Training, Gait Training, Stair training  Safety Devices  Type of devices:  All fall risk precautions in place, Call light within reach, Gait belt, Nurse notified, Left in bed     Therapy Time   Individual Concurrent Group Co-treatment   Time In 0823         Time Out 0841         Minutes 18         Timed Code Treatment Minutes: Kenroy Shipley

## 2021-04-02 NOTE — PROGRESS NOTES
Report called to [facility] at the [facility]; aware that patient is scheduled for p/u at 1530. Questions answered.

## 2021-04-02 NOTE — PLAN OF CARE
Problem: Falls - Risk of:  Goal: Will remain free from falls  Description: Will remain free from falls  4/2/2021 0240 by Tuan Mayer RN  Outcome: Ongoing  Note: Fall assessment completed daily. Bed in lowest position. Call light within reach. Falling star posted to outside of door. Fall risk sticker in place on ID band. Side rails up 2/4. Bed alarm on for safety. Patient does not ambulate well and is mostly bedridden. Will continue to monitor. Problem: Falls - Risk of:  Goal: Absence of physical injury  Description: Absence of physical injury  Outcome: Ongoing  Note: Patient free from physical injury at this time. Problem: Skin Integrity:  Goal: Will show no infection signs and symptoms  Description: Will show no infection signs and symptoms  Outcome: Ongoing  Note: Patient showing no signs or symptoms of infection. Will continue to monitor. Problem: Skin Integrity:  Goal: Absence of new skin breakdown  Description: Absence of new skin breakdown  4/2/2021 0240 by Tuan Mayer RN  Outcome: Ongoing  Note: Skin assessment performed, no new breakdown noted at this time. Patient skin is dry and intact with the exception of the redness on her coccyx and buttocks. Will continue to monitor for further redness or breakdown. Problem: Infection:  Goal: Will remain free from infection  Description: Will remain free from infection  4/2/2021 0240 by Tuan Mayer RN  Outcome: Ongoing  Note: Patient afebrile, VS are WNL, she is alert and oriented to self and situation. She is getting rocephin and zithromax for infection management. Will continue to monitor. Problem: Safety:  Goal: Free from accidental physical injury  Description: Free from accidental physical injury  Outcome: Ongoing  Note: Patient free from accidental injury at this time.      Problem: Safety:  Goal: Free from intentional harm  Description: Free from intentional harm  Outcome: Ongoing  Note: Patient continues to be free parameters  4/2/2021 0240 by Juan Cummings RN  Outcome: Ongoing  Note: Patient only eating bites of pudding with morning medications and drinking enough sips of water to swallow small pills or to wet her mouth at this time. Continue to encourage nutrition within patient capabilities. Patient alert and oriented enough to verbalize when she is hungry and thirsty to nursing staff. Will educate  that patient is capable of telling him when she wants or doesn't want food/drink. Problem: Gas Exchange - Impaired:  Goal: Levels of oxygenation will improve  Description: Levels of oxygenation will improve  4/2/2021 0240 by Juan Cummings RN  Outcome: Ongoing  Note: Patient spo2 is WNL at this time. Will continue to monitor. Problem: Hyperthermia:  Goal: Ability to maintain a body temperature in the normal range will improve  Description: Ability to maintain a body temperature in the normal range will improve  4/2/2021 0240 by Juan Cummings RN  Outcome: Ongoing  Note: Patient is afebrile at this time. Will continue to monitor.       Problem: Tobacco Use:  Goal: Will participate in inpatient tobacco-use cessation counseling  Description: Will participate in inpatient tobacco-use cessation counseling  Outcome: Ongoing     Problem: Nutrition  Goal: Optimal nutrition therapy  Description: Nutrition Problem #1: Moderate malnutrition, In context of chronic illness  Intervention: Food and/or Nutrient Delivery: Continue Oral Nutrition Supplement, Continue Current Diet  Nutritional Goals: Food and fluid for comfort if hospice chosen    4/2/2021 0240 by Juan Cummings RN  Outcome: Ongoing     Problem: Musculor/Skeletal Functional Status  Goal: Highest potential functional level  4/2/2021 0240 by Juan Cummings RN  Outcome: Ongoing

## 2021-04-02 NOTE — CARE COORDINATION
Readmission Assessment  Number of Days since last admission?: 8-30 days  Previous Disposition: Home with Family  Who is being Interviewed: Caregiver  What was the patient's/caregiver's perception as to why they think they needed to return back to the hospital?: (Pt remained weak, has cough, not eating)  Did you visit your Primary Care Physician after you left the hospital, before you returned this time?: Yes(follow up phonecall)  Did you see a specialist, such as Cardiac, Pulmonary, Orthopedic Physician, etc. after you left the hospital?: Yes(nephrology)  Who advised the patient to return to the hospital?: Caregiver  Does the patient report anything that got in the way of taking their medications?: No  In our efforts to provide the best possible care to you and others like you, can you think of anything that we could have done to help you after you left the hospital the first time, so that you might not have needed to return so soon?: Other (Comment)(Pt continued to have increased weakness, not eating, brought back to hospital)

## 2021-04-02 NOTE — DISCHARGE INSTR - COC
Continuity of Care Form    Patient Name: Nevaeh Catalan   :  1940  MRN:  [id number]    Admit date:  3/25/2021  Discharge date: 2021    Code Status Order: DNR-CCA   Advance Directives:   [facility] Documentation     Date/Time Healthcare Directive Type of Healthcare Directive Copy in [facility] [address] Agent's Name Healthcare Agent's Phone Number    21 0123  No, patient does not have an advance directive for healthcare treatment -- -- -- -- --          Admitting Physician:  Todd Barthel, MD  PCP: STEVE Jaramillo CNP    Discharging Nurse: [facility] Unit/Room#: 0320/0320-01  Discharging Unit Phone Number: [phone]    Emergency Contact:   Extended Emergency Contact Information  Primary Emergency Contact: Amelia Jaquez [address] Phone: [phone]  Mobile Phone: [phone]  Relation: Spouse   needed? No  Secondary Emergency Contact: Chivo Orr   [address] Phone: [phone]  Relation: Child   needed? No    Past Surgical History:  No past surgical history on file.     Immunization History:   Immunization History   Administered Date(s) Administered    Influenza, High Dose (Fluzone 65 yrs and older) 10/10/2018, 2019    Influenza, High-dose, Quadv, 72 yrs +, IM (Fluzone) 10/15/2020    Pneumococcal Conjugate 13-valent (Eldonna Mort) 2019       Active Problems:  Patient Active Problem List   Diagnosis Code    Cerebrovascular accident (CVA) due to thrombosis of precerebral artery ([facility] [address] I63.00    Hypertensive urgency I16.0    LUE weakness R29.898    Essential hypertension I10    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 I66.01    Acute ischemic right middle cerebral artery (MCA) stroke ([facility]) I63.511    Cerebral vascular disease I67.9    Neuropathy G62.9    Microcytic anemia D50.9  Elevated ferritin R79.89    Anemia of chronic renal failure, stage 3 (moderate) N18.30, D63.1    Renal cell carcinoma of left kidney (HCC) C64.2    Renal cell carcinoma (HCC) C64.9    Acute cystitis without hematuria N30.00    Dehydration E86.0    Anemia due to chemotherapy D64.81, T45.1X5A    Hypokalemia E87.6    H/O: CVA (cerebrovascular accident) Z80.78    Severe malnutrition (HCC) E43    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, Urine        Resolved    COVID-19 Rule Out 03/25/21 03/25/21 03/25/21 COVID-19, Rapid (Ordered)   03/25/21 Rule-Out Test Resulted    COVID-19 Rule Out 01/21/21 01/21/21 01/21/21 COVID-19 (Ordered)   01/22/21 Rule-Out Test Resulted          Nurse Assessment:  Last Vital Signs: BP (!) 163/67   Pulse 79   Temp 99.2 °F (37.3 °C) (Temporal)   Resp 16   Ht 5' 5\" (1.651 m)   Wt 105 lb 1.6 oz (47.7 kg)   SpO2 94%   BMI 17.49 kg/m²     Last documented pain score (0-10 scale): Pain Level: 0  Last Weight:   Wt Readings from Last 1 Encounters:   03/23/21 104 lb (47.2 kg)     Mental Status:  disoriented and alert    IV Access:  - None    Nursing Mobility/ADLs:  Walking   Assisted  Transfer  Assisted  Bathing  Dependent  Dressing  Dependent  Toileting  Dependent  Feeding  Dependent  Med Admin  Dependent  Med Delivery   Cut in pudding    Wound Care Documentation and Therapy:        Elimination:  Continence:   · Bowel: No  · Bladder: No  Urinary Catheter: None   Colostomy/Ileostomy/Ileal Conduit: No       Date of Last BM: 4/2    Intake/Output Summary (Last 24 hours) at 4/2/2021 1348  Last data filed at is necessary for the continuing treatment of the diagnosis listed and that she requires [place] for less 30 days.      Update Admission H&P: No change in H&P    PHYSICIAN SIGNATURE:  Electronically signed by STEVE Mckeon CNP on 4/2/21 at 1:18 PM EDT

## 2021-04-02 NOTE — PROGRESS NOTES
with patient. Asked if I could bring supplies in and help get her washed up, but patient refused. Stated not at this time. Will check again with her.

## 2021-04-02 NOTE — PROGRESS NOTES
Patient discharge to the [facility] today the discharge paper work done and fax to SNF. She will go by the Fingerprint.   GIOVANY Parker

## 2021-04-02 NOTE — PROGRESS NOTES
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  *      Patient Active Problem List    Diagnosis Date Noted    PAF (paroxysmal atrial fibrillation) (HCC)     Type 2 MI (myocardial infarction) ([address]     Pneumonia due to organism suspect aspiration 03/26/2021    Hypokalemia 03/06/2021    H/O: CVA (cerebrovascular accident) 03/06/2021    Severe malnutrition ([address] 03/06/2021    Renal cell carcinoma ([facility] [address] 03/05/2021    Acute cystitis without hematuria 03/05/2021    Dehydration 03/05/2021    Anemia due to chemotherapy 03/05/2021    Hyponatremia likely SIADH 02/05/2021    Renal cell carcinoma of left kidney ([address] 02/04/2021    Anemia of chronic renal failure, stage 3 (moderate) 11/25/2020    Microcytic anemia 10/31/2020    Elevated ferritin 10/31/2020    Cerebral vascular disease 07/18/2018    Neuropathy 07/18/2018    Acute ischemic right middle cerebral artery (MCA) stroke (HCC)     Cerebrovascular accident (CVA) due to thrombosis of precerebral artery ([address] 02/06/2018    Hypertensive urgency 02/06/2018    LUE weakness 02/06/2018    Essential hypertension 02/06/2018    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 02/06/2018       PLAN:  Principal Problem:    Pneumonia due to organism suspect aspiration  Active Problems:    Essential hypertension    Anemia of chronic renal failure, stage 3 (moderate)    Hypokalemia    Severe malnutrition (HCC)

## 2021-04-02 NOTE — PROGRESS NOTES
Occupational Therapy  Facility/Department: [facility] MED SURG  Daily Treatment Note  NAME: Anika Dominguez  : 1940  MRN: [id number]    Date of Service: 2021    Discharge Recommendations:  Continue to assess pending progress, Subacute/Skilled Nursing Facility, Home with Home health OT       Assessment      OT Education: OT Role;Plan of Care;Family Education;Equipment;ADL Adaptive Strategies;Transfer Training  Patient Education:  and daughter present, discussed contents of d/c folder extensivly. Pt did not participate  Barriers to Learning: lethargy- listens to all education given  Safety Devices  Safety Devices in place: Yes  Type of devices: Bed alarm in place;Call light within reach; Left in bed;Nurse notified         Patient Diagnosis(es): The primary encounter diagnosis was Pneumonia due to organism. Diagnoses of Acute cystitis without hematuria, Generalized weakness, History of stroke, and Hyponatremia were also pertinent to this visit. has a past medical history of B12 deficiency, CVA (cerebral vascular accident) ([address] CVD (cerebrovascular disease), and Hypertension. has no past surgical history on file. Restrictions  Restrictions/Precautions  Restrictions/Precautions: General Precautions, Fall Risk  Subjective   General  Chart Reviewed: Yes  Patient assessed for rehabilitation services?: Yes  Response to previous treatment: Patient with no complaints from previous session  Family / Caregiver Present: Yes  Referring Practitioner: Adriel Johnston MD  Diagnosis: Pnemonia  Subjective  Subjective: Pt in bed upon [facility] arrival, awaiting d/c to [facility]. Pt denies pain and declines need for ADLs  General Comment  Comments: [employer]   Vital Signs  Patient Currently in Pain: Denies   Orientation     Objective                                                                                     Plan   Plan  Times per week: 7  Times per day: Daily  Current Treatment Recommendations: Safety Education & Training, Patient/Caregiver Education & Training, Self-Care / ADL, Balance Training, Functional Mobility Training, Endurance Training, Strengthening, ROM  G-Code     OutComes Score                                                  AM-PAC Score             Goals  Short term goals  Time Frame for Short term goals: 20 visits  Short term goal 1: Patient will complete transfers with mod A to increase independence with functional mobility. Short term goal 2: Patient will complete bed mobility with min A to return to PLOF. Short term goal 3: Patient will tolerate 5 minute self-care activity sitting EOB with min A to increase independence with self-care.        Therapy Time   Individual Concurrent Group Co-treatment   Time In 1405         Time Out 1420         Minutes 15                 MADELEINE Lopes 9

## 2021-04-02 NOTE — PROGRESS NOTES
Renal Progress Note  [facility]      TELEHEALTH EVALUATION -- Audio/Visual (During ABISF-19 public health emergency)     Telehealth service was provided with the patient at her room in [address] and myself the physician in my office in [place], [place] and my the patient's RN, Klaus Lancaster, who has initiated the visit. Pursuant to the emergency declaration under the [address] [place] [address] [facility] authority and the [employer] Act, this Virtual  Visit was conducted, with patient's consent, to reduce the patient's risk of exposure to COVID-19 and provide continuity of care for an established patient. Services were provided through a video synchronous discussion virtually to substitute for in-person clinic visit. Patient :  Amol Vasquez; 79 y.o. MRN# [id number]  Location:  0320/0320-01  Attending:  Yasir Telles MD  Admit Date:  3/25/2021   Hospital Day: 7      Subjective:     Nephrology is following the patient for hyponatremia. Patient was evaluated via video visit. Still not tolerating lower dose of ureNa packet very well.  at bedside. Patient doesn't offer much. Still poor po intake.     Outpatient Medications:     Medications Prior to Admission: hydrOXYzine (ATARAX) 25 MG tablet, Take 25 mg by mouth nightly  pantoprazole (PROTONIX) 40 MG tablet, TAKE 1 TABLET BY MOUTH EVERY DAY BEFORE BREAKFAST  lisinopril (PRINIVIL;ZESTRIL) 10 MG tablet, Take 1 tablet by mouth 2 times daily  Cholecalciferol (VITAMIN D3) 125 MCG (5000 UT) TABS, Take 5,000 Units by mouth daily  Cyanocobalamin (VITAMIN B 12) 500 MCG TABS, Take 1 tablet by mouth 2 times daily   [DISCONTINUED] linezolid (ZYVOX) 600 MG tablet, Take 1 tablet by mouth 2 times daily for 14 days    Current Medications:     Scheduled Meds:    mirtazapine  7.5 mg Oral Nightly    urea  8 g Oral BID    amLODIPine  2.5 mg Oral Daily    Vitamin D  5,000 Units Oral Daily    metoprolol tartrate  25 mg Oral BID    sodium chloride  2 g Oral TID WC    IVPB builder  500 mg Intravenous Q24H    vitamin B-12  500 mcg Oral BID    hydrOXYzine  25 mg Oral Nightly    pantoprazole  40 mg Oral QAM AC    cefTRIAXone (ROCEPHIN) IV  1,000 mg Intravenous Q24H    sodium chloride flush  10 mL Intravenous 2 times per day    potassium chloride  40 mEq Oral Once    albuterol  2.5 mg Nebulization TID     Continuous Infusions:    0.9% NaCl with KCl 20 mEq 75 mL/hr at 21 0126    sodium chloride       PRN Meds:  albuterol, sodium chloride, sodium chloride flush, promethazine **OR** ondansetron, polyethylene glycol, acetaminophen **OR** acetaminophen    Input/Output:       I/O last 3 completed shifts: In: 2198.2 [P.O.:695; I.V.:1503.2]  Out: 950 [Urine:950]. Patient Vitals for the past 96 hrs (Last 3 readings):   Weight   21 0530 105 lb 1.6 oz (47.7 kg)       Vital Signs:   Temperature:  Temp: 98 °F (36.7 °C)  TMax:   Temp (24hrs), Av.2 °F (36.8 °C), Min:97.4 °F (36.3 °C), Max:99.1 °F (37.3 °C)    Respirations:  Resp: 16  Pulse:   Pulse: 71  BP:    BP: (!) 150/64  BP Range: Systolic (22KQU), TKV:017 , Min:149 , TFP:897       Diastolic (04CMW), KIV:58, Min:59, Max:64      Physical Examination:     General -- awake, frail chronically ill , more alert  Oral Mucosa -- moist  Neck --  JVD - no  Extremities -- no edema,   CNS - awake and alert   Pschy - not agitated, mood and memory normal    Due to this being a TeleHealth encounter, evaluation of the following organ systems is limited: Vitals/EENT/Resp/CV/GI//MS/Neuro/Skin/Heme-Lymph-Imm.   Labs:       Recent Labs     21  0550 21  0530 21  0535   WBC 7.0 7.2 7.1   RBC 2.99* 3.14* 2.98*   HGB 7.3* 7.6* 7.4*   HCT 24.0* 26.0* 24.7*   MCV 80.3* 82.8 82.9   MCH 24.4* 24.2* 24.8*   MCHC 30.4 29.2 30.0   RDW 19.1* 19.1* 19.0*   PLT See Reflexed IPF Result See Reflexed IPF Result See Reflexed IPF Result   MPV NOT REPORTED NOT REPORTED NOT REPORTED      BMP:   Recent Labs     03/31/21  0550 03/31/21  1115 04/01/21  0530 04/02/21  0535   * 131* 131* 132*   K 3.6*  --  3.4* 3.8     --  98 101   CO2 23  --  26 24   BUN 11  --  12 20   CREATININE 0.71  --  0.67 0.58   GLUCOSE 117*  --  116* 105*   CALCIUM 8.1*  --  8.1* 8.1*      Phosphorus:   No results for input(s): PHOS in the last 72 hours. Magnesium:    Recent Labs     04/01/21  0530   MG 1.8     Albumin:  No results for input(s): LABALBU in the last 72 hours. BNP:    No results found for: BNP  VINAY:      Lab Results   Component Value Date    VINAY NEGATIVE 03/09/2018     SPEP:  Lab Results   Component Value Date    PROT 6.1 03/26/2021    PATH ELECTRONICALLY SIGNED.  Shon Bravo M.D. 11/17/2020     UPEP:   No results found for: LABPE  C3:   No results found for: C3  C4:   No results found for: C4  MPO ANCA:   No results found for: MPO  PR3 ANCA:   No results found for: PR3  Anti-GBM:   No results found for: GBMABIGG  Hep BsAg:       No results found for: HEPBSAG  Hep C AB:        No results found for: HEPCAB    Urinalysis/Chemistries:      Lab Results   Component Value Date    NITRU NEGATIVE 03/25/2021    COLORU YELLOW 03/25/2021    PHUR 6.0 03/25/2021    WBCUA 0 TO 2 03/25/2021    RBCUA 0 TO 2 03/25/2021    MUCUS TRACE 03/25/2021    TRICHOMONAS NOT REPORTED 03/25/2021    YEAST NOT REPORTED 03/25/2021    BACTERIA TRACE 03/25/2021    SPECGRAV <1.005 03/25/2021    LEUKOCYTESUR NEGATIVE 03/25/2021    UROBILINOGEN Normal 03/25/2021    BILIRUBINUR NEGATIVE 03/25/2021    GLUCOSEU NEGATIVE 03/25/2021    KETUA NEGATIVE 03/25/2021    AMORPHOUS NOT REPORTED 03/25/2021     Urine Sodium:     Lab Results   Component Value Date    ANYI 145 03/29/2021     Urine Potassium:  No results found for: KUR  Urine Chloride:  No results found for: CLUR  Urine Osmolarity:   Lab Results   Component Value Date    SOHAM 495 03/29/2021     Urine Protein:   No

## 2021-04-02 NOTE — PROGRESS NOTES
Follow up with the  after our conversation from yesterday. Talked more about the difference in hospice care and home health. He has now decided on going to the [facility] for rehab until his daughter comes home. Referral made to the [facility] and paper work fax.     GIOVANY Varma

## 2021-04-02 NOTE — PROGRESS NOTES
RESPIRATORY ASSESSMENT PROTOCOL                                                                                              Patient Name: Kishan Omalley Room#: 1852/8830-38 : 1940     Admitting diagnosis: Pneumonia [J18.9]       Medical History:   Past Medical History:   Diagnosis Date    B12 deficiency     CVA (cerebral vascular accident) ([facility] [address] 2018    left middle cerebral artery stenosis    CVD (cerebrovascular disease)     Hypertension        PATIENT ASSESSMENT    LABORATORY DATA  Hematology:   Lab Results   Component Value Date    WBC 7.1 2021    RBC 2.98 2021    HGB 7.4 2021    HCT 24.7 2021    PLT See Reflexed IPF Result 2021     Chemistry:    Lab Results   Component Value Date    PHART 7.488 2021    IPY5RMN 35.9 2021    PO2ART 190.8 2021    W3QHXZFF 99.4 2021    EZE0LXZ 26.6 2021    PBEA 3.5 2021       VITALS  Pulse: 71   Resp: 16  BP: (!) 150/64  SpO2: 94 % O2 Device: None (Room air)  Temp: 98 °F (36.7 °C)    SKIN COLOR  [x] Normal  [] Pale  [] Dusky  [] Cyanotic    RESPIRATORY PATTERN  [x] Normal  [] Dyspnea  [] Cheyne-Shah  [] Kussmaul  [] Biots    AMBULATORY  [] Yes  [x] No  [] With Assistance    PEAK FLOW  Predicted:     Personal Best:        VITAL CAPACITY  Predicted value:  ml  Actual Value:  ml  30% of Predicted:  ml  Patient Acuity 0 1 2 3 4 Score   Level of Concious (LOC) [x]  Alert & Oriented or Pt normal LOC []  Confused;follows directions []  Confused & uncooper-ative []  Obtunded []  Comatose 0   Respiratory Rate  (RR) [x]  Reg. rate & pattern. 12 - 20 bpm  []  Increased RR.  Greater than 20 bpm   []  SOB w/ exertion or RR greater than 24 bpm []  Access- ory muscle use at rest. Abn.  resp. []  SOB at rest.   0   Bilateral Breath Sounds (BBS) [x]  Clear []  Diminish-ed bases  []  Diminish-ed t/o, or rales   []  Sporadic, scattered wheezes or rhonchi []  Persistentwheezes and, or absent BBS 0   Cough [x]  Strong, effective, & non-prod. []  Effective & prod. Less than 25 ml (2 TBSP) over past 24 hrs []  Ineffective & non-prod to less than 25 ML over past 24 hrs []  Ineffective and, or greater than 25 ml sputum prod. past 24 hrs. []  Nonspon- taneous; Requires suctioning 0   Pulmonary History  (PULM HX) []  No smoking and no chronic pulmonaryhistory []  Former smoker. Quit over 12 mos. ago []  Current smoker or quit w/ in 12 mos []  Pulm. History and, or 20 pk/yr smoking hx [x]  Admitted w/ acute pulm. dx and, or has been admitted w/ pulm. dx 2 or more times over past 12 mos 4   Surgical History this Admit  (SURG HX) [x]  No surgery []  General surgery []  Lower abdominal []  Thoracic or upper abdominal   []  Thoracic w/ pulm. disease 0   Chest X-Ray (CXR)/CT Scan []  Clear or not applicable []  Not available [x]  Atelect- asis or pleural effusions []  Localized infiltrate or pulm. edema []  Con-solidated Infiltrates, bilateral, or in more than 1 lobe 2   Slow or Forced VC, FEV1 OR PEFR (PULM FXN)  [x]  80% or greater, or not indicated []  Pt. unable to perform []  FEV1 or PEFR or VC 51-79%. []  FEV1 or PEFR or VC  30-49%   []  FEV1 or PEFR or VC less than 30%   0   TOTAL ACUITY: 6       CARE PLAN    If Acuity Level is 2, 3, or 4 in any of the following:    [] BILATERAL BREATH SOUNDS (BBS)     [x] PULMONARY HISTORY (PULM HX)  [] PULMONARY FUNCTION (PULM FX)    Goal: Improve respiratory functions in patients with airway disease and decrease WOB    [x] AEROSOL PROTOCOL    Total Acuity:   16-32  []  Secondary Assessment in 24 hrs Total Acuity:  9-15  []  Secondary Assessment in 24 hrs Total Acuity:  4-8  [x]  Secondary Assessment in 48 hrs Total Acuity:  0-3  []  Secondary Assessment in 72 hrs   HHN AEROSOL THERAPY with  [physician-ordered bronchodilator(s)] q 4 & Albuterol PRN q2 hrs. Breath-Actuated Neb if BBS Acuity = 4, and pt. can use MP. Notify physician if condition deteriorates.   [facility]N AEROSOL THERAPY with

## 2021-04-02 NOTE — PROGRESS NOTES
At bedside doing 2nd assessment with patient, and patient spoke up that she wanted her blankets off. She then asked for a sip of water. Patient held the water bottle and took several sips of water from straw with no difficulty. Patient said Zahira Mckeon sure like to keep me bundled up\". Writer asked if she would like to keep the blankets off her bed. Patient said \"just keep the sheet and one blanket, not the black one, its too heavy. \" Patient asked writer to pass that along to her  in the morning, that she doesn't want to be so bundled up because she gets too warm. Patient was repositioned, reported she was comfortable and has no further needs at this time.

## 2021-04-04 PROBLEM — E86.0 DEHYDRATION: Status: RESOLVED | Noted: 2021-03-05 | Resolved: 2021-04-04

## 2021-04-07 ENCOUNTER — OFFICE VISIT (OUTPATIENT)
Dept: ONCOLOGY | Age: 81
End: 2021-04-07
Payer: MEDICARE

## 2021-04-07 VITALS
TEMPERATURE: 98.2 F | HEART RATE: 71 BPM | RESPIRATION RATE: 18 BRPM | DIASTOLIC BLOOD PRESSURE: 55 MMHG | SYSTOLIC BLOOD PRESSURE: 136 MMHG

## 2021-04-07 DIAGNOSIS — C64.2 RENAL CELL CARCINOMA OF LEFT KIDNEY (HCC): Primary | ICD-10-CM

## 2021-04-07 DIAGNOSIS — D63.1 ANEMIA OF CHRONIC RENAL FAILURE, STAGE 3A (HCC): ICD-10-CM

## 2021-04-07 DIAGNOSIS — N18.31 ANEMIA OF CHRONIC RENAL FAILURE, STAGE 3A (HCC): ICD-10-CM

## 2021-04-07 PROCEDURE — G8420 CALC BMI NORM PARAMETERS: HCPCS | Performed by: INTERNAL MEDICINE

## 2021-04-07 PROCEDURE — 1036F TOBACCO NON-USER: CPT | Performed by: INTERNAL MEDICINE

## 2021-04-07 PROCEDURE — G8427 DOCREV CUR MEDS BY ELIG CLIN: HCPCS | Performed by: INTERNAL MEDICINE

## 2021-04-07 PROCEDURE — 4040F PNEUMOC VAC/ADMIN/RCVD: CPT | Performed by: INTERNAL MEDICINE

## 2021-04-07 PROCEDURE — 1090F PRES/ABSN URINE INCON ASSESS: CPT | Performed by: INTERNAL MEDICINE

## 2021-04-07 PROCEDURE — 1111F DSCHRG MED/CURRENT MED MERGE: CPT | Performed by: INTERNAL MEDICINE

## 2021-04-07 PROCEDURE — 99214 OFFICE O/P EST MOD 30 MIN: CPT | Performed by: INTERNAL MEDICINE

## 2021-04-07 PROCEDURE — G8400 PT W/DXA NO RESULTS DOC: HCPCS | Performed by: INTERNAL MEDICINE

## 2021-04-07 PROCEDURE — 1123F ACP DISCUSS/DSCN MKR DOCD: CPT | Performed by: INTERNAL MEDICINE

## 2021-04-07 PROCEDURE — 99211 OFF/OP EST MAY X REQ PHY/QHP: CPT | Performed by: INTERNAL MEDICINE

## 2021-04-07 RX ORDER — ACETAMINOPHEN 325 MG/1
650 TABLET ORAL EVERY 4 HOURS PRN
COMMUNITY

## 2021-04-07 NOTE — PROGRESS NOTES
_           Chief Complaint   Patient presents with    Follow-up     renal cell carcinoma of left kidney currently at the [facility]     DIAGNOSIS:       1. Chronic microcytic anemia due to renal insufficiency and nutritional issues. As well as anemia of malignancy  2. History of CVA with left-sided weakness. Resolving. 3. Further work-up showed left kidney mass, likely renal cell carcinoma  4. Multiple lung lesions not amenable to biopsy and too small. But suspicious for metastatic disease  CURRENT THERAPY:         1. For anemia, started on Aranesp  2. Sent for evaluation for surgical intervention for the renal mass but she was not thought to be a surgical candidate  3. systemic therapy likely with Keytruda   4. Received 2 cycles but performance status continues to deteriorate. Considering hospice and palliative care    BRIEF CASE HISTORY:      Ms. Verona Campo is a very pleasant 79 y.o. female with history of multiple co morbidities as listed. The patient is referred for iron deficiency anemia. Patient was doing very well over very long duration with no underlying medical problems. She had stroke in February 2018 with left-sided weakness. She has significant improvement in her power and function since then. She was maintained on aspirin and vitamins since that time. The patient's last evaluation with her PCP showed severe anemia. Further work-up showed very low iron level with elevated ferritin. Patient does not have any major symptoms. She has increasing weakness and fatigue since January of this year. She has no headaches or dizziness. No palpitation. No shortness of breath. Patient denies any active bleeding. No melena or hematochezia. No hematemesis. No vaginal bleeding. No hematuria. She had stool guaiac test which was negative.   She was started on oral iron on October 16 and it is tolerated fairly well. Mild constipation. No other side effects. Patient denies smoking or alcohol drinking. . The patient was seen and started on Aranesp. Iron studies were adequate with elevated ferritin. Looking at her symptoms, I was concerned about her weight loss and progressive fatigue. Out of proportion to her anemia. CT scan was done and showed unfortunately the large left kidney mass highly suggestive of renal cell carcinoma. In addition multiple pulmonary nodules were seen all in the subcentimeter except 1 lesion in right upper lobe measuring 1.4 cm. That was felt not to be amenable for biopsy. patient went to [facility] for consideration of nephrectomy, they felt she is not best candidate for surgery and recommended systemic therapy  Discussed option and we decided to start the patient on Keytruda. We are considering adding Inlyta depending on tolerance and response. The patient has a previous stroke and that is why I am concerned for VEGF inhibitors  The patient received 2 cycles of Keytruda but then she was hospitalized. Her condition continues to deteriorate. Most of her problems are related to fatigue and previous strokes. INTERIM HISTORY:   Patient seen for follow-up , accompanied by her family     She was hospitalized recently because of weakness and fatigue and she had UTI. She was also found to have aspiration pneumonia. She was treated with antibiotics. She was discharged to rehab. Condition did not improve. Patient and family are very frustrated because of ongoing fatigue, she sleeps most of the days. She has very poor appetite. I would estimate her performance status by ECOG 3   PAST MEDICAL HISTORY: has a past medical history of B12 deficiency, CVA (cerebral vascular accident) ([facility] [address] CVD (cerebrovascular disease), and Hypertension. PAST SURGICAL HISTORY: has no past surgical history on file.      CURRENT MEDICATIONS:  has a current medication list which includes the following prescription(s): acetaminophen, metoprolol tartrate, amlodipine, urea, cephalexin, hydroxyzine, pantoprazole, vitamin d3, and vitamin b 12. ALLERGIES:  is allergic to mirtazapine and zoloft [sertraline]. FAMILY HISTORY: Negative for any hematological or oncological conditions. SOCIAL HISTORY:  reports that she has never smoked. She has never used smokeless tobacco. She reports that she does not drink alcohol or use drugs. REVIEW OF SYSTEMS:     · General: Positive for weakness and fatigue. Weight is stable, back pain is controlled, no fever  · Eyes: No blurred vision, eye pain or double vision. · Ears: No hearing problems or drainage. No tinnitus. · Throat: No sore throat, problems with swallowing or dysphagia. · Respiratory:+  cough, no sputum or hemoptysis. Fatigue and chest discomfort, shortness of breath  · Cardiovascular: No chest pain, orthopnea or PND. No lower extremity edema. No palpitation. · Gastrointestinal: Poor appetite, nausea, epigastric pain. No diarrhea or bleeding  · Genitourinary: No dysuria, hematuria, frequency or urgency. · Musculoskeletal: No muscle aches or pains. No limitation of movement. No back pain. No gait disturbance, No joint complaints. · Dermatologic: No skin rashes or pruritus. No skin lesions or discolorations. · Psychiatric: No depression, anxiety, or stress or signs of schizophrenia. No change in mood or affect. · Hematologic: No history of bleeding tendency. No bruises or ecchymosis. No history of clotting problems. · Infectious disease: No fever, chills or frequent infections. · Endocrine: No polydipsia or polyuria. No temperature intolerance. · Neurologic: History of stroke with left-sided weakness , generalized fatigue  · Allergic/Immunologic: No nasal congestion or hives. No repeated infections. PHYSICAL EXAM:  The patient is not in acute distress.   Vital signs: Blood pressure (!) 136/55, pulse 71, temperature 98.2 °F (36.8 °C), temperature source Temporal, resp. rate 18, not currently breastfeeding.      General appearance -ill appearing cachectic lady, lethargic and sleepy  Eyes - pupils equal and reactive, extraocular eye movements intact  Ears - bilateral TM's and external ear canals normal  Nose - normal and patent, no erythema, discharge or polyps  Mouth - mucous membranes moist, pharynx normal without lesions  Neck - supple, no significant adenopathy  Lymphatics - no palpable lymphadenopathy, no hepatosplenomegaly  Chest - clear to auscultation, no wheezes, rales or rhonchi, symmetric air entry  Heart - normal rate, regular rhythm, normal S1, S2, no murmurs, rubs, clicks or gallops  Abdomen - soft, nontender, nondistended, no masses or organomegaly  Neurological -lethargic and sleepy, generalized fatigue, no new deficit, continues to have left-sided weakness  Musculoskeletal - no joint tenderness, deformity or swelling  Extremities - peripheral pulses normal, no pedal edema, no clubbing or cyanosis  Skin -skin rash on her face    Review of Diagnostic data:   Lab Results   Component Value Date    WBC 7.1 04/02/2021    HGB 7.4 (L) 04/02/2021    HCT 24.7 (L) 04/02/2021    MCV 82.9 04/02/2021    PLT See Reflexed IPF Result 04/02/2021       Chemistry        Component Value Date/Time     (L) 04/02/2021 0535    K 3.8 04/02/2021 0535     04/02/2021 0535    CO2 24 04/02/2021 0535    BUN 20 04/02/2021 0535    CREATININE 0.58 04/02/2021 0535        Component Value Date/Time    CALCIUM 8.1 (L) 04/02/2021 0535    ALKPHOS 106 (H) 03/26/2021 0550    AST 13 03/26/2021 0550    ALT 8 03/26/2021 0550    BILITOT 0.64 03/26/2021 0550        Lab Results   Component Value Date    IRON 18 (L) 11/17/2020    TIBC 155 (L) 11/17/2020    FERRITIN 1,139 (H) 11/17/2020     CT scan  Impression   Chest:       1. Multiple pulmonary nodules with the largest measuring 1.4 x 0.9 cm in the   right upper lobe.  Additional pulmonary nodules measuring 3-4 mm.  Findings   concerning for pulmonary metastatic disease given the findings in the   abdomen.  Mild emphysema.  Old granulomatous disease. 2. Respiratory motion and mild dependent atelectasis in both lungs. 3. Coronary artery disease.  Atheromatous plaque and atherosclerotic   calcification of the aorta. Abdomen/pelvis:       1. Findings concerning for a large centrally necrotic renal mass likely renal   cell carcinoma involving the inferior half of the left kidney with mass   effect upon the adjacent anatomy including the small bowel loops.  This   measures 11.5 x 9.5 x 11.1 cm in greatest dimensions.  Collateralization of   vessels in the left-sided perinephric space. 2. Appearance of the urinary bladder can be seen with cystitis.  Please   correlate with urinalysis. 3. Fatty liver. 4. Atherosclerotic calcification of the abdominal aorta and branch   vasculature. 5. Probable adrenal hyperplasia. 6. Old granulomatous disease. 7. 7 mm upper pole left renal cyst.       IMPRESSION:   1. Microcytic anemia multifactorial, likely anemia of malignancy and anemia of renal disease as well as anemia of chronic illness   2. History of CVA with left-sided weakness. Resolving. 3. Left-sided renal cell carcinoma  4. Multiple lung lesions concerning for metastatic disease    PLAN:   The patient received 2 cycles of Keytruda. Clinically she is deteriorating and I am very worried about her future tolerance to therapy. We talked about options, the patient and family seem to be open for palliative care. We decided after discussion to proceed with CT scan, and unless she has a great response to therapy, the decision will be to stop all therapy and go to palliative care and hospice. Patient performance status were normal allowing to give anything higher than what she has received so far.   Because of her stroke, it will be difficult to offer her Vegf inhibitors  We will

## 2021-04-08 ENCOUNTER — TELEPHONE (OUTPATIENT)
Dept: ONCOLOGY | Age: 81
End: 2021-04-08

## 2021-04-08 ENCOUNTER — HOSPITAL ENCOUNTER (OUTPATIENT)
Dept: CT IMAGING | Age: 81
Discharge: HOME | End: 2021-04-10
Payer: MEDICARE

## 2021-04-08 DIAGNOSIS — C64.2 RENAL CELL CARCINOMA OF LEFT KIDNEY (HCC): ICD-10-CM

## 2021-04-08 PROCEDURE — 74177 CT ABD & PELVIS W/CONTRAST: CPT

## 2021-04-08 PROCEDURE — 6360000004 HC RX CONTRAST MEDICATION: Performed by: INTERNAL MEDICINE

## 2021-04-08 RX ADMIN — IOPAMIDOL 75 ML: 755 INJECTION, SOLUTION INTRAVENOUS at 13:54

## 2021-04-08 NOTE — TELEPHONE ENCOUNTER
Patient's  called for results of CT scan.   Scans reports reviewed by Dr Daryl Nathan and due to current PS and advancing disease would recommend hospice referral.  Patient is currently at the [facility] and  will work with them or contact me to help with hospice referral.